# Patient Record
Sex: MALE | Race: WHITE | NOT HISPANIC OR LATINO | Employment: OTHER | URBAN - METROPOLITAN AREA
[De-identification: names, ages, dates, MRNs, and addresses within clinical notes are randomized per-mention and may not be internally consistent; named-entity substitution may affect disease eponyms.]

---

## 2017-09-07 ENCOUNTER — HOSPITAL ENCOUNTER (OUTPATIENT)
Dept: RADIOLOGY | Facility: MEDICAL CENTER | Age: 82
End: 2017-09-07

## 2017-09-07 ENCOUNTER — HOSPITAL ENCOUNTER (INPATIENT)
Facility: MEDICAL CENTER | Age: 82
LOS: 7 days | DRG: 853 | End: 2017-09-14
Attending: EMERGENCY MEDICINE | Admitting: HOSPITALIST
Payer: MEDICARE

## 2017-09-07 ENCOUNTER — RESOLUTE PROFESSIONAL BILLING HOSPITAL PROF FEE (OUTPATIENT)
Dept: HOSPITALIST | Facility: MEDICAL CENTER | Age: 82
End: 2017-09-07
Payer: MEDICARE

## 2017-09-07 DIAGNOSIS — Z79.01 CHRONIC ANTICOAGULATION: ICD-10-CM

## 2017-09-07 DIAGNOSIS — A41.9 SEPSIS, DUE TO UNSPECIFIED ORGANISM: ICD-10-CM

## 2017-09-07 DIAGNOSIS — K83.1 BILIARY OBSTRUCTION: ICD-10-CM

## 2017-09-07 DIAGNOSIS — R55 SYNCOPE, UNSPECIFIED SYNCOPE TYPE: ICD-10-CM

## 2017-09-07 PROBLEM — K80.50 CHOLEDOCHOLITHIASIS: Status: ACTIVE | Noted: 2017-09-07

## 2017-09-07 PROBLEM — K27.9 PUD (PEPTIC ULCER DISEASE): Status: ACTIVE | Noted: 2017-09-07

## 2017-09-07 PROBLEM — E87.20 METABOLIC ACIDEMIA: Status: ACTIVE | Noted: 2017-09-07

## 2017-09-07 PROBLEM — D68.32 HEMORRHAGIC DISORDER DUE TO EXTRINSIC CIRCULATING ANTICOAGULANTS (HCC): Status: ACTIVE | Noted: 2017-09-07

## 2017-09-07 PROBLEM — K83.09 CHOLANGITIS: Status: ACTIVE | Noted: 2017-09-07

## 2017-09-07 LAB
ALBUMIN SERPL BCP-MCNC: 3.8 G/DL (ref 3.2–4.9)
ALBUMIN/GLOB SERPL: 1.2 G/DL
ALP SERPL-CCNC: 199 U/L (ref 30–99)
ALT SERPL-CCNC: 497 U/L (ref 2–50)
ANION GAP SERPL CALC-SCNC: 11 MMOL/L (ref 0–11.9)
APPEARANCE UR: CLEAR
AST SERPL-CCNC: 946 U/L (ref 12–45)
BACTERIA #/AREA URNS HPF: NEGATIVE /HPF
BASOPHILS # BLD AUTO: 0.3 % (ref 0–1.8)
BASOPHILS # BLD: 0.06 K/UL (ref 0–0.12)
BILIRUB SERPL-MCNC: 3 MG/DL (ref 0.1–1.5)
BILIRUB UR QL STRIP.AUTO: NEGATIVE
BNP SERPL-MCNC: 203 PG/ML (ref 0–100)
BUN SERPL-MCNC: 31 MG/DL (ref 8–22)
CALCIUM SERPL-MCNC: 8.6 MG/DL (ref 8.5–10.5)
CFT BLD TEG: 3.4 MIN (ref 5–10)
CHLORIDE SERPL-SCNC: 107 MMOL/L (ref 96–112)
CLOT ANGLE BLD TEG: 64.4 DEGREES (ref 53–72)
CLOT LYSIS 30M P MA LENFR BLD TEG: 0.4 % (ref 0–8)
CO2 SERPL-SCNC: 18 MMOL/L (ref 20–33)
COLOR UR: YELLOW
CREAT SERPL-MCNC: 1.44 MG/DL (ref 0.5–1.4)
CT.EXTRINSIC BLD ROTEM: 0.9 MIN (ref 1–3)
CULTURE IF INDICATED INDCX: NO UA CULTURE
EOSINOPHIL # BLD AUTO: 0 K/UL (ref 0–0.51)
EOSINOPHIL NFR BLD: 0 % (ref 0–6.9)
EPI CELLS #/AREA URNS HPF: NEGATIVE /HPF
ERYTHROCYTE [DISTWIDTH] IN BLOOD BY AUTOMATED COUNT: 49 FL (ref 35.9–50)
GFR SERPL CREATININE-BSD FRML MDRD: 47 ML/MIN/1.73 M 2
GLOBULIN SER CALC-MCNC: 3.3 G/DL (ref 1.9–3.5)
GLUCOSE BLD-MCNC: 285 MG/DL (ref 65–99)
GLUCOSE SERPL-MCNC: 328 MG/DL (ref 65–99)
GLUCOSE UR STRIP.AUTO-MCNC: >=1000 MG/DL
HCT VFR BLD AUTO: 34 % (ref 42–52)
HGB BLD-MCNC: 11.1 G/DL (ref 14–18)
HYALINE CASTS #/AREA URNS LPF: ABNORMAL /LPF
IMM GRANULOCYTES # BLD AUTO: 0.07 K/UL (ref 0–0.11)
IMM GRANULOCYTES NFR BLD AUTO: 0.4 % (ref 0–0.9)
KETONES UR STRIP.AUTO-MCNC: 15 MG/DL
LACTATE BLD-SCNC: 1.9 MMOL/L (ref 0.5–2)
LACTATE BLD-SCNC: 2.9 MMOL/L (ref 0.5–2)
LEUKOCYTE ESTERASE UR QL STRIP.AUTO: NEGATIVE
LYMPHOCYTES # BLD AUTO: 0.53 K/UL (ref 1–4.8)
LYMPHOCYTES NFR BLD: 2.8 % (ref 22–41)
MCF BLD TEG: 70.4 MM (ref 50–70)
MCH RBC QN AUTO: 32 PG (ref 27–33)
MCHC RBC AUTO-ENTMCNC: 32.6 G/DL (ref 33.7–35.3)
MCV RBC AUTO: 98 FL (ref 81.4–97.8)
MICRO URNS: ABNORMAL
MONOCYTES # BLD AUTO: 1.2 K/UL (ref 0–0.85)
MONOCYTES NFR BLD AUTO: 6.3 % (ref 0–13.4)
NEUTROPHILS # BLD AUTO: 17.24 K/UL (ref 1.82–7.42)
NEUTROPHILS NFR BLD: 90.2 % (ref 44–72)
NITRITE UR QL STRIP.AUTO: NEGATIVE
NRBC # BLD AUTO: 0 K/UL
NRBC BLD AUTO-RTO: 0 /100 WBC
PA AA BLD-ACNC: 13.3 %
PA ADP BLD-ACNC: 8.1 %
PH UR STRIP.AUTO: 5.5 [PH]
PLATELET # BLD AUTO: 155 K/UL (ref 164–446)
PMV BLD AUTO: 13.3 FL (ref 9–12.9)
POTASSIUM SERPL-SCNC: 4.2 MMOL/L (ref 3.6–5.5)
PROT SERPL-MCNC: 7.1 G/DL (ref 6–8.2)
PROT UR QL STRIP: 30 MG/DL
RBC # BLD AUTO: 3.47 M/UL (ref 4.7–6.1)
RBC # URNS HPF: ABNORMAL /HPF
RBC UR QL AUTO: NEGATIVE
SODIUM SERPL-SCNC: 136 MMOL/L (ref 135–145)
SP GR UR STRIP.AUTO: 1.02
TEG ALGORITHM TGALG: ABNORMAL
TROPONIN I SERPL-MCNC: <0.01 NG/ML (ref 0–0.04)
UROBILINOGEN UR STRIP.AUTO-MCNC: 1 MG/DL
WBC # BLD AUTO: 19.1 K/UL (ref 4.8–10.8)
WBC #/AREA URNS HPF: ABNORMAL /HPF

## 2017-09-07 PROCEDURE — 94760 N-INVAS EAR/PLS OXIMETRY 1: CPT

## 2017-09-07 PROCEDURE — 85384 FIBRINOGEN ACTIVITY: CPT

## 2017-09-07 PROCEDURE — 93005 ELECTROCARDIOGRAM TRACING: CPT | Performed by: EMERGENCY MEDICINE

## 2017-09-07 PROCEDURE — 99221 1ST HOSP IP/OBS SF/LOW 40: CPT | Mod: AI | Performed by: HOSPITALIST

## 2017-09-07 PROCEDURE — 85025 COMPLETE CBC W/AUTO DIFF WBC: CPT | Mod: 91

## 2017-09-07 PROCEDURE — 96361 HYDRATE IV INFUSION ADD-ON: CPT

## 2017-09-07 PROCEDURE — 36415 COLL VENOUS BLD VENIPUNCTURE: CPT

## 2017-09-07 PROCEDURE — 85347 COAGULATION TIME ACTIVATED: CPT

## 2017-09-07 PROCEDURE — 96365 THER/PROPH/DIAG IV INF INIT: CPT

## 2017-09-07 PROCEDURE — 700105 HCHG RX REV CODE 258: Performed by: EMERGENCY MEDICINE

## 2017-09-07 PROCEDURE — 700111 HCHG RX REV CODE 636 W/ 250 OVERRIDE (IP): Performed by: EMERGENCY MEDICINE

## 2017-09-07 PROCEDURE — 770022 HCHG ROOM/CARE - ICU (200)

## 2017-09-07 PROCEDURE — 700105 HCHG RX REV CODE 258: Performed by: HOSPITALIST

## 2017-09-07 PROCEDURE — 82962 GLUCOSE BLOOD TEST: CPT

## 2017-09-07 PROCEDURE — 83605 ASSAY OF LACTIC ACID: CPT | Mod: 91

## 2017-09-07 PROCEDURE — 700102 HCHG RX REV CODE 250 W/ 637 OVERRIDE(OP): Performed by: HOSPITALIST

## 2017-09-07 PROCEDURE — 83880 ASSAY OF NATRIURETIC PEPTIDE: CPT

## 2017-09-07 PROCEDURE — 81001 URINALYSIS AUTO W/SCOPE: CPT

## 2017-09-07 PROCEDURE — 96372 THER/PROPH/DIAG INJ SC/IM: CPT

## 2017-09-07 PROCEDURE — 99291 CRITICAL CARE FIRST HOUR: CPT

## 2017-09-07 PROCEDURE — 80053 COMPREHEN METABOLIC PANEL: CPT | Mod: 91

## 2017-09-07 PROCEDURE — 84484 ASSAY OF TROPONIN QUANT: CPT | Mod: 91

## 2017-09-07 PROCEDURE — 85576 BLOOD PLATELET AGGREGATION: CPT | Mod: 91

## 2017-09-07 RX ORDER — SODIUM CHLORIDE 9 MG/ML
INJECTION, SOLUTION INTRAVENOUS CONTINUOUS
Status: DISCONTINUED | OUTPATIENT
Start: 2017-09-07 | End: 2017-09-14 | Stop reason: HOSPADM

## 2017-09-07 RX ORDER — BISACODYL 10 MG
10 SUPPOSITORY, RECTAL RECTAL
Status: DISCONTINUED | OUTPATIENT
Start: 2017-09-07 | End: 2017-09-14 | Stop reason: HOSPADM

## 2017-09-07 RX ORDER — SODIUM CHLORIDE 9 MG/ML
1000 INJECTION, SOLUTION INTRAVENOUS ONCE
Status: COMPLETED | OUTPATIENT
Start: 2017-09-07 | End: 2017-09-07

## 2017-09-07 RX ORDER — AMOXICILLIN 250 MG
2 CAPSULE ORAL 2 TIMES DAILY
Status: DISCONTINUED | OUTPATIENT
Start: 2017-09-07 | End: 2017-09-14 | Stop reason: HOSPADM

## 2017-09-07 RX ORDER — LEVOTHYROXINE SODIUM 0.12 MG/1
125 TABLET ORAL
Status: DISCONTINUED | OUTPATIENT
Start: 2017-09-08 | End: 2017-09-14 | Stop reason: HOSPADM

## 2017-09-07 RX ORDER — POLYETHYLENE GLYCOL 3350 17 G/17G
1 POWDER, FOR SOLUTION ORAL
Status: DISCONTINUED | OUTPATIENT
Start: 2017-09-07 | End: 2017-09-14 | Stop reason: HOSPADM

## 2017-09-07 RX ORDER — SODIUM CHLORIDE 9 MG/ML
500 INJECTION, SOLUTION INTRAVENOUS
Status: COMPLETED | OUTPATIENT
Start: 2017-09-07 | End: 2017-09-08

## 2017-09-07 RX ORDER — ACETAMINOPHEN 325 MG/1
650 TABLET ORAL EVERY 6 HOURS PRN
Status: DISCONTINUED | OUTPATIENT
Start: 2017-09-07 | End: 2017-09-13

## 2017-09-07 RX ORDER — DEXTROSE MONOHYDRATE 25 G/50ML
25 INJECTION, SOLUTION INTRAVENOUS
Status: DISCONTINUED | OUTPATIENT
Start: 2017-09-07 | End: 2017-09-14 | Stop reason: HOSPADM

## 2017-09-07 RX ORDER — ONDANSETRON 4 MG/1
4 TABLET, ORALLY DISINTEGRATING ORAL EVERY 4 HOURS PRN
Status: DISCONTINUED | OUTPATIENT
Start: 2017-09-07 | End: 2017-09-14 | Stop reason: HOSPADM

## 2017-09-07 RX ORDER — ONDANSETRON 2 MG/ML
4 INJECTION INTRAMUSCULAR; INTRAVENOUS EVERY 4 HOURS PRN
Status: DISCONTINUED | OUTPATIENT
Start: 2017-09-07 | End: 2017-09-14 | Stop reason: HOSPADM

## 2017-09-07 RX ADMIN — SODIUM CHLORIDE: 9 INJECTION, SOLUTION INTRAVENOUS at 20:16

## 2017-09-07 RX ADMIN — INSULIN LISPRO 7 UNITS: 100 INJECTION, SOLUTION INTRAVENOUS; SUBCUTANEOUS at 20:20

## 2017-09-07 RX ADMIN — SODIUM CHLORIDE: 9 INJECTION, SOLUTION INTRAVENOUS at 22:13

## 2017-09-07 RX ADMIN — PIPERACILLIN SODIUM AND TAZOBACTAM SODIUM 4.5 G: 4; .5 INJECTION, POWDER, FOR SOLUTION INTRAVENOUS at 17:35

## 2017-09-07 RX ADMIN — SODIUM CHLORIDE 1000 ML: 9 INJECTION, SOLUTION INTRAVENOUS at 17:14

## 2017-09-07 ASSESSMENT — ENCOUNTER SYMPTOMS
FALLS: 1
TINGLING: 0
DIZZINESS: 0
LOSS OF CONSCIOUSNESS: 1
BLOOD IN STOOL: 0
INSOMNIA: 0
ABDOMINAL PAIN: 0
PALPITATIONS: 0
DEPRESSION: 0
HEADACHES: 0
BACK PAIN: 0
MEMORY LOSS: 1
NAUSEA: 0
FEVER: 0
DIARRHEA: 0
EYE PAIN: 1
EYE PAIN: 0
BLURRED VISION: 0
ABDOMINAL PAIN: 1
SORE THROAT: 0
CHILLS: 0
SHORTNESS OF BREATH: 0
COUGH: 0
NECK PAIN: 0
VOMITING: 0
WEAKNESS: 0

## 2017-09-07 ASSESSMENT — LIFESTYLE VARIABLES: EVER_SMOKED: NEVER

## 2017-09-07 ASSESSMENT — COPD QUESTIONNAIRES
COPD SCREENING SCORE: 2
DO YOU EVER COUGH UP ANY MUCUS OR PHLEGM?: NO/ONLY WITH OCCASIONAL COLDS OR INFECTIONS
DURING THE PAST 4 WEEKS HOW MUCH DID YOU FEEL SHORT OF BREATH: NONE/LITTLE OF THE TIME
HAVE YOU SMOKED AT LEAST 100 CIGARETTES IN YOUR ENTIRE LIFE: NO/DON'T KNOW

## 2017-09-07 ASSESSMENT — PAIN SCALES - GENERAL: PAINLEVEL_OUTOF10: 0

## 2017-09-07 NOTE — ED NOTES
Pt this AM woke up with some nausea and LLQ pain. Got up to use the bathroom and had a unwitnessed syncopal event. Slight pain to head and left knee. AOx4. GCS 15. GRADY. Sensation intact. Pt had scans done pta. 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 Transferred for further care.   /60   Pulse 72   Temp 37 °C (98.6 °F)   Resp 16   Ht 1.829 m (6')   Wt 70.8 kg (156 lb)   SpO2 91%   BMI 21.16 kg/m²   In gown, on monitor, EKG completed. Chart up for ERP.

## 2017-09-07 NOTE — ED PROVIDER NOTES
ED Provider Note    Scribed for Ruiz Salazar M.D. by Robinson Nichols. 9/7/2017, 4:35 PM.    Primary care provider: Myron Haines M.D.  Means of arrival: [facility]  History obtained from: Patient  History limited by: None    CHIEF COMPLAINT  Chief Complaint   Patient presents with   • Syncope     In bathroom this AM had syncopal event unwitnessed. Slid down wall.    • LLQ Pain     started this AM. Got up to go to the bathroom prior to sycopal event     HPI  Naveed Tamayo is a 83 y.o. Male with a history of cancer, diabetes, and peptic ulcer disease who presents to the Emergency Department complaining of acute onset syncope, onset this morning while at home. This was an unwitnessed event and the patient does not recall the event. He remembers walking into the bathroom and waking up in the bathroom back against the wall. The patient notes that he is on blood thinners at this time for DVT 2 months ago. Patient denies associated head trauma, neck pain, chest pain, back pain, nausea, vomiting, fever, chills, weakness, decrease in PO intake, hematochezia, or melena. He endorses a slight amount of left lower quadrant abdominal pain, right knee pain, and right arm pain at this time.     REVIEW OF SYSTEMS  Review of Systems   Constitutional: Negative for chills, fever and malaise/fatigue.   Eyes: Positive for pain.   Cardiovascular: Negative for chest pain.   Gastrointestinal: Positive for abdominal pain. Negative for blood in stool, diarrhea, melena, nausea and vomiting.        No decrease in PO intake.   Musculoskeletal: Positive for falls. Negative for back pain and neck pain.        Positive leg pain and arm pain   Neurological: Positive for loss of consciousness. Negative for weakness and headaches.        Positive syncope   Psychiatric/Behavioral: Positive for memory loss.   All other systems reviewed and are negative.  C.    PAST MEDICAL HISTORY   has a past medical history of Cancer (CMS-HCC); Diabetes;  Heart murmur; and History of peptic ulcer (1960).    SURGICAL HISTORY   has a past surgical history that includes thyroidectomy; prostatectomy, radical retro; abdominal exploration; and cholecystectomy.    SOCIAL HISTORY  Social History   Substance Use Topics   • Smoking status: Never Smoker   • Smokeless tobacco: Never Used   • Alcohol use No      History   Drug Use No     FAMILY HISTORY  No pertinent family history    CURRENT MEDICATIONS  No current facility-administered medications on file prior to encounter.      Current Outpatient Prescriptions on File Prior to Encounter   Medication Sig Dispense Refill   • Rivaroxaban (XARELTO PO) Take  by mouth.     • cyclobenzaprine (FLEXERIL) 10 MG Tab Take 1 Tab by mouth 3 times a day as needed for Muscle Spasms. 45 Tab 0   • Cholecalciferol (VITAMIN D PO) Take  by mouth every day.     • insulin detemir (LEVEMIR FLEXPEN) 100 UNIT/ML SOPN injection Inject  as instructed every evening.     • levothyroxine (SYNTHROID) 125 MCG TABS Take 1 Tab by mouth Every morning on an empty stomach. 30 Tab 0     ALLERGIES  Allergies   Allergen Reactions   • Talwin      PHYSICAL EXAM  VITAL SIGNS: /60   Pulse 72   Temp 37 °C (98.6 °F)   Resp 16   Ht 1.829 m (6')   Wt 70.8 kg (156 lb)   SpO2 91%   BMI 21.16 kg/m²   Vitals reviewed.  Constitutional:Awake, alert, chronically ill-appearing, no acute distress.   HENT: Normocephalic, Atraumatic, Bilateral external ears normal, dry mucous membranes, No oral exudates, Nose normal.   Eyes: PERRL, EOMI, Conjunctiva normal, No discharge.   Neck: Normal range of motion, No tenderness, Supple, No stridor.   Cardiovascular: Normal heart rate, Normal rhythm, No murmurs, No rubs, No gallops.   Thorax & Lungs: Normal breath sounds, No respiratory distress, No wheezing, No chest tenderness.   Abdomen: Bowel sounds normal, Soft, No tenderness  Skin: Warm, Dry, No erythema, No rash.   Back: No tenderness, No CVA tenderness.   Musculoskeletal: Good  range of motion in all major joints. No edema. No tenderness to palpation or major deformities noted.  Slight tenderness of left knee no gross instability.  Neurologic: Alert, Normal motor function, Normal sensory function, No focal deficits noted.   Psychiatric: Affect normal    LABS  Results for orders placed or performed during the hospital encounter of 17   EKG (ER)   Result Value Ref Range    Report       [facility] Emergency Dept.    Test Date:  2017  Pt Name:    ANGELA HARVEY             Department: ER  MRN:        [id number]                      Room:        11  Gender:     M                            Technician: [id number]  :        [id number]                   Requested By:LANRE EDDY  Order #:    [id number]                    Reading MD:    Measurements  Intervals                                Axis  Rate:       74                           P:          37  IN:         180                          QRS:        -8  QRSD:       78                           T:          34  QT:         416  QTc:        462    Interpretive Statements  SINUS RHYTHM  EARLY PRECORDIAL R/S TRANSITION  No previous ECG available for comparison       All labs reviewed by me.    EKG Interpretation  Interpreted by me    Rhythm:  Normal sinus rhythm   Rate: 74  Axis: normal  Ectopy: none  Conduction: normal  ST Segments: no acute change  T Waves: no acute change  Q Waves: none  Clinical Impression: Normal EKG without acute changes      COURSE & MEDICAL DECISION MAKING  Pertinent Labs & Imaging studies reviewed. (See chart for details)    4:35 PM Patient seen and examined at bedside. The patient presents with Transferred with a syncopal episode, elevated LFTs and concern for sepsis and biliary obstruction.  Also, concern for portal vein thrombosis., and the differential diagnosis includes but is not limited to *cardiac syncope, bradycardia, cholangitis, biliary obstruction, arrhythmia to name a few  Ordered for CBC, CMP, lactic acid, urinalysis, BNP, Troponin, and EKG to evaluate. Patient will be treated with IV fluids for evidence of dehydration.     Vision and extensive workup at [facility].  3.  Head CT, an abdominal CT of back, CT, which were all essentially negative except for some dilated biliary ducts.  MRCP showed a biliary stone.  MRI of the back was negative.  Labs were obtained.  Results reviewed.  The patient is chronically on anticoagulation.  Because of his underlying age and comorbidities, I repeated these labs.  His white count is trending up.  His LFTs are worsening.  I think this is likely biliary obstruction.  Nose did not reveal that he has received antibiotics.  He started on IV Zosyn.  He is given fluids prior to arrival.  She was in additional fluids here.    He'll be admitted to the hospitalist.  I spoke with Dr. Cohn.  He will speak with GI.     4:48 PMI discussed the case with Dr. Cohn Hospitalist. He is aware of the patient and agrees to admit the patient into his care.       DISPOSITION:  Patient will be admitted to Dr. Cohn Hospitalist in guarded  condition.    FINAL IMPRESSION  1. Syncope, unspecified syncope type    2. Sepsis, due to unspecified organism (CMS-HCC)    3. Biliary obstruction    4. Chronic anticoagulation          Robinson MOON (Scribe), am scribing for, and in the presence of, Ruiz Salazar M.D..    Electronically signed by: Robinson Nichols (Milinde), 9/7/2017    Ruiz MOON M.D. personally performed the services described in this documentation, as scribed by Robinson Nichols in my presence, and it is both accurate and complete.    The note accurately reflects work and decisions made by me.  Ruiz Salazar  9/7/2017  6:50 PM

## 2017-09-08 ENCOUNTER — APPOINTMENT (OUTPATIENT)
Dept: RADIOLOGY | Facility: MEDICAL CENTER | Age: 82
DRG: 853 | End: 2017-09-08
Attending: INTERNAL MEDICINE
Payer: MEDICARE

## 2017-09-08 ENCOUNTER — APPOINTMENT (OUTPATIENT)
Dept: RADIOLOGY | Facility: MEDICAL CENTER | Age: 82
DRG: 853 | End: 2017-09-08
Attending: HOSPITALIST
Payer: MEDICARE

## 2017-09-08 PROBLEM — J96.00 ACUTE RESPIRATORY FAILURE (HCC): Status: ACTIVE | Noted: 2017-09-08

## 2017-09-08 PROBLEM — R41.82 ALTERED MENTAL STATUS: Status: ACTIVE | Noted: 2017-09-08

## 2017-09-08 LAB
ALBUMIN SERPL BCP-MCNC: 3.1 G/DL (ref 3.2–4.9)
ALBUMIN/GLOB SERPL: 1 G/DL
ALP SERPL-CCNC: 164 U/L (ref 30–99)
ALT SERPL-CCNC: 372 U/L (ref 2–50)
ANION GAP SERPL CALC-SCNC: 10 MMOL/L (ref 0–11.9)
ANISOCYTOSIS BLD QL SMEAR: ABNORMAL
AST SERPL-CCNC: 489 U/L (ref 12–45)
BASOPHILS # BLD AUTO: 0 % (ref 0–1.8)
BASOPHILS # BLD AUTO: 0.4 % (ref 0–1.8)
BASOPHILS # BLD: 0 K/UL (ref 0–0.12)
BASOPHILS # BLD: 0.06 K/UL (ref 0–0.12)
BILIRUB SERPL-MCNC: 3.5 MG/DL (ref 0.1–1.5)
BUN SERPL-MCNC: 28 MG/DL (ref 8–22)
BURR CELLS BLD QL SMEAR: NORMAL
CALCIUM SERPL-MCNC: 7.6 MG/DL (ref 8.5–10.5)
CHLORIDE SERPL-SCNC: 107 MMOL/L (ref 96–112)
CO2 SERPL-SCNC: 17 MMOL/L (ref 20–33)
CREAT SERPL-MCNC: 1.4 MG/DL (ref 0.5–1.4)
EOSINOPHIL # BLD AUTO: 0 K/UL (ref 0–0.51)
EOSINOPHIL # BLD AUTO: 0.01 K/UL (ref 0–0.51)
EOSINOPHIL NFR BLD: 0 % (ref 0–6.9)
EOSINOPHIL NFR BLD: 0.1 % (ref 0–6.9)
ERYTHROCYTE [DISTWIDTH] IN BLOOD BY AUTOMATED COUNT: 49.8 FL (ref 35.9–50)
ERYTHROCYTE [DISTWIDTH] IN BLOOD BY AUTOMATED COUNT: 52.4 FL (ref 35.9–50)
GFR SERPL CREATININE-BSD FRML MDRD: 48 ML/MIN/1.73 M 2
GLOBULIN SER CALC-MCNC: 3 G/DL (ref 1.9–3.5)
GLUCOSE BLD-MCNC: 145 MG/DL (ref 65–99)
GLUCOSE BLD-MCNC: 220 MG/DL (ref 65–99)
GLUCOSE BLD-MCNC: 225 MG/DL (ref 65–99)
GLUCOSE BLD-MCNC: 225 MG/DL (ref 65–99)
GLUCOSE SERPL-MCNC: 190 MG/DL (ref 65–99)
HCT VFR BLD AUTO: 31.4 % (ref 42–52)
HCT VFR BLD AUTO: 33.3 % (ref 42–52)
HGB BLD-MCNC: 10.2 G/DL (ref 14–18)
HGB BLD-MCNC: 10.8 G/DL (ref 14–18)
IMM GRANULOCYTES # BLD AUTO: 0.03 K/UL (ref 0–0.11)
IMM GRANULOCYTES NFR BLD AUTO: 0.2 % (ref 0–0.9)
LACTATE BLD-SCNC: 2.3 MMOL/L (ref 0.5–2)
LACTATE BLD-SCNC: 3.1 MMOL/L (ref 0.5–2)
LACTATE BLD-SCNC: 3.4 MMOL/L (ref 0.5–2)
LYMPHOCYTES # BLD AUTO: 0.2 K/UL (ref 1–4.8)
LYMPHOCYTES # BLD AUTO: 1.05 K/UL (ref 1–4.8)
LYMPHOCYTES NFR BLD: 0.9 % (ref 22–41)
LYMPHOCYTES NFR BLD: 6.5 % (ref 22–41)
MACROCYTES BLD QL SMEAR: ABNORMAL
MANUAL DIFF BLD: NORMAL
MCH RBC QN AUTO: 31.8 PG (ref 27–33)
MCH RBC QN AUTO: 31.9 PG (ref 27–33)
MCHC RBC AUTO-ENTMCNC: 32.4 G/DL (ref 33.7–35.3)
MCHC RBC AUTO-ENTMCNC: 32.5 G/DL (ref 33.7–35.3)
MCV RBC AUTO: 97.8 FL (ref 81.4–97.8)
MCV RBC AUTO: 98.2 FL (ref 81.4–97.8)
MONOCYTES # BLD AUTO: 1.08 K/UL (ref 0–0.85)
MONOCYTES # BLD AUTO: 1.14 K/UL (ref 0–0.85)
MONOCYTES NFR BLD AUTO: 5.2 % (ref 0–13.4)
MONOCYTES NFR BLD AUTO: 6.7 % (ref 0–13.4)
MORPHOLOGY BLD-IMP: NORMAL
NEUTROPHILS # BLD AUTO: 13.91 K/UL (ref 1.82–7.42)
NEUTROPHILS # BLD AUTO: 20.66 K/UL (ref 1.82–7.42)
NEUTROPHILS NFR BLD: 86.1 % (ref 44–72)
NEUTROPHILS NFR BLD: 89.5 % (ref 44–72)
NEUTS BAND NFR BLD MANUAL: 4.4 % (ref 0–10)
NRBC # BLD AUTO: 0 K/UL
NRBC # BLD AUTO: 0 K/UL
NRBC BLD AUTO-RTO: 0 /100 WBC
NRBC BLD AUTO-RTO: 0 /100 WBC
PLATELET # BLD AUTO: 104 K/UL (ref 164–446)
PLATELET # BLD AUTO: 126 K/UL (ref 164–446)
PLATELET BLD QL SMEAR: NORMAL
PMV BLD AUTO: 13.4 FL (ref 9–12.9)
PMV BLD AUTO: 13.8 FL (ref 9–12.9)
POIKILOCYTOSIS BLD QL SMEAR: NORMAL
POTASSIUM SERPL-SCNC: 4.9 MMOL/L (ref 3.6–5.5)
PROT SERPL-MCNC: 6.1 G/DL (ref 6–8.2)
RBC # BLD AUTO: 3.21 M/UL (ref 4.7–6.1)
RBC # BLD AUTO: 3.39 M/UL (ref 4.7–6.1)
RBC BLD AUTO: PRESENT
SCHISTOCYTES BLD QL SMEAR: NORMAL
SODIUM SERPL-SCNC: 134 MMOL/L (ref 135–145)
WBC # BLD AUTO: 16.1 K/UL (ref 4.8–10.8)
WBC # BLD AUTO: 22 K/UL (ref 4.8–10.8)

## 2017-09-08 PROCEDURE — 770022 HCHG ROOM/CARE - ICU (200)

## 2017-09-08 PROCEDURE — 700102 HCHG RX REV CODE 250 W/ 637 OVERRIDE(OP): Performed by: INTERNAL MEDICINE

## 2017-09-08 PROCEDURE — 85025 COMPLETE CBC W/AUTO DIFF WBC: CPT

## 2017-09-08 PROCEDURE — 71010 DX-CHEST-PORTABLE (1 VIEW): CPT

## 2017-09-08 PROCEDURE — 85007 BL SMEAR W/DIFF WBC COUNT: CPT

## 2017-09-08 PROCEDURE — 83605 ASSAY OF LACTIC ACID: CPT

## 2017-09-08 PROCEDURE — 99233 SBSQ HOSP IP/OBS HIGH 50: CPT | Performed by: INTERNAL MEDICINE

## 2017-09-08 PROCEDURE — 0F793DZ DILATION OF COMMON BILE DUCT WITH INTRALUMINAL DEVICE, PERCUTANEOUS APPROACH: ICD-10-PCS | Performed by: RADIOLOGY

## 2017-09-08 PROCEDURE — 51798 US URINE CAPACITY MEASURE: CPT

## 2017-09-08 PROCEDURE — 700117 HCHG RX CONTRAST REV CODE 255: Performed by: RADIOLOGY

## 2017-09-08 PROCEDURE — 80053 COMPREHEN METABOLIC PANEL: CPT

## 2017-09-08 PROCEDURE — 700102 HCHG RX REV CODE 250 W/ 637 OVERRIDE(OP): Performed by: HOSPITALIST

## 2017-09-08 PROCEDURE — 700111 HCHG RX REV CODE 636 W/ 250 OVERRIDE (IP)

## 2017-09-08 PROCEDURE — 700111 HCHG RX REV CODE 636 W/ 250 OVERRIDE (IP): Performed by: HOSPITALIST

## 2017-09-08 PROCEDURE — 0F9930Z DRAINAGE OF COMMON BILE DUCT WITH DRAINAGE DEVICE, PERCUTANEOUS APPROACH: ICD-10-PCS | Performed by: RADIOLOGY

## 2017-09-08 PROCEDURE — 85027 COMPLETE CBC AUTOMATED: CPT

## 2017-09-08 PROCEDURE — 700105 HCHG RX REV CODE 258: Performed by: HOSPITALIST

## 2017-09-08 PROCEDURE — 99152 MOD SED SAME PHYS/QHP 5/>YRS: CPT

## 2017-09-08 PROCEDURE — A9270 NON-COVERED ITEM OR SERVICE: HCPCS | Performed by: INTERNAL MEDICINE

## 2017-09-08 PROCEDURE — 94640 AIRWAY INHALATION TREATMENT: CPT

## 2017-09-08 PROCEDURE — BF101ZZ FLUOROSCOPY OF BILE DUCTS USING LOW OSMOLAR CONTRAST: ICD-10-PCS | Performed by: RADIOLOGY

## 2017-09-08 PROCEDURE — A9270 NON-COVERED ITEM OR SERVICE: HCPCS | Performed by: HOSPITALIST

## 2017-09-08 PROCEDURE — 82962 GLUCOSE BLOOD TEST: CPT

## 2017-09-08 RX ORDER — ONDANSETRON 2 MG/ML
4 INJECTION INTRAMUSCULAR; INTRAVENOUS PRN
Status: ACTIVE | OUTPATIENT
Start: 2017-09-08 | End: 2017-09-08

## 2017-09-08 RX ORDER — SODIUM CHLORIDE 9 MG/ML
500 INJECTION, SOLUTION INTRAVENOUS
Status: ACTIVE | OUTPATIENT
Start: 2017-09-08 | End: 2017-09-08

## 2017-09-08 RX ORDER — CEFAZOLIN SODIUM 1 G/3ML
INJECTION, POWDER, FOR SOLUTION INTRAMUSCULAR; INTRAVENOUS
Status: COMPLETED
Start: 2017-09-08 | End: 2017-09-08

## 2017-09-08 RX ORDER — NALOXONE HYDROCHLORIDE 0.4 MG/ML
INJECTION, SOLUTION INTRAMUSCULAR; INTRAVENOUS; SUBCUTANEOUS
Status: COMPLETED
Start: 2017-09-08 | End: 2017-09-08

## 2017-09-08 RX ORDER — MIDAZOLAM HYDROCHLORIDE 1 MG/ML
.5-2 INJECTION INTRAMUSCULAR; INTRAVENOUS PRN
Status: ACTIVE | OUTPATIENT
Start: 2017-09-08 | End: 2017-09-08

## 2017-09-08 RX ORDER — MIDAZOLAM HYDROCHLORIDE 1 MG/ML
INJECTION INTRAMUSCULAR; INTRAVENOUS
Status: COMPLETED
Start: 2017-09-08 | End: 2017-09-08

## 2017-09-08 RX ORDER — HYDROCODONE BITARTRATE AND ACETAMINOPHEN 5; 325 MG/1; MG/1
1 TABLET ORAL ONCE
Status: COMPLETED | OUTPATIENT
Start: 2017-09-08 | End: 2017-09-08

## 2017-09-08 RX ORDER — NALOXONE HYDROCHLORIDE 0.4 MG/ML
0.4 INJECTION, SOLUTION INTRAMUSCULAR; INTRAVENOUS; SUBCUTANEOUS ONCE
Status: COMPLETED | OUTPATIENT
Start: 2017-09-08 | End: 2017-09-08

## 2017-09-08 RX ADMIN — PIPERACILLIN SODIUM AND TAZOBACTAM SODIUM 4.5 G: 4; .5 INJECTION, POWDER, FOR SOLUTION INTRAVENOUS at 17:01

## 2017-09-08 RX ADMIN — SODIUM CHLORIDE: 9 INJECTION, SOLUTION INTRAVENOUS at 02:44

## 2017-09-08 RX ADMIN — SODIUM CHLORIDE: 9 INJECTION, SOLUTION INTRAVENOUS at 20:42

## 2017-09-08 RX ADMIN — LEVOTHYROXINE SODIUM 125 MCG: 125 TABLET ORAL at 05:54

## 2017-09-08 RX ADMIN — INSULIN LISPRO 4 UNITS: 100 INJECTION, SOLUTION INTRAVENOUS; SUBCUTANEOUS at 06:00

## 2017-09-08 RX ADMIN — SODIUM CHLORIDE: 9 INJECTION, SOLUTION INTRAVENOUS at 11:53

## 2017-09-08 RX ADMIN — NALOXONE HYDROCHLORIDE 0.4 MG: 0.4 INJECTION, SOLUTION INTRAMUSCULAR; INTRAVENOUS; SUBCUTANEOUS at 09:50

## 2017-09-08 RX ADMIN — ONDANSETRON 4 MG: 2 INJECTION INTRAMUSCULAR; INTRAVENOUS at 02:44

## 2017-09-08 RX ADMIN — ONDANSETRON 4 MG: 2 INJECTION INTRAMUSCULAR; INTRAVENOUS at 07:24

## 2017-09-08 RX ADMIN — CEFAZOLIN 1000 MG: 1 INJECTION, POWDER, FOR SOLUTION INTRAVENOUS at 08:53

## 2017-09-08 RX ADMIN — PIPERACILLIN SODIUM AND TAZOBACTAM SODIUM 4.5 G: 4; .5 INJECTION, POWDER, FOR SOLUTION INTRAVENOUS at 00:09

## 2017-09-08 RX ADMIN — HYDROCODONE BITARTRATE AND ACETAMINOPHEN 1 TABLET: 5; 325 TABLET ORAL at 05:54

## 2017-09-08 RX ADMIN — INSULIN LISPRO 4 UNITS: 100 INJECTION, SOLUTION INTRAVENOUS; SUBCUTANEOUS at 18:37

## 2017-09-08 RX ADMIN — MIDAZOLAM HYDROCHLORIDE 1 MG: 1 INJECTION INTRAMUSCULAR; INTRAVENOUS at 08:50

## 2017-09-08 RX ADMIN — IOHEXOL 20 ML: 300 INJECTION, SOLUTION INTRAVENOUS at 09:00

## 2017-09-08 RX ADMIN — INSULIN LISPRO 4 UNITS: 100 INJECTION, SOLUTION INTRAVENOUS; SUBCUTANEOUS at 00:25

## 2017-09-08 RX ADMIN — PIPERACILLIN SODIUM AND TAZOBACTAM SODIUM 4.5 G: 4; .5 INJECTION, POWDER, FOR SOLUTION INTRAVENOUS at 11:53

## 2017-09-08 RX ADMIN — PIPERACILLIN SODIUM AND TAZOBACTAM SODIUM 4.5 G: 4; .5 INJECTION, POWDER, FOR SOLUTION INTRAVENOUS at 05:54

## 2017-09-08 RX ADMIN — MIDAZOLAM 1 MG: 1 INJECTION INTRAMUSCULAR; INTRAVENOUS at 08:50

## 2017-09-08 RX ADMIN — FENTANYL CITRATE 25 MCG: 50 INJECTION, SOLUTION INTRAMUSCULAR; INTRAVENOUS at 08:50

## 2017-09-08 RX ADMIN — SODIUM CHLORIDE 500 ML: 9 INJECTION, SOLUTION INTRAVENOUS at 05:41

## 2017-09-08 ASSESSMENT — PAIN SCALES - GENERAL
PAINLEVEL_OUTOF10: 4
PAINLEVEL_OUTOF10: 0
PAINLEVEL_OUTOF10: 4
PAINLEVEL_OUTOF10: 2
PAINLEVEL_OUTOF10: 5
PAINLEVEL_OUTOF10: 0
PAINLEVEL_OUTOF10: 2
PAINLEVEL_OUTOF10: 0
PAINLEVEL_OUTOF10: 4
PAINLEVEL_OUTOF10: 0
PAINLEVEL_OUTOF10: 0
PAINLEVEL_OUTOF10: 2
PAINLEVEL_OUTOF10: 2

## 2017-09-08 ASSESSMENT — ENCOUNTER SYMPTOMS
VOMITING: 0
DIARRHEA: 0
FEVER: 0
NAUSEA: 0
CHILLS: 0
SHORTNESS OF BREATH: 1
ABDOMINAL PAIN: 1
CONSTIPATION: 0

## 2017-09-08 NOTE — RESPIRATORY CARE
Called to IR 1 for patient having increased 02 demand. SPO2 at 87% upon arrival. Patient transported to unit and placed on HHFNC. MD notified.

## 2017-09-08 NOTE — OR SURGEON
Immediate Post- Operative Note        PostOp Diagnosis: BILIARY DUCTAL OBSTRUCTION      Procedure(s): PTC AND BILIARY DRAIN    Estimated Blood Loss: Less than 5 ml        Complications: None            9/8/2017     8:46 AM     Jesse Huerta

## 2017-09-08 NOTE — H&P
Hospital Medicine History and Physical    Date of Service  9/7/2017    Chief Complaint  Chief Complaint   Patient presents with   • Syncope     In bathroom this AM had syncopal event unwitnessed. Slid down wall.    • LLQ Pain     started this AM. Got up to go to the bathroom prior to sycopal event       History of Presenting Illness  83 y.o. male who presented 9/7/2017 with A syncopal episode this morning when he was at home. He was seen at an outside hospital and subsequent workup showed evidence for liver failure. Further workup including MRCP revealed evidence for common bile duct stone and the patient was transferred here for a higher level of care. He is to be admitted with evidence of sepsis, cholangitis, and a common bile duct stone.       Primary Care Physician  Myron Haines M.D.    Consultants  Gastroenterology Dr. Sterling  Interventional radiology    Code Status  Do not resuscitate  17 minutes was spent discussing this with the patient tonight                                                                                                     Review of Systems  Review of Systems   Constitutional: Negative for chills and fever.   HENT: Negative for sore throat.    Eyes: Negative for blurred vision and pain.   Respiratory: Negative for cough and shortness of breath.    Cardiovascular: Negative for chest pain and palpitations.   Gastrointestinal: Negative for abdominal pain, nausea and vomiting.   Genitourinary: Negative for dysuria and urgency.   Musculoskeletal: Negative for back pain and neck pain.   Skin: Negative for itching and rash.   Neurological: Negative for dizziness, tingling and headaches.   Psychiatric/Behavioral: Negative for depression. The patient does not have insomnia.    All other systems reviewed and are negative.       Past Medical History  Past Medical History:   Diagnosis Date   • History of peptic ulcer 1960   • Cancer (CMS-HCC)    • Diabetes    • Heart murmur        Surgical  History  Past Surgical History:   Procedure Laterality Date   • ABDOMINAL EXPLORATION     • CHOLECYSTECTOMY     • GASTRECTOMY     • PROSTATECTOMY, RADICAL RETRO     • THYROIDECTOMY         Medications  No current facility-administered medications on file prior to encounter.      Current Outpatient Prescriptions on File Prior to Encounter   Medication Sig Dispense Refill   • Cholecalciferol (VITAMIN D PO) Take  by mouth every day.     • insulin detemir (LEVEMIR FLEXPEN) 100 UNIT/ML SOPN injection Inject 8 Units as instructed every morning.     • levothyroxine (SYNTHROID) 125 MCG TABS Take 1 Tab by mouth Every morning on an empty stomach. 30 Tab 0       Family History  No family history on file.    Social History  Social History   Substance Use Topics   • Smoking status: Never Smoker   • Smokeless tobacco: Never Used   • Alcohol use No       Allergies  Allergies   Allergen Reactions   • Talwin         Physical Exam  Laboratory   Hemodynamics  Temp (24hrs), Av °C (98.6 °F), Min:37 °C (98.6 °F), Max:37 °C (98.6 °F)   Temperature: 37 °C (98.6 °F)  Pulse  Av.8  Min: 66  Max: 76 Heart Rate (Monitored): 70  Blood Pressure : 153/60, NIBP: 123/50      Respiratory      Respiration: 14, Pulse Oximetry: 91 %, O2 Daily Delivery Respiratory : Nasal Cannula        RUL Breath Sounds: Clear, RML Breath Sounds: Clear, RLL Breath Sounds: Diminished, VENKAT Breath Sounds: Clear, LLL Breath Sounds: Diminished    Physical Exam   Constitutional: He is oriented to person, place, and time. He appears well-developed and well-nourished. No distress.   HENT:   Right Ear: External ear normal.   Left Ear: External ear normal.   Nose: Nose normal.   Eyes: Right eye exhibits no discharge. Left eye exhibits no discharge. No scleral icterus.   Neck: No JVD present. No tracheal deviation present.   Cardiovascular: Normal rate, normal heart sounds and intact distal pulses.    No murmur heard.  Pulmonary/Chest: Effort normal and breath sounds  normal. No respiratory distress. He has no wheezes. He has no rales.   Abdominal: Soft. Bowel sounds are normal. He exhibits no distension. There is no tenderness. There is no guarding.   Multiple healed abdominal scars   Musculoskeletal: He exhibits no edema or tenderness.   Neurological: He is alert and oriented to person, place, and time.   Skin: Skin is warm and dry. He is not diaphoretic. No erythema.   Psychiatric: He has a normal mood and affect. His behavior is normal.   Nursing note and vitals reviewed.      Recent Labs      09/07/17   0815  09/07/17   1104  09/07/17   1710   WBC  15.3*  12.7*  19.1*   RBC  3.50*  3.69*  3.47*   HEMOGLOBIN  11.2*  11.8*  11.1*   HEMATOCRIT  35.0*  36.5*  34.0*   MCV  100.0*  98.9*  98.0*   MCH  32.0*  32.0*  32.0   MCHC  32.0*  32.3*  32.6*   RDW  13.4  13.3  49.0   PLATELETCT  126*  108*  155*   MPV  13.1*  12.5*  13.3*     Recent Labs      09/07/17   0815  09/07/17   1710   SODIUM  139  136   POTASSIUM  4.7  4.2   CHLORIDE  107  107   CO2  21  18*   GLUCOSE  285*  328*   BUN  33*  31*   CREATININE  1.6*  1.44*   CALCIUM  8.9  8.6     Recent Labs      09/07/17   0815  09/07/17   1710   ALTSGPT  165*  497*   ASTSGOT  395*  946*   ALKPHOSPHAT  161*  199*   TBILIRUBIN  1.3*  3.0*   LIPASE  62*   --    GLUCOSE  285*  328*     Recent Labs      09/07/17   0815   APTT  17.9*   INR  0.95     Recent Labs      09/07/17   1710   BNPBTYPENAT  203*         Lab Results   Component Value Date    TROPONINI <0.01 09/07/2017     Urinalysis:    Lab Results  Component Value Date/Time   SPECGRAVITY 1.017 09/07/2017 1711   GLUCOSEUR >=1000 (A) 09/07/2017 1711   KETONES 15 (A) 09/07/2017 1711   NITRITE Negative 09/07/2017 1711   WBCURINE 0-2 (A) 09/07/2017 1711   RBCURINE 2-5 (A) 09/07/2017 1711   BACTERIA Negative 09/07/2017 1711   EPITHELCELL Negative 09/07/2017 1711        Imaging  MRCP:   1.  There is moderate intrahepatic and extrahepatic biliary dilatation present. This appears to be due  to a distal common bile duct stone measuring approximately 10 x 5 mm in size and best seen on coronal image 28 from MRCP.  2.  Unusual serpiginous relatively high signal structure at the level of the hepatic hilum. This appears contiguous with intrahepatic vasculature and is probably most suggestive of cavernous transformation from portal venous thrombosis however it is very   difficult to assess the portal vein on this study. Further evaluation with gadolinium-enhanced MRI or contrast-enhanced CT is recommended.   Assessment/Plan     I anticipate this patient will require at least two midnights for appropriate medical management, necessitating inpatient admission.  Critical care time with this patient is 37 minutes. He has a very high risk for worsening sepsis and organ failure related to cholangitis and will be monitored closely in the intensive care unit  * Cholangitis   Assessment & Plan    IV antibiotics, interventional radiology consultation, gastroenterology consultation, and treat sepsis as above        Obstructive jaundice   Assessment & Plan    Stone extraction as per sepsis        Metabolic acidemia   Assessment & Plan    Continue with IV fluids and treat sepsis        Sepsis (CMS-[facility])   Assessment & Plan    This is severe sepsis with the following associated acute organ dysfunction(s): hepatic failure. Sepsis protocol was initiated and the patient is being placed onto IV antibiotics for cholangitis secondary to common bile duct stone. He has a history of a cholecystectomy in the past. Gastroenterology was consultative but unfortunately the patient has a history of a gastrojejunostomy which will prevent ERCP from being done. Interventional radiology is been consultative for a transhepatic cholangioscopy and stone extraction./ Continue IV fluids, IV antibiotics and correct electrolytes and acidosis. Trend lactic acid.        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 Continue PPI therapy        Choledocholithiasis   Assessment & Plan    Stone extraction as above per sepsis            VTE prophylaxis:Holding chemical prophylaxis on admission given impending procedure and coagulopathy xarelto.

## 2017-09-08 NOTE — FLOWSHEET NOTE
This note also relates to the following rows which could not be included:  Respiration - Cannot attach notes to unvalidated device data  Pulse - Cannot attach notes to unvalidated device data  Heart Rate (Monitored) - Cannot attach notes to unvalidated device data  Pulse Oximetry - Cannot attach notes to unvalidated device data       09/08/17 1023   Events/Summary/Plan   Events/Summary/Plan placed on HHFNC   Interdisciplinary Plan of Care-Goals (Indications)   Obstructive Ventilatory Defect or Pulmonary Disease without Obvious Obstruction Physical Exam / Hyperinflation / Wheezing (bronchospasm)   Interdisciplinary Plan of Care-Outcomes    Bronchodilator Outcome Patient at Stable Baseline   Education   Education Yes - Pt. / Family has been Instructed in use of Respiratory Medications and Adverse Reactions   Heated Hi Flow Nasal Cannula   Heated Hi Flow Nasal Cannula (HHFNC) Yes   FiO2 (HHFNC) 100   Flowrate (HHFNC) 60   Humidifier Temperature (HHFNC) 32   Date of Last Circuit Change 09/08/17   Circuit Change Next Due Date (Q 7 Days) 09/15/17   Respiratory WDL   Respiratory (WDL) X   Chest Exam   Work Of Breathing / Effort Moderate;Increased Work of Breathing   Breath Sounds   RUL Breath Sounds Clear   RML Breath Sounds Clear   RLL Breath Sounds Diminished   VENKAT Breath Sounds Clear   LLL Breath Sounds Diminished   Secretions   Sputum Amount Unable to Evaluate   Oximetry   Continuous Oximetry Yes   Oxygen   O2 (LPM) 60   O2 (FiO2) 100   O2 Daily Delivery Respiratory  Highflow Nasal Cannula

## 2017-09-08 NOTE — ED NOTES
Received report from Avelina FELICIANO.  Iv started,blood and urine sample sent to lab  Updated pt poc

## 2017-09-08 NOTE — ASSESSMENT & PLAN NOTE
- continue zosyn  - d/t choledocholithiasis  - s/p Angioplasty of of choledocojejunostomy anastomoses and push CBD stone into jejunum

## 2017-09-08 NOTE — ED NOTES
Assumed care of pt, report received from Ines FELICIANO. Pt quietly resting on gurney. NAD noted. Pt provided box lunch, NPO at midnight. Aware of POC.

## 2017-09-08 NOTE — PROGRESS NOTES
[facility] Hospitalist Progress Note    Date of Service: 2017    Chief Complaint  83 y.o. male admitted 2017 with syncope and RUQ pain.    Interval Problem Update  Pt went for biliary drain placement, noted significant AMS and respiratory distress post.  Pt noted to have choledocholithiasis and cholangitis causing sepsis.  Has hx of gastrojejunostomy which prevents ERCP from being done.  Discussed with Dr Quan.  Discussed with Dr Thomas.  Pt seen and examined in ICU, ICU care given.  Discussed patient condition and plan with RN, RT and charge nurse / hot rounds.    Consultants/Specialty  Critical care - Dr Quan  GI - Dr Thomas    Disposition  Pt requires additional treatment in the ICU        Review of Systems   Unable to perform ROS: Acuity of condition      Physical Exam  Laboratory/Imaging   Hemodynamics  Temp (24hrs), Av.6 °C (97.9 °F), Min:36.2 °C (97.2 °F), Max:37 °C (98.6 °F)   Temperature: 36.6 °C (97.9 °F)  Pulse  Av.9  Min: 54  Max: 76 Heart Rate (Monitored): (!) 57  Blood Pressure : 153/60, NIBP: 124/60      Respiratory      Respiration: 18, Pulse Oximetry: 93 %, O2 Daily Delivery Respiratory : Nasal Cannula        RUL Breath Sounds: Clear, RML Breath Sounds: Clear, RLL Breath Sounds: Diminished, VENKAT Breath Sounds: Clear, LLL Breath Sounds: Diminished    Fluids    Intake/Output Summary (Last 24 hours) at 17 0954  Last data filed at 17 0800   Gross per 24 hour   Intake             2435 ml   Output              600 ml   Net             1835 ml       Nutrition  Orders Placed This Encounter   Procedures   • DIET NPO     Standing Status:   Standing     Number of Occurrences:   8     Order Specific Question:   Restrict to:     Answer:   Sips with Medications [3]     Physical Exam   Constitutional: He appears well-developed.  Non-toxic appearance. No distress.   HENT:   Head: Normocephalic and atraumatic.   Mouth/Throat: Oropharynx is clear and moist. No oropharyngeal  exudate.   Eyes: Right eye exhibits no discharge. Left eye exhibits no discharge.   Neck: Neck supple. No tracheal deviation, no edema and no erythema present.   Cardiovascular: Normal rate and regular rhythm.  Exam reveals no gallop and no friction rub.    No murmur heard.  Pulmonary/Chest: Accessory muscle usage present. No stridor. He is in respiratory distress. He has no wheezes. He has rales (scattered ).   Abdominal: Soft. Bowel sounds are normal. He exhibits no distension.   Musculoskeletal: He exhibits no edema.   Lymphadenopathy:     He has no cervical adenopathy.   Neurological:   Somnolent and confused    Skin: Skin is warm and dry. No rash noted. He is not diaphoretic. No erythema.   Nursing note and vitals reviewed.      Recent Labs      09/07/17   1104  09/07/17   1710  09/08/17   0540   WBC  12.7*  19.1*  16.1*   RBC  3.69*  3.47*  3.21*   HEMOGLOBIN  11.8*  11.1*  10.2*   HEMATOCRIT  36.5*  34.0*  31.4*   MCV  98.9*  98.0*  97.8   MCH  32.0*  32.0  31.8   MCHC  32.3*  32.6*  32.5*   RDW  13.3  49.0  49.8   PLATELETCT  108*  155*  126*   MPV  12.5*  13.3*  13.4*     Recent Labs      09/07/17   0815  09/07/17   1710  09/08/17   0540   SODIUM  139  136  134*   POTASSIUM  4.7  4.2  4.9   CHLORIDE  107  107  107   CO2  21  18*  17*   GLUCOSE  285*  328*  190*   BUN  33*  31*  28*   CREATININE  1.6*  1.44*  1.40   CALCIUM  8.9  8.6  7.6*     Recent Labs      09/07/17   0815   APTT  17.9*   INR  0.95     Recent Labs      09/07/17   1710   BNPBTYPENAT  203*              Assessment/Plan     * Cholangitis   Assessment & Plan    - continue zosyn  - d/t choledocholithiasis  - now s/p drain placement  - discussed with Dr Thomas, prior surgery makes regular ERCP unavailable  - may be able to use smaller scope or lithotripsy per GI        Metabolic acidemia   Assessment & Plan    - improved, d/t sepsis        Sepsis (CMS-[facility])   Assessment & Plan    This is severe sepsis with the following associated acute organ  dysfunction(s): hepatic failure.   - d/t cholangitis from choledocholithiasis  - continue zosyn  - await culture results  - stone extraction if able  - recommendations per GI         DVT, recurrent, lower extremity, chronic (CMS-HCC)   Assessment & Plan    - xarelto when cleared by GI         CKD (chronic kidney disease)- (present on admission)   Assessment & Plan    - improved from baseline  - repeat bmp in am        Hypothyroidism- (present on admission)   Assessment & Plan    - continue synthroid         Diabetes mellitus (CMS-HCC)- (present on admission)   Assessment & Plan    - continue ISS, adjust as needed        Altered mental status   Assessment & Plan    - likely d/t meds during drain placement however they didn't give large doses  - I did check on him again and he was improving         Acute respiratory failure (CMS-HCC)   Assessment & Plan    - post drain placement  - initially felt it was d/t meds but cxr obtained which looks wet  - now improved on high flow NC        Hemorrhagic disorder due to extrinsic circulating anticoagulants (CMS-HCC)   Assessment & Plan    - holding xarelto         PUD (peptic ulcer disease)   Assessment & Plan    - continue PPI        Choledocholithiasis   Assessment & Plan    - ERCP not available  - now s/p drain        Anemia- (present on admission)   Assessment & Plan    - no sign of gross bleeding  - repeat cbc in am            Reviewed items::  Labs reviewed, Medications reviewed and Radiology images reviewed  DVT prophylaxis - mechanical:  SCDs  Antibiotics:  Treating active infection/contamination beyond 24 hours perioperative coverage

## 2017-09-08 NOTE — PROGRESS NOTES
Received a call from Eder from [facility] about a positive blood smear with Gram Negative Robs and positive blood cultures from [place].    Dr. Cohn was called and updated on these results, and the patient's current antibiotics.      No new orders received at this time but will pass the information to the AM physician for further planning

## 2017-09-08 NOTE — CARE PLAN
Problem: Respiratory:  Goal: Respiratory status will improve    Intervention: Administer and titrate oxygen therapy  Patient provided with O2 during sleep with the goal of keeping his SpO2 greater than 89%      Problem: Peripheral Vascular Perfusion:  Goal: Adequacy of tissue perfusion will improve  Patient assessed for blood pressure continuously there was no need for starting of vasopressive therapy and IV fluid bolus given per protocol for elevated lactic acid.   Intervention: Assess cardiovascular status  Patient BP and HR assessed continuously per protocol in the ICU      Problem: Pain Management  Goal: Pain level will decrease to patient's comfort goal  Patient given medications per MD order to help with abdominal pain

## 2017-09-08 NOTE — PROGRESS NOTES
Gastroenterology Progress Note     Author: Mohsen Thomas   Date & Time Created: 9/8/2017 12:33 PM    CC:  Cholangitis    Interval History:  Underwent percutaneous drain placement of bile ducts by IR but no attempt to remove bile duct stone.  Feels better following drain placement but requiring more oxygen.    Review of Systems:  Review of Systems   Constitutional: Negative for chills and fever.   Respiratory: Positive for shortness of breath.    Cardiovascular: Negative for chest pain.   Gastrointestinal: Positive for abdominal pain. Negative for constipation, diarrhea, nausea and vomiting.       Physical Exam:  Physical Exam   Constitutional: He is oriented to person, place, and time. He appears well-developed and well-nourished.   Cardiovascular: Normal rate and regular rhythm.    Pulmonary/Chest: Effort normal and breath sounds normal.   Abdominal: Soft. Bowel sounds are normal. He exhibits no distension. There is tenderness in the right upper quadrant.   Drain in RUQ   Musculoskeletal: He exhibits no edema.   Neurological: He is alert and oriented to person, place, and time.   Nursing note and vitals reviewed.      Labs:        Invalid input(s): [id number]  Recent Labs      09/07/17 0815 09/07/17 1710   TROPONINI  <0.02  <0.01   BNPBTYPENAT   --   203*     Recent Labs      09/07/17   0815  09/07/17   1710  09/08/17   0540   SODIUM  139  136  134*   POTASSIUM  4.7  4.2  4.9   CHLORIDE  107  107  107   CO2  21  18*  17*   BUN  33*  31*  28*   CREATININE  1.6*  1.44*  1.40   MAGNESIUM  1.8   --    --    CALCIUM  8.9  8.6  7.6*     Recent Labs      09/07/17   0815  09/07/17 1710 09/08/17   0540   ALTSGPT  165*  497*  372*   ASTSGOT  395*  946*  489*   ALKPHOSPHAT  161*  199*  164*   TBILIRUBIN  1.3*  3.0*  3.5*   LIPASE  62*   --    --    GLUCOSE  285*  328*  190*     Recent Labs      09/07/17   0815  09/07/17   1104  09/07/17   1710  09/08/17   0540   RBC  3.50*  3.69*  3.47*  3.21*   HEMOGLOBIN   11.2*  11.8*  11.1*  10.2*   HEMATOCRIT  35.0*  36.5*  34.0*  31.4*   PLATELETCT  126*  108*  155*  126*   PROTHROMBTM  10.4   --    --    --    APTT  17.9*   --    --    --    INR  0.95   --    --    --      Recent Labs      17   0815  17   1104  17   1710  17   0540   WBC  15.3*  12.7*  19.1*  16.1*   NEUTSPOLYS   --    --   90.20*  86.10*   LYMPHOCYTES   --    --   2.80*  6.50*   MONOCYTES   --    --   6.30  6.70   EOSINOPHILS   --    --   0.00  0.10   BASOPHILS   --    --   0.30  0.40   ASTSGOT  395*   --   946*  489*   ALTSGPT  165*   --   497*  372*   ALKPHOSPHAT  161*   --   199*  164*   TBILIRUBIN  1.3*   --   3.0*  3.5*     Hemodynamics:  Temp (24hrs), Av.6 °C (97.9 °F), Min:36.2 °C (97.2 °F), Max:37 °C (98.6 °F)  Temperature: 36.6 °C (97.9 °F)  Pulse  Av.7  Min: 54  Max: 88Heart Rate (Monitored): 69  Blood Pressure : 153/60, NIBP: 103/57     Respiratory:    Respiration: (!) 21, Pulse Oximetry: 97 %, O2 Daily Delivery Respiratory : Highflow Nasal Cannula     Work Of Breathing / Effort: Moderate;Increased Work of Breathing  RUL Breath Sounds: Clear, RML Breath Sounds: Clear, RLL Breath Sounds: Diminished, VENKAT Breath Sounds: Clear, LLL Breath Sounds: Diminished  Fluids:    Intake/Output Summary (Last 24 hours) at 17 1233  Last data filed at 17 1200   Gross per 24 hour   Intake             3035 ml   Output              600 ml   Net             2435 ml     Weight: 70.8 kg (156 lb)  GI/Nutrition:  Orders Placed This Encounter   Procedures   • DIET ORDER     Standing Status:   Standing     Number of Occurrences:   1     Order Specific Question:   Diet:     Answer:   Regular [1]     Medical Decision Making, by Problem:  Active Hospital Problems    Diagnosis   • *Cholangitis [K83.0]   • Sepsis (CMS-HCC) [A41.9]   • Metabolic acidemia [E87.2]   • Obstructive jaundice [K83.8]   • DVT, recurrent, lower extremity, chronic (CMS-HCC) [I82.509]   • Hypothyroidism [E03.9]   •  Diabetes mellitus (CMS-HCC) [E11.9]   • CKD (chronic kidney disease) [N18.9]   • Choledocholithiasis [K80.50]   • PUD (peptic ulcer disease) [K27.9]   • 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  Choledocholithiasis with obstruction and acute cholangitis.  2.  Xarelto anticoagulation therapy for history of deep vein thrombosis.  3.  Abnormal biliary duct MRCP imaging.  4.  History of prior Billroth II procedure for prior peptic ulcer disease  5.  Epigastric abdominal pain radiating to the left upper quadrant.  6.  History of peptic ulcer disease.  7.  Multiple comorbidities as per above.    Plan:  1. Continue antibiotics  2. Monitor liver tests  3. May need to consider attempt at ERCP with pediatric colonoscope once clinical status improves versus external lithotripsy to fragment CBD stone      Reviewed items::  Radiology images reviewed, Labs reviewed and Medications reviewed

## 2017-09-08 NOTE — CONSULTS
[facility]    DATE OF SERVICE:  09/07/2017    TIME:  7:16 p.m.    GASTROENTEROLOGIST:  Hair Mcguire MD    PRIMARY CARE PROVIDER:  Myron Haines MD    REFERRING PHYSICIAN:  Darrel Cohn MD    REASON FOR CONSULTATION:  Abnormal biliary duct imaging.    HISTORY OF PRESENT ILLNESS:  This is an 83-year-old  male with   history of cholecystectomy, presents with epigastric abdominal pain, fevers,   leukocytosis and MRCP imaging showing choledocholithiasis.  In 2010, patient   had an MRCP that revealed intra and extrahepatic biliary ductal dilatation   with fusiform tapering in the head of the pancreas associated with pancreatic   ductal dilatation extending the length of the pancreas.  ERCP by Dr. Mcguire   showed a normal ampulla of Vater.  The distal common biliary duct was   reportedly normal and the proximal common biliary duct was dilated to 9.6 mm   and the common hepatic duct was dilated to 18 mm.  There was no evidence of a   stricture or filling defect.  The intrahepatic ducts were moderately dilated   and the mass like confluence close to the gallbladder fossa seen on MRCP was   felt to be an imaging artifact.  Patient has had recurrent E. coli bacteremia   for ascending cholangitis and this has required treatment with antibiotics.    In his list of surgical history, he has supposedly had a gastric bypass;   however, ERCP from September 2010 by Dr. Mcguire did not reveal this.  Patient   is unsure about his past surgical history.  EGD from 04/21/2011 revealed prior   gastric surgery, and there is no comment of what type, but it states that   there was a normal jejunum thus suggestive of an actual gastric bypass or   gastrojejunostomy.    Patient has noticed acute onset of epigastric abdominal pain radiating to his   left upper quadrant, sharp and dull in quality, colicky, associated with   fevers and rigors or chills.  He denied any jaundice.  He denied further   modifying factors, associated  symptoms or timing issues with his complaints.    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 Mcguire in 2010,   chronic pancreatitis, history of prostate cancer, diabetes type 2, recurrent   biliary obstructions, ascending cholangitis, hypothyroidism, history of peptic   ulcer disease, heart murmur.    PAST SURGICAL HISTORY:  Thyroidectomy, prostatectomy, abdominal exploration,   cholecystectomy, splenectomy, thyroidectomy, umbilical hernia repair with   mesh, gastric bypass, corneal lens replacement.    ALLERGIES:  No known drug allergies.    OUTPATIENT MEDICATIONS:  Synthroid, Xarelto, vitamin D, insulin.    SOCIAL HISTORY:  Retired tool and [employer] , .  Drinks alcohol   rarely in the past.  Denied tobacco, illicit drug use, admits to tattoos.    FAMILY HISTORY:  Denied family history of colorectal cancer, gastric cancer,   or pancreatic cancer.    REVIEW OF SYSTEMS:  As per HPI, past medical history, past surgical history   sections, otherwise greater than 10 systems negative including constitutional,   head, ears, eyes, nose, throat, pulmonary, cardiovascular, GI, genital,   rheumatological, psychiatric, neurological, dermatological, hematological,   oncological, musculoskeletal.    PHYSICAL EXAMINATION:  VITAL SIGNS:  Temperature 98.6, respirations 18, pulse 69, blood pressure   140/56, weight 70.76 kilograms.  GENERAL:  A well-developed white male in no apparent distress, alert and   oriented.  HEENT:  Atraumatic, normocephalic.  Eyes, extraocular movements intact.    Icteric sclerae.  Nose, no erythema or discharge.  Mouth, no erythema or   discharge.  No thrush noted.  NECK:  Supple.  No lymphadenopathy or JVD.  PULMONARY:  Clear to auscultation bilaterally.  CARDIOVASCULAR:  Regular rate and rhythm with systolic murmur.  No rub or   gallop.  ABDOMEN:  Nondistended, tender at the epigastrium, but no  rebound.  No   appreciable hepatosplenomegaly, ascites, or ecchymosis.  Midline surgical   scars noted with mesh and hernias.  DERMATOLOGIC:  No spider angiomas or palmar erythema.  NEUROLOGICAL:  No focal deficits appreciated.  No asterixis.  MUSCULOSKELETAL:  Moving all extremities.  Strength bilaterally equal   throughout.  PSYCHIATRIC:  Appropriate mood, affect, interaction, and insight.    LABORATORY DATA:  WBC 19.1, hemoglobin 11.1, hematocrit 34, MCV 98, platelet   count 155.  Sodium 136, potassium 4.6, chloride 107, bicarbonate 18, BUN 31,   creatinine 1.44, glucose 228, calcium 8.6, AST 90, , , alkaline   phosphatase 199, total bilirubin 3, albumin 3.8, magnesium 1.8, lipase 63,   lactic acid 2.9.  Troponin less than 0.01.  .  INR 0.95.  Blood type AB   positive.    IMAGING:  MRCP on 09/07/2017, moderate intrahepatic and extrahepatic biliary   ductal dilatation.  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  EKG on 09/07/2017, sinus rhythm, early precordial RS   transition.    IMPRESSION:  An 83-year-old  male with a history suggestive of prior   gastrojejunostomy, gastric bypass surgery likely from bleeding peptic ulcer   disease, presents with choledocholithiasis with obstruction and acute   cholangitis, unfortunately, on Xarelto anticoagulation, long-term   anticoagulation therapy, found to have abnormal biliary duct imaging on MRCP,   also with epigastric and left upper quadrant abdominal pain.    Comorbidities include diabetes, hypothyroidism, history of prostate cancer,   history of peptic ulcer disease, cholangitis, choledocholithiasis,   anticoagulation therapy and as per above.    PROBLEMS:  1.  Choledocholithiasis with obstruction and acute cholangitis.  2.  Xarelto anticoagulation therapy for  history of deep vein thrombosis.  3.  Abnormal biliary duct MRCP imaging.  4.  History of gastric bypass.  5.  Epigastric abdominal pain radiating to the left upper quadrant.  6.  History of peptic ulcer disease.  7.  Multiple comorbidities as per above.    RECOMMENDATIONS:  1.  Zosyn antibiotic coverage.  2.  Agree with blood cultures.  3.  Unfortunately, patient is not a candidate for an ERCP due to his history   of gastric bypass, gastrojejunostomy as ampulla of Vater would not be   endoscopically accessible.  Thus, patient will need percutaneous transhepatic   cholangiography with percutaneous biliary drain and eventual lithotripsy via   this method.  4.  If the patient needs a subsequent outpatient GI followup, he prefers to   follow up at our [place] location and has seen both Dr. Hair Mcguire there   as well as Bryan Frey PA-C.  5. Please note that Dr. Mohsen Thomas will be overtaking patient's care   starting tomorrow on 09/08/2017.    Dear Dr. Chuck Cohn,    Thank you for asking me to evaluate the patient in consultation.  Please refer   to my consult note as per above for details of recommendations.  Please feel   free to call us with any questions.       ____________________________________     MD BARON MCLEOD / DOUG    DD:  09/07/2017 19:31:44  DT:  09/07/2017 20:52:02    D#:  [id number]  Job#:  [id number]    cc: CHUCK COHN MD, BRANNON ROMERO MD, MOHSEN THOMAS MD

## 2017-09-08 NOTE — PROGRESS NOTES
Spoke with Davian Funk about the patient's pain received an order for a 1 time dose of Norco 5/325

## 2017-09-08 NOTE — PROGRESS NOTES
Pt underwent a percutaneous transhepatic cholangiogram with drain placement performed by Dr Huerta. Pt came down on 2 L nasal canula sating at 90%. Pt placed on 10 L oxymask during procedure. Pt remained hemodynamically stable throughout the procedure.  No adverse reaction noted. Drain to right side held in place with gauze and medipore; cdi.  Following procedure, pt was maintaing O2 sats in the low 90s on 10 L O2. ICU RN, Everette, came down to transport pt back to ICU and while giving report, pt fell to 88% on 10 L. O2 increased to 15% and non-rebreather placed on pt. Pt remained below 90%. ICU RN called MD to report change in O2 sats. Respiratory therapy was also called. This RN updated radiologist, Dr Huerta, on pt condition and narcan order obtained per ICU RN request. ICU RN administered narcan per MD order.  Respiratory therapist and second ICU RN came down and pt was transported back to ICU.     Drain - [employer] Flexima RO Regular, 8F x 35 cm, lot # [id number], ref # [id number]

## 2017-09-08 NOTE — PROGRESS NOTES
RN arrived at IR to transport pt back to RICU-110. Pt's oxygenation level between 88-90 on 10L Oxymask. Pt arousable, HR 90s, BP 140s, c/o pain in stomach as earlier in the shift before procedure. Nonrebreather placed on pt, no improvement in saturation level. Dr. Gallegos called, informed of vitals and patient level of consciousness. Dr. Huerta called by IR personnel, orders given for Narcan X 1. Narcan Given by Primary RN. Primary RN called Rapid Response RN and RT for assistance. RR RN, Primary RN and RRT transported pt to the unit on nonrebreather, tele monitor, pulse ox, bp cuff, ambu-bag. Pt stable during transfer, arrived to RICU-10. Placed on high-flow nasal cannula per MD order. STAT CXR ordered by Dr. Quan after MD assessed pt at bedside.

## 2017-09-08 NOTE — ASSESSMENT & PLAN NOTE
This WAS  severe sepsis with the following associated acute organ dysfunction(s): hepatic failure.   - d/t cholangitis from choledocholithiasis. Strep Viridans and E. Coli growing from cultures.     Sepsis is RESOLVED    - continue iv rocephin

## 2017-09-09 ENCOUNTER — APPOINTMENT (OUTPATIENT)
Dept: RADIOLOGY | Facility: MEDICAL CENTER | Age: 82
DRG: 853 | End: 2017-09-09
Attending: INTERNAL MEDICINE
Payer: MEDICARE

## 2017-09-09 LAB
ALBUMIN SERPL BCP-MCNC: 2.5 G/DL (ref 3.2–4.9)
ALBUMIN/GLOB SERPL: 0.9 G/DL
ALP SERPL-CCNC: 91 U/L (ref 30–99)
ALT SERPL-CCNC: 176 U/L (ref 2–50)
ANION GAP SERPL CALC-SCNC: 9 MMOL/L (ref 0–11.9)
ANISOCYTOSIS BLD QL SMEAR: ABNORMAL
APTT PPP: 32.7 SEC (ref 24.7–36)
AST SERPL-CCNC: 142 U/L (ref 12–45)
BASOPHILS # BLD AUTO: 0 % (ref 0–1.8)
BASOPHILS # BLD: 0 K/UL (ref 0–0.12)
BILIRUB SERPL-MCNC: 2.1 MG/DL (ref 0.1–1.5)
BUN SERPL-MCNC: 34 MG/DL (ref 8–22)
BURR CELLS BLD QL SMEAR: NORMAL
CALCIUM SERPL-MCNC: 6.4 MG/DL (ref 8.5–10.5)
CHLORIDE SERPL-SCNC: 113 MMOL/L (ref 96–112)
CO2 SERPL-SCNC: 15 MMOL/L (ref 20–33)
CREAT SERPL-MCNC: 1.74 MG/DL (ref 0.5–1.4)
EOSINOPHIL # BLD AUTO: 0 K/UL (ref 0–0.51)
EOSINOPHIL NFR BLD: 0 % (ref 0–6.9)
ERYTHROCYTE [DISTWIDTH] IN BLOOD BY AUTOMATED COUNT: 55.1 FL (ref 35.9–50)
GFR SERPL CREATININE-BSD FRML MDRD: 38 ML/MIN/1.73 M 2
GLOBULIN SER CALC-MCNC: 2.8 G/DL (ref 1.9–3.5)
GLUCOSE BLD-MCNC: 130 MG/DL (ref 65–99)
GLUCOSE BLD-MCNC: 174 MG/DL (ref 65–99)
GLUCOSE BLD-MCNC: 188 MG/DL (ref 65–99)
GLUCOSE BLD-MCNC: 237 MG/DL (ref 65–99)
GLUCOSE SERPL-MCNC: 135 MG/DL (ref 65–99)
GRAM STN SPEC: NORMAL
HCT VFR BLD AUTO: 31.7 % (ref 42–52)
HGB BLD-MCNC: 10 G/DL (ref 14–18)
LYMPHOCYTES # BLD AUTO: 1.01 K/UL (ref 1–4.8)
LYMPHOCYTES NFR BLD: 5.2 % (ref 22–41)
MAGNESIUM SERPL-MCNC: 1.5 MG/DL (ref 1.5–2.5)
MANUAL DIFF BLD: ABNORMAL
MCH RBC QN AUTO: 31.5 PG (ref 27–33)
MCHC RBC AUTO-ENTMCNC: 31.5 G/DL (ref 33.7–35.3)
MCV RBC AUTO: 100 FL (ref 81.4–97.8)
METAMYELOCYTES NFR BLD MANUAL: 0.9 %
MONOCYTES # BLD AUTO: 0 K/UL (ref 0–0.85)
MONOCYTES NFR BLD AUTO: 0 % (ref 0–13.4)
MORPHOLOGY BLD-IMP: NORMAL
NEUTROPHILS # BLD AUTO: 18.22 K/UL (ref 1.82–7.42)
NEUTROPHILS NFR BLD: 77.4 % (ref 44–72)
NEUTS BAND NFR BLD MANUAL: 16.5 % (ref 0–10)
NRBC # BLD AUTO: 0 K/UL
NRBC BLD AUTO-RTO: 0 /100 WBC
PHOSPHATE SERPL-MCNC: 3.3 MG/DL (ref 2.5–4.5)
PLATELET # BLD AUTO: 98 K/UL (ref 164–446)
PLATELET BLD QL SMEAR: NORMAL
PMV BLD AUTO: 14.1 FL (ref 9–12.9)
POIKILOCYTOSIS BLD QL SMEAR: NORMAL
POTASSIUM SERPL-SCNC: 4.1 MMOL/L (ref 3.6–5.5)
PROT SERPL-MCNC: 5.3 G/DL (ref 6–8.2)
RBC # BLD AUTO: 3.17 M/UL (ref 4.7–6.1)
RBC BLD AUTO: PRESENT
SIGNIFICANT IND 70042: NORMAL
SITE SITE: NORMAL
SODIUM SERPL-SCNC: 137 MMOL/L (ref 135–145)
SOURCE SOURCE: NORMAL
WBC # BLD AUTO: 19.4 K/UL (ref 4.8–10.8)

## 2017-09-09 PROCEDURE — 700102 HCHG RX REV CODE 250 W/ 637 OVERRIDE(OP): Performed by: HOSPITALIST

## 2017-09-09 PROCEDURE — 700102 HCHG RX REV CODE 250 W/ 637 OVERRIDE(OP): Performed by: INTERNAL MEDICINE

## 2017-09-09 PROCEDURE — 71010 DX-CHEST-PORTABLE (1 VIEW): CPT

## 2017-09-09 PROCEDURE — 83735 ASSAY OF MAGNESIUM: CPT

## 2017-09-09 PROCEDURE — A9270 NON-COVERED ITEM OR SERVICE: HCPCS | Performed by: INTERNAL MEDICINE

## 2017-09-09 PROCEDURE — 85730 THROMBOPLASTIN TIME PARTIAL: CPT

## 2017-09-09 PROCEDURE — 87205 SMEAR GRAM STAIN: CPT

## 2017-09-09 PROCEDURE — 80053 COMPREHEN METABOLIC PANEL: CPT

## 2017-09-09 PROCEDURE — 99233 SBSQ HOSP IP/OBS HIGH 50: CPT | Performed by: INTERNAL MEDICINE

## 2017-09-09 PROCEDURE — 700105 HCHG RX REV CODE 258: Performed by: INTERNAL MEDICINE

## 2017-09-09 PROCEDURE — 770022 HCHG ROOM/CARE - ICU (200)

## 2017-09-09 PROCEDURE — 85027 COMPLETE CBC AUTOMATED: CPT

## 2017-09-09 PROCEDURE — 85007 BL SMEAR W/DIFF WBC COUNT: CPT

## 2017-09-09 PROCEDURE — 700111 HCHG RX REV CODE 636 W/ 250 OVERRIDE (IP): Performed by: INTERNAL MEDICINE

## 2017-09-09 PROCEDURE — 87186 SC STD MICRODIL/AGAR DIL: CPT | Mod: 91

## 2017-09-09 PROCEDURE — 87077 CULTURE AEROBIC IDENTIFY: CPT

## 2017-09-09 PROCEDURE — 84100 ASSAY OF PHOSPHORUS: CPT

## 2017-09-09 PROCEDURE — 87070 CULTURE OTHR SPECIMN AEROBIC: CPT

## 2017-09-09 PROCEDURE — 82962 GLUCOSE BLOOD TEST: CPT | Mod: 91

## 2017-09-09 PROCEDURE — 700105 HCHG RX REV CODE 258: Performed by: HOSPITALIST

## 2017-09-09 PROCEDURE — 700111 HCHG RX REV CODE 636 W/ 250 OVERRIDE (IP): Performed by: HOSPITALIST

## 2017-09-09 PROCEDURE — 94640 AIRWAY INHALATION TREATMENT: CPT

## 2017-09-09 PROCEDURE — A9270 NON-COVERED ITEM OR SERVICE: HCPCS | Performed by: HOSPITALIST

## 2017-09-09 RX ORDER — OXYCODONE HYDROCHLORIDE 5 MG/1
5 TABLET ORAL EVERY 4 HOURS PRN
Status: DISCONTINUED | OUTPATIENT
Start: 2017-09-09 | End: 2017-09-14 | Stop reason: HOSPADM

## 2017-09-09 RX ORDER — MAGNESIUM SULFATE HEPTAHYDRATE 40 MG/ML
2 INJECTION, SOLUTION INTRAVENOUS ONCE
Status: COMPLETED | OUTPATIENT
Start: 2017-09-09 | End: 2017-09-09

## 2017-09-09 RX ADMIN — PIPERACILLIN SODIUM AND TAZOBACTAM SODIUM 4.5 G: 4; .5 INJECTION, POWDER, FOR SOLUTION INTRAVENOUS at 05:18

## 2017-09-09 RX ADMIN — INSULIN LISPRO 4 UNITS: 100 INJECTION, SOLUTION INTRAVENOUS; SUBCUTANEOUS at 14:34

## 2017-09-09 RX ADMIN — MAGNESIUM SULFATE HEPTAHYDRATE 2 G: 40 INJECTION, SOLUTION INTRAVENOUS at 09:30

## 2017-09-09 RX ADMIN — INSULIN LISPRO 3 UNITS: 100 INJECTION, SOLUTION INTRAVENOUS; SUBCUTANEOUS at 18:52

## 2017-09-09 RX ADMIN — PIPERACILLIN SODIUM AND TAZOBACTAM SODIUM 4.5 G: 4; .5 INJECTION, POWDER, FOR SOLUTION INTRAVENOUS at 00:01

## 2017-09-09 RX ADMIN — OXYCODONE HYDROCHLORIDE 5 MG: 5 TABLET ORAL at 08:48

## 2017-09-09 RX ADMIN — CALCIUM GLUCONATE 1 G: 94 INJECTION, SOLUTION INTRAVENOUS at 11:11

## 2017-09-09 RX ADMIN — LEVOTHYROXINE SODIUM 125 MCG: 125 TABLET ORAL at 06:12

## 2017-09-09 RX ADMIN — PIPERACILLIN SODIUM AND TAZOBACTAM SODIUM 4.5 G: 4; .5 INJECTION, POWDER, FOR SOLUTION INTRAVENOUS at 11:27

## 2017-09-09 RX ADMIN — INSULIN LISPRO 3 UNITS: 100 INJECTION, SOLUTION INTRAVENOUS; SUBCUTANEOUS at 00:07

## 2017-09-09 RX ADMIN — PIPERACILLIN AND TAZOBACTAM 2.25 G: 2; .25 INJECTION, POWDER, LYOPHILIZED, FOR SOLUTION INTRAVENOUS; PARENTERAL at 18:51

## 2017-09-09 RX ADMIN — OXYCODONE HYDROCHLORIDE 5 MG: 5 TABLET ORAL at 03:07

## 2017-09-09 ASSESSMENT — PAIN SCALES - GENERAL
PAINLEVEL_OUTOF10: 0
PAINLEVEL_OUTOF10: 1
PAINLEVEL_OUTOF10: 4
PAINLEVEL_OUTOF10: 1
PAINLEVEL_OUTOF10: 1
PAINLEVEL_OUTOF10: 6
PAINLEVEL_OUTOF10: 1
PAINLEVEL_OUTOF10: 3
PAINLEVEL_OUTOF10: 6
PAINLEVEL_OUTOF10: 1
PAINLEVEL_OUTOF10: 0
PAINLEVEL_OUTOF10: 0

## 2017-09-09 ASSESSMENT — ENCOUNTER SYMPTOMS
CONSTIPATION: 0
SHORTNESS OF BREATH: 1
COUGH: 0
SHORTNESS OF BREATH: 0
VOMITING: 0
DIARRHEA: 0
DEPRESSION: 0
STRIDOR: 0
ABDOMINAL PAIN: 1
HEADACHES: 0
NAUSEA: 0
SPUTUM PRODUCTION: 0
LOSS OF CONSCIOUSNESS: 0
TINGLING: 0
ABDOMINAL PAIN: 0
PALPITATIONS: 0
FALLS: 0
DIZZINESS: 0
MYALGIAS: 0
CHILLS: 0
FEVER: 0
WEAKNESS: 1

## 2017-09-09 ASSESSMENT — PATIENT HEALTH QUESTIONNAIRE - PHQ9
SUM OF ALL RESPONSES TO PHQ QUESTIONS 1-9: 0
SUM OF ALL RESPONSES TO PHQ9 QUESTIONS 1 AND 2: 0
1. LITTLE INTEREST OR PLEASURE IN DOING THINGS: NOT AT ALL
2. FEELING DOWN, DEPRESSED, IRRITABLE, OR HOPELESS: NOT AT ALL

## 2017-09-09 ASSESSMENT — LIFESTYLE VARIABLES
ALCOHOL_USE: NO
EVER_SMOKED: NEVER

## 2017-09-09 NOTE — PROGRESS NOTES
Pulmonary Critical Care Progress Note        DOS:  9/9/2017    Chief Complaint: SOB    History of Present Illness: 83 y.o. male admitted for sepsis, CBD stone identified, cholangits diagnoses.  Transhepatic drain place in IR 9/8 and post procedure O2 requirements went from 2 lpm to a NRBM -> HF NC O2.  CXR revealed atelectasis and pulmonary edema.      ROS:    Respiratory: negative cough, negative hemoptysis, negative pleuritic chest pain, positive shortness of breath, negative sputum production and negative wheezing,   Cardiac: negative chest pain, negative palpitations, positive orthopnea, negative leg swelling and positive PND,   GI: positive nausea, negative vomiting, positive abdominal pain and negative diarrhea.     Interval Events:  24 hour interval history reviewed     Alert and following well  HF NC 35-40 lpm/40%  Hemodynamics ok - no low Bp  E coli in blood x 2 - sens pending  Transhepatic drain - 210cc -> no culures sent per RN  Afebrile - WBC better  CXR better edema/ATX  Creat trending up, LFTs trending down    PFSH:  No change.    Respiratory:     Pulse Oximetry: 93 %  HF NC O2  WOB elevated - better vs yesterday          Exam: labored breathing, rales bibasilar and diminished breath sounds mild  ImagingAvailable data reviewed         Invalid input(s): [id number]    HemoDynamics:  Pulse: 63, Heart Rate (Monitored): (!) 167  NIBP: 115/66       Exam: regular rhythm (Sinus), no ectopy, edema, ok pulses and cap refill  Imaging: Available data reviewed  Recent Labs      09/07/17   0815  09/07/17   1710   TROPONINI  <0.02  <0.01   BNPBTYPENAT   --   203*       Neuro:  GCS Total Sharon Coma Score: 15       Exam: no focal deficits noted mental status intact, slow  Imaging: Available data reviewed    Fluids:  Intake/Output       09/07/17 0700 - 09/08/17 0659 09/08/17 0700 - 09/09/17 0659 09/09/17 0700 - 09/10/17 0659      [id number] 3494-5294 Total 9084-0183 3300-6245 Total 6266-6591 2216-7799 Total        Intake    P.O.  --  300 300  --  360 360  --  -- --    P.O. -- 300 300 -- 360 360 -- -- --    I.V.  --  1875 1875  1610  975 2585  --  -- --    IV Piggyback Volume (IV Piggyback) -- 200 200 100 100 200 -- -- --    IV Volume (NS) -- 1625 1625 7121 902 1777 -- -- --    IV Volume (TKO) -- 50 50 10 -- 10 -- -- --    Enteral  --  -- --  20  -- 20  --  -- --    Free Water / Tube Flush -- -- -- 20 -- 20 -- -- --    Total Intake -- 2175 2175 1630 1335 2965 -- -- --       Output    Urine  --  600 600  200  325 525  --  -- --    Number of Times Voided -- -- -- 1 x 1 x 2 x -- -- --    Void (ml) -- 600 600 200 325 525 -- -- --    Drains  --  -- --  110  210 320  --  -- --    Other 1 -- -- -- 110 210 320 -- -- --    Stool  --  -- --  --  -- --  --  -- --    Number of Times Stooled -- 1 x 1 x -- 0 x 0 x -- -- --    Total Output -- 600 600 310 535 845 -- -- --       Net I/O     -- 1575 1575 3021 560 7910 -- -- --           Recent Labs      09/07/17 0815 09/07/17 1710 09/08/17   0540   SODIUM  139  136  134*   POTASSIUM  4.7  4.2  4.9   CHLORIDE  107  107  107   CO2  21  18*  17*   BUN  33*  31*  28*   CREATININE  1.6*  1.44*  1.40   MAGNESIUM  1.8   --    --    CALCIUM  8.9  8.6  7.6*       GI/Nutrition:  Exam:  R lateral transhepatic biliary drain noted, BS positive, mild RUQ tenderness, ND, no CVAT  Imaging: Available data reviewed  taking PO  Liver Function  Recent Labs      09/07/17 0815 09/07/17 1710 09/08/17   0540   ALTSGPT  165*  497*  372*   ASTSGOT  395*  946*  489*   ALKPHOSPHAT  161*  199*  164*   TBILIRUBIN  1.3*  3.0*  3.5*   LIPASE  62*   --    --    GLUCOSE  285*  328*  190*       Heme:  Recent Labs      09/07/17   0815   09/07/17   1710  09/08/17   0540  09/08/17   1738   RBC  3.50*   < >  3.47*  3.21*  3.39*   HEMOGLOBIN  11.2*   < >  11.1*  10.2*  10.8*   HEMATOCRIT  35.0*   < >  34.0*  31.4*  33.3*   PLATELETCT  126*   < >  155*  126*  104*   PROTHROMBTM  10.4   --    --    --    --    APTT  17.9*    --    --    --    --    INR  0.95   --    --    --    --     < > = values in this interval not displayed.       Infectious Disease:  Temp  Av.8 °C (98.2 °F)  Min: 36.2 °C (97.2 °F)  Max: 37.4 °C (99.3 °F)  Micro: reviewed      -  BC from [facility] positive for Escherichia coli  Biliary fluid cultures pending from     Recent Labs      17   0815   17   1710  17   0540  17   1738   WBC  15.3*   < >  19.1*  16.1*  22.0*   NEUTSPOLYS   --    --   90.20*  86.10*  89.50*   LYMPHOCYTES   --    --   2.80*  6.50*  0.90*   MONOCYTES   --    --   6.30  6.70  5.20   EOSINOPHILS   --    --   0.00  0.10  0.00   BASOPHILS   --    --   0.30  0.40  0.00   ASTSGOT  395*   --   946*  489*   --    ALTSGPT  165*   --   497*  372*   --    ALKPHOSPHAT  161*   --   199*  164*   --    TBILIRUBIN  1.3*   --   3.0*  3.5*   --     < > = values in this interval not displayed.     Current Facility-Administered Medications   Medication Dose Frequency Provider Last Rate Last Dose   • oxycodone immediate-release (ROXICODONE) tablet 5 mg  5 mg Q4HRS PRN ANDREW MeyersONayeli   5 mg at 17 0307   • levothyroxine (SYNTHROID) tablet 125 mcg  125 mcg AM ES Darrel Cohn M.D.   125 mcg at 17 0554   • senna-docusate (PERICOLACE or SENOKOT S) 8.6-50 MG per tablet 2 Tab  2 Tab BID Darrel Cohn M.D.   Stopped at 17 2100    And   • polyethylene glycol/lytes (MIRALAX) PACKET 1 Packet  1 Packet QDAY PRN Darrel Cohn M.D.        And   • magnesium hydroxide (MILK OF MAGNESIA) suspension 30 mL  30 mL QDAY PRN Darrel Cohn M.D.        And   • bisacodyl (DULCOLAX) suppository 10 mg  10 mg QDAY PRN Darrel Cohn M.D.       • Respiratory Care per Protocol   Continuous RT Darrel Cohn M.D.       • NS infusion   Continuous Darrel Cohn M.D. 125 mL/hr at 17     • acetaminophen (TYLENOL) tablet 650 mg  650 mg Q6HRS PRN Darrel Cohn M.D.       •  piperacillin-tazobactam (ZOSYN) 4.5 g in  mL IVPB  4.5 g Q6HRS Darrel Cohn M.D. 100 mL/hr at 09/09/17 0518 4.5 g at 09/09/17 0518   • insulin lispro (HUMALOG) injection 3-14 Units  3-14 Units Q6HRS Darrel Cohn M.D.   3 Units at 09/09/17 0007   • ondansetron (ZOFRAN) syringe/vial injection 4 mg  4 mg Q4HRS PRN Darrel Cohn M.D.   4 mg at 09/08/17 0724   • ondansetron (ZOFRAN ODT) dispertab 4 mg  4 mg Q4HRS PRN Darrel Cohn M.D.       • glucose 4 g chewable tablet 16 g  16 g Q15 MIN PRN Darrel Cohn M.D.        And   • dextrose 50% (D50W) injection 25 mL  25 mL Q15 MIN PRN Darrel Cohn M.D.         Last reviewed on 9/7/2017  5:03 PM by Jl Hays PhT    Quality  Measures:  Core Measures & Quality Metrics    Problems/plan:  Sepsis syndrome secondary to cholangitis and obstructing stone 5x10 mm - CBD.   Sepsis protocols   IV ABX   Perc biliary drain   Remove stone when improved by IR - early next week?   Fluids/pressors PRN   GI evaluating  Bacteremia with E. coli presumably biliary source.   Repeat BC   Send fluid fromn biliary tree for culture - missed in IR per RN!  Status post syncopal spell presumably secondary to above.   further eval if recurrent  Acute hypoxemic respiratory failure, placement of hepatobiliary drain and decompression of obstruction, presumably secondary to atelectasis and pulmonary edema.   RT protocols   Improved with time and pulmonary toilet   Consider forced diuresis   Wean HF NC O2   Serial CXR   IS/PEP - IPV PRN  Lifelong nonsmoker.  History of multiple abdominal surgeries including gastrojejunostomy, cholecystectomy complex anatomy, not a candidate for ERCP.   GI evaluating  History of deep venous thrombosis and prior Xarelto use, stopped yesterday.   Sequentials   Lovenox   Resume novel agent when/if clinically appropriate  Anemia and thrombocytopenia, chronic, mild.   Transfusion protocols  Hyponatremia secondary to above.  History of diabetes  with uncontrolled blood sugars - SSI/longacting as needed  Elevated liver function studies and alkaline phosphatase secondary to cholangitis.   Trend CMP etc  Hypothyroidism, on replacement, history of thyroidectomy, replete  History of radical prostatectomy for cancer  History of peptic ulcer disease.  Therapies  Enteral nutrition if ok with GI  Prophylaxis  DNR    Discussed patient condition and risk of morbidity and/or mortality with Family, RN, RT, Pharmacy, Charge nurse / hot rounds, GI and hospitalist and IR MD yesterday.    The patient remains critically ill.  Critical care time = 32 minutes in directly providing and coordinating critical care and extensive data review.  No time overlap and excludes procedures.

## 2017-09-09 NOTE — CARE PLAN
Problem: Safety  Goal: Will remain free from falls    Intervention: Implement fall precautions  Call light within reach, bed exit alarm on, discussed no self-harm and use of call light. Patient understands to call for assistance before getting out of bed.       Problem: Venous Thromboembolism (VTW)/Deep Vein Thrombosis (DVT) Prevention:  Goal: Patient will participate in Venous Thrombosis (VTE)/Deep Vein Thrombosis (DVT)Prevention Measures    Intervention: Ensure patient wears graduated elastic stockings (CLAUDIO hose) and/or SCDs, if ordered, when in bed or chair (Remove at least once per shift for skin check)  SCD's correctly in place.       Problem: Mobility  Goal: Risk for activity intolerance will decrease    Intervention: Encourage patient to increase activity level in collaboration with Interdisciplinary Team  Assisted ambulation. Patient stood up to use urinal with standby assist. Steady gait, tolerated well.

## 2017-09-09 NOTE — PROGRESS NOTES
Gastroenterology Progress Note     Author: Mohsen Thomas   Date & Time Created: 9/9/2017 1:39 PM    CC:  Cholangitis    Interval History:  Denies abdominal pain.  Liver tests improving.  Still requiring high oxygen.    Review of Systems:  Review of Systems   Constitutional: Negative for chills and fever.   Respiratory: Positive for shortness of breath.    Cardiovascular: Negative for chest pain.   Gastrointestinal: Negative for abdominal pain, constipation, diarrhea, nausea and vomiting.       Physical Exam:  Physical Exam   Constitutional: He is oriented to person, place, and time. He appears well-developed and well-nourished.   Cardiovascular: Normal rate and regular rhythm.    Pulmonary/Chest: Effort normal and breath sounds normal.   Abdominal: Soft. Bowel sounds are normal. He exhibits no distension. There is tenderness in the right upper quadrant.   Drain in RUQ   Musculoskeletal: He exhibits no edema.   Neurological: He is alert and oriented to person, place, and time.   Nursing note and vitals reviewed.      Labs:        Invalid input(s): [id number]  Recent Labs      09/07/17 0815 09/07/17 1710   TROPONINI  <0.02  <0.01   BNPBTYPENAT   --   203*     Recent Labs      09/07/17   0815  09/07/17   1710 09/08/17   0540  09/09/17   0730   SODIUM  139  136  134*  137   POTASSIUM  4.7  4.2  4.9  4.1   CHLORIDE  107  107  107  113*   CO2  21  18*  17*  15*   BUN  33*  31*  28*  34*   CREATININE  1.6*  1.44*  1.40  1.74*   MAGNESIUM  1.8   --    --   1.5   PHOSPHORUS   --    --    --   3.3   CALCIUM  8.9  8.6  7.6*  6.4*     Recent Labs      09/07/17   0815  09/07/17   1710 09/08/17   0540  09/09/17   0730   ALTSGPT  165*  497*  372*  176*   ASTSGOT  395*  946*  489*  142*   ALKPHOSPHAT  161*  199*  164*  91   TBILIRUBIN  1.3*  3.0*  3.5*  2.1*   LIPASE  62*   --    --    --    GLUCOSE  285*  328*  190*  135*     Recent Labs      09/07/17   0815   09/08/17   0540  09/08/17   1738  09/09/17   0451    RBC  3.50*   < >  3.21*  3.39*  3.17*   HEMOGLOBIN  11.2*   < >  10.2*  10.8*  10.0*   HEMATOCRIT  35.0*   < >  31.4*  33.3*  31.7*   PLATELETCT  126*   < >  126*  104*  98*   PROTHROMBTM  10.4   --    --    --    --    APTT  17.9*   --    --    --   32.7   INR  0.95   --    --    --    --     < > = values in this interval not displayed.     Recent Labs      17   1710  17   0540  17   1738  17   0451  17   0730   WBC  19.1*  16.1*  22.0*  19.4*   --    NEUTSPOLYS  90.20*  86.10*  89.50*  77.40*   --    LYMPHOCYTES  2.80*  6.50*  0.90*  5.20*   --    MONOCYTES  6.30  6.70  5.20  0.00   --    EOSINOPHILS  0.00  0.10  0.00  0.00   --    BASOPHILS  0.30  0.40  0.00  0.00   --    ASTSGOT  946*  489*   --    --   142*   ALTSGPT  497*  372*   --    --   176*   ALKPHOSPHAT  199*  164*   --    --   91   TBILIRUBIN  3.0*  3.5*   --    --   2.1*     Hemodynamics:  Temp (24hrs), Av.6 °C (97.9 °F), Min:36.1 °C (97 °F), Max:37.4 °C (99.3 °F)  Temperature: 36.1 °C (97 °F)  Pulse  Av.5  Min: 54  Max: 88Heart Rate (Monitored): (!) 59  NIBP: 129/59     Respiratory:    Respiration: 13, Pulse Oximetry: 96 %, O2 Daily Delivery Respiratory : Silicone Nasal Cannula     Work Of Breathing / Effort: Mild  RUL Breath Sounds: Clear, RML Breath Sounds: Clear, RLL Breath Sounds: Diminished, VENKAT Breath Sounds: Clear, LLL Breath Sounds: Diminished  Fluids:    Intake/Output Summary (Last 24 hours) at 17 1233  Last data filed at 17 1200   Gross per 24 hour   Intake             3035 ml   Output              600 ml   Net             2435 ml        GI/Nutrition:  Orders Placed This Encounter   Procedures   • DIET ORDER     Standing Status:   Standing     Number of Occurrences:   1     Order Specific Question:   Diet:     Answer:   Regular [1]     Medical Decision Making, by Problem:  Active Hospital Problems    Diagnosis   • *Cholangitis [K83.0]   • Sepsis (CMS-HCC) [A41.9]   • Metabolic acidemia  [E87.2]   • Obstructive jaundice [K83.8]   • DVT, recurrent, lower extremity, chronic (CMS-HCC) [I82.509]   • Hypothyroidism [E03.9]   • Diabetes mellitus (CMS-HCC) [E11.9]   • CKD (chronic kidney disease) [N18.9]   • Choledocholithiasis [K80.50]   • PUD (peptic ulcer disease) [K27.9]   • 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  Choledocholithiasis with obstruction and acute cholangitis.  2.  Xarelto anticoagulation therapy for history of deep vein thrombosis.  3.  Abnormal biliary duct MRCP imaging.  4.  History of prior Billroth II procedure for prior peptic ulcer disease  5.  Epigastric abdominal pain radiating to the left upper quadrant.  6.  History of peptic ulcer disease.  7.  Multiple comorbidities as per above.    Plan:  1. Continue antibiotics  2. Monitor liver tests  3. May need to consider attempt at ERCP with pediatric colonoscope (but this is likely challenging and may have lower likelihood of success due to inability to get needed instruments to desired area) versus IR attempts at removal via existing drain once clinical status improves      Reviewed items::  Radiology images reviewed, Labs reviewed and Medications reviewed

## 2017-09-09 NOTE — PROGRESS NOTES
[facility] Hospitalist Progress Note    Date of Service: 2017    Chief Complaint  83 y.o. male admitted 2017 with syncope and RUQ pain.    Interval Problem Update  Pt went for biliary drain placement, noted significant AMS and respiratory distress post.  Pt noted to have choledocholithiasis and cholangitis causing sepsis.  Has hx of gastrojejunostomy which prevents ERCP from being done.  Discussed with Dr Quan.  Discussed with Dr Thomas.  Pt seen and examined in ICU, ICU care given.  Discussed patient condition and plan with RN, RT and charge nurse / hot rounds.    Consultants/Specialty  Critical care - Dr Quan  GI - Dr Thomas    Disposition  Pt requires additional treatment in the hospital        Review of Systems   Constitutional: Positive for malaise/fatigue. Negative for chills and fever.   HENT: Negative for congestion.    Respiratory: Negative for cough, sputum production, shortness of breath and stridor.    Cardiovascular: Negative for chest pain, palpitations and leg swelling.   Gastrointestinal: Positive for abdominal pain. Negative for constipation, diarrhea, nausea and vomiting.   Genitourinary: Negative for dysuria and urgency.   Musculoskeletal: Negative for falls and myalgias.   Neurological: Positive for weakness. Negative for dizziness, tingling, loss of consciousness and headaches.   Psychiatric/Behavioral: Negative for depression and suicidal ideas.      Physical Exam  Laboratory/Imaging   Hemodynamics  Temp (24hrs), Av.7 °C (98.1 °F), Min:36.2 °C (97.2 °F), Max:37.4 °C (99.3 °F)   Temperature: 36.5 °C (97.7 °F)  Pulse  Av.7  Min: 54  Max: 88 Heart Rate (Monitored): 62  NIBP: 135/61      Respiratory      Respiration: 17, Pulse Oximetry: 98 %, O2 Daily Delivery Respiratory : Highflow Nasal Cannula     Work Of Breathing / Effort: Mild  RUL Breath Sounds: Clear, RML Breath Sounds: Clear, RLL Breath Sounds: Diminished, VENKAT Breath Sounds: Clear, LLL Breath Sounds:  Diminished    Fluids    Intake/Output Summary (Last 24 hours) at 09/09/17 1003  Last data filed at 09/09/17 1000   Gross per 24 hour   Intake             3540 ml   Output             1260 ml   Net             2280 ml       Nutrition  Orders Placed This Encounter   Procedures   • DIET ORDER     Standing Status:   Standing     Number of Occurrences:   1     Order Specific Question:   Diet:     Answer:   Regular [1]     Physical Exam   Constitutional:  Non-toxic appearance. No distress.   HENT:   Head: Normocephalic and atraumatic.   Mouth/Throat: No oropharyngeal exudate.   Eyes: Right eye exhibits no discharge. Left eye exhibits no discharge. No scleral icterus.   Neck: Neck supple. No edema and no erythema present.   Cardiovascular: Normal rate and regular rhythm.    No murmur heard.  Pulmonary/Chest: Accessory muscle usage present. No stridor. He is in respiratory distress. He has rales (scattered ).   Abdominal: Soft. Bowel sounds are normal.   Musculoskeletal: He exhibits no edema.   Lymphadenopathy:     He has no cervical adenopathy.   Neurological:   Somnolent and confused    Skin: Skin is warm and dry. He is not diaphoretic. No erythema.   Psychiatric: He has a normal mood and affect. His behavior is normal.   Nursing note and vitals reviewed.      Recent Labs      09/08/17   0540  09/08/17   1738  09/09/17   0451   WBC  16.1*  22.0*  19.4*   RBC  3.21*  3.39*  3.17*   HEMOGLOBIN  10.2*  10.8*  10.0*   HEMATOCRIT  31.4*  33.3*  31.7*   MCV  97.8  98.2*  100.0*   MCH  31.8  31.9  31.5   MCHC  32.5*  32.4*  31.5*   RDW  49.8  52.4*  55.1*   PLATELETCT  126*  104*  98*   MPV  13.4*  13.8*  14.1*     Recent Labs      09/07/17   1710  09/08/17   0540  09/09/17   0730   SODIUM  136  134*  137   POTASSIUM  4.2  4.9  4.1   CHLORIDE  107  107  113*   CO2  18*  17*  15*   GLUCOSE  328*  190*  135*   BUN  31*  28*  34*   CREATININE  1.44*  1.40  1.74*   CALCIUM  8.6  7.6*  6.4*     Recent Labs      09/07/17   0815   09/09/17   0451   APTT  17.9*  32.7   INR  0.95   --      Recent Labs      09/07/17   1710   BNPBTYPENAT  203*              Assessment/Plan     * Cholangitis   Assessment & Plan    - continue zosyn  - d/t choledocholithiasis  - now s/p drain placement  - discussed with Dr Thomas, prior surgery makes regular ERCP unavailable  - may be able to use smaller scope or lithotripsy per GI        Metabolic acidemia   Assessment & Plan    - improved, d/t sepsis        Sepsis (CMS-HCC)- (present on admission)   Assessment & Plan    This is severe sepsis with the following associated acute organ dysfunction(s): hepatic failure.   - d/t cholangitis from choledocholithiasis  - continue zosyn  - await culture results  - stone extraction if able  - recommendations per GI         DVT, recurrent, lower extremity, chronic (CMS-HCC)   Assessment & Plan    - xarelto when cleared by GI         CKD (chronic kidney disease)- (present on admission)   Assessment & Plan    - improved from baseline  - repeat bmp in am        Hypothyroidism- (present on admission)   Assessment & Plan    - continue synthroid         Diabetes mellitus (CMS-HCC)- (present on admission)   Assessment & Plan    - continue ISS, adjust as needed        Altered mental status   Assessment & Plan    -Resolved        Acute respiratory failure (CMS-HCC)   Assessment & Plan    - post drain placement  - initially felt it was d/t meds but cxr obtained which looks wet  - now improved but still requiring significant oxygen        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

## 2017-09-09 NOTE — PROGRESS NOTES
Lab called with critical result of 6.4 at 0830. Critical lab result read back to lab.   Dr. Quan notified of critical lab result at 0845.  Critical lab result read back by Dr. Spence.

## 2017-09-09 NOTE — PROGRESS NOTES
Dr. Quan notified of lactic acid, cbc results as well as urine output and bladder scan results this shift. MD aware of lab results, no actions need to be taken for labs. Bladder scan patient this evening, may straight cath if volume is greater than 500 ml.

## 2017-09-09 NOTE — CONSULTS
DATE OF SERVICE:  09/08/2017    REQUESTING PHYSICIAN:  Harvinder Gallegos DO.    REASON FOR CONSULTATION:  Hypoxemia, respiratory failure.    HISTORY OF PRESENT ILLNESS:  The patient is an elderly male, 83 years old, who   presented yesterday after a witnessed syncopal spell.  He complained of   abdominal pain.  He had a workup at an outside hospital before being   transferred here to suggest abnormal liver function studies and he was sent   here for MRCP and GI evaluation.  Common bile duct stone was identified and   cholangitis was suspected.  He was started on antibiotics for findings of   sepsis.  Seen in consultation by GI and because of prior gastrojejunostomy,   the patient could not have an ERCP.  He was taken to the IR suite today by   radiology, sedated with 1 mg of Versed and 25 mcg of fentanyl.  He was on 2   liter nasal cannula going down, but then developed hypoxemia after an   uncomplicated 15 minute procedure where they successfully placed a drain into   dilated biliary tree and send fluid for culture.  He became hypoxemic and   nursing staff from the ICU contacted the ICU team and respiratory therapy   evaluated him and turned his O2 up to 100% nonrebreather in the IR suite and   brought him back to the ICU.  Chest x-ray was immediately performed and it   revealed significantly worsening bibasilar atelectasis and pulmonary edema   pattern that was a bit worse on the right than the left, cardiomegaly and no   pneumothorax.  Evidence of multiple prior abdominal surgeries, multiple clips   and there was presence of the biliary drain in the lower portion of this chest   radiograph.  The patient was subsequently converted to high flow cannula 70   liters at 100% to get sats over 90%.  He was breathing in the 30s and mildly   labored.  Code status was previously addressed and he was a DNR and that was   reconfirmed with him and his wife.  He was dyspneic, but able to communicate   when he arrived to the ICU.   He denied chest pain, nausea, vomiting.    PAST MEDICAL HISTORY:  Significant for diabetes, peptic ulcer disease, prior   cancer, which I do not have details of, history of DVT on Xarelto, chronic   kidney disease, hypothyroidism, history of choledocholithiasis.    ALLERGIES:  TALWIN.    CURRENT MEDICATIONS:  Include sliding scale insulin, Synthroid, piperacillin   tazobactam, bowel care protocols, receiving saline infusion, has a variety of   p.r.n. medications.  Med rec form was reviewed and included rivaroxaban,   cholecalciferol, Levemir insulin and levothyroxine.    PAST SURGICAL HISTORY:  Included abdominal exploration, prior cholecystectomy,   prior partial gastrectomy, history of radical prostatectomy and history of   thyroidectomy.    FAMILY HISTORY:  Noncontributory at this point.  Wife stated no major   illnesses.    SOCIAL HISTORY:  Patient never smoker, no history of significant alcohol use.    No history of any substance abuse.  Retired, lives here in the [facility] with his wife.    REVIEW OF SYSTEMS:  Difficult to obtain, the patient is dyspneic.  We will   review when possible.  Case discussed at length with Dr. Gallegos, respiratory   therapy supervisor who saw the patient in IR, bedside nurse, ICU team,   patient's wife and the patient.  EHR was reviewed at length as well.    PHYSICAL EXAMINATION:  VITAL SIGNS:  The patient has been afebrile since admit.  T-max 99.2, heart   rate was around 100, respiratory rate in the 30s almost 40, sats 92% on 100%   high flow cannula at 70 liters, blood pressure running around 110/55 mildly   sleepy when he first got back from sedation, but he is able to communicate.    He is mildly labored with his breathing, sitting up at about 70-80 degree   angle.  He appears his stated age.  He appears frail and elderly.  HEENT:  Cranial nerves are intact.  Oropharynx clear.  Mucous membranes moist.  NECK:  Supple, no JVD while sitting up tall.  Trach is  midline.  CHEST:  Reveals bibasilar diminished breath sounds and bibasilar coarse rales,   anteriorly is clear.  No wheezes.  There is no crepitus in his neck or chest.    Ultrasound chest at that time revealed no lung sliding.  Chest x-ray   confirmed no pneumothorax after that.  Right lateral midaxillary line   hepatobiliary drain noted.  Site looks good.  No subcutaneous air there.  No   leakage or drainage, grossly nontender.  CARDIAC:  Exam revealed tachycardic rate, regular rhythm.  Distant heart   tones.  No gallops.  ABDOMEN:  Slightly protuberant, but soft.  No guarding, no rebound or   rigidity.  Bowel sounds are a bit diminished.  BACK:  Nontender spine, no CVAT.  EXTREMITIES:  No cyanosis, clubbing or edema.  Good capillary refill.  Strong   pulses.  NEUROLOGIC:  He moves all 4 extremities to command.  Cranial nerves are   intact.  He was a bit slow, but appropriate in all responses.    Chest x-ray as outlined above.  No pneumothorax, probable pulmonary edema,   definite atelectasis, mild cardiomegaly.    LABORATORY DATA::  White count 16.1, hematocrit 31.4, platelet count 126 those   are all this a.m. platelet count was low in the past in 124 about 9 months   ago, 108 yesterday as the low differential noted.  Chemistries earlier at the   same time this a.m., sodium 134, potassium 4.9, chloride 109, bicarbonate 17,   anion gap 10, glucose 190, BUN 28, creatinine 1.4, calcium 7.6, , ALT   372, alkaline phosphatase 164, bilirubin 3.5.  They are all improved except   for the bilirubin has gone from 3, lactic acid was 2.4 yesterday went up to   2.9 last night, 2.3 this morning and then when he got back for his procedure   it is back up again at 3.4, glucose trending between 98 and 285 most recent   145.  TEG noted grossly normal including platelet mapping.  Blood culture x2   yesterday positive for gram-negative rods, presumably E. coli, this is from   [facility].  Sensitivities,  specificities and final speciation   pending.    I reviewed the case with interventional radiologist, Dr. Huerta.  Case was   short uncomplicated and limited, today he developed hypoxemia post-procedure,   did not appear to have any respiratory issues throughout the case.  He was   laid flat on his back for 15 minutes only.      MRI Abdomen reviewed revealing moderate intrahepatic and extrahepatic biliary   dilation, distal common bile duct stone 10x5 mm, portal venous thrombosis   suspected as well.  CT head yesterday outside facility normal.  CT abdomen   yesterday revealed a prostatectomy, splenectomy, DJD, nonobstructing renal   calculi along with hepatic findings and prior cholecystectomy and gastric   surgery.    CT L-spine revealed degenerative changes referred to the body of the report.    ASSESSMENT:  1.  Sepsis syndrome secondary to cholangitis and obstructing stone.  2.  Status post syncopal spell presumably secondary to above.  3.  Acute hypoxemic respiratory failure, placement of hepatobiliary drain and   decompression of obstruction, presumably secondary to atelectasis and   pulmonary edema.  4.  Lifelong nonsmoker.  5.  History of multiple abdominal surgeries including gastrojejunostomy,   cholecystectomy complex anatomy, not a candidate for ERCP.  6.  History of deep venous thrombosis and prior Xarelto use, stopped   yesterday.  7.  Anemia and thrombocytopenia, chronic, mild.  8.  Hyponatremia secondary to above.  9.  History of diabetes with uncontrolled blood sugars until today.  10.  Elevated liver function studies and alkaline phosphatase secondary to   cholangitis.  11.  Bacteremia with E. coli presumably biliary source.  12.  Hypothyroidism, on replacement, history of thyroidectomy, history of   radical prostatectomy for cancer, I believe.  13.  History of peptic ulcer disease.    RECOMMENDATIONS AND CLINICAL DECISION MAKING:  Patient is critically ill.  His   prognosis is guarded.  I do notice  code status.  He is willing to do BiPAP.    Fortunately, he has improved with positioning, aggressive pulmonary toilet by   respiratory therapist and high flow nasal cannula O2.  Given his borderline   blood pressures, I will not diurese him at this time.  We will monitor for   need.  Get an echocardiogram out of completeness.  IV antibiotics are ongoing   and maybe I will deescalate the dose tomorrow when sensitivities come back.    He clinically seems to be responding to the Zosyn so presumably his E. Coli is   not an ESBL and will not require carbapenem.  We decompressed his biliary   tree now.  When he is clinically stable, IR go back and try to remove or   advance his common bile duct stone.  Reviewed that with Dr. Huerta.  Insulin   sliding scale ongoing.  Monitor for need for an insulin drip I150 protocol.    Low threshold for that.  Patient has central access monitor CVP as well as   ultrasound IVC and manage fluids objectively as possible.  IPV or PEP will be   employed by respiratory therapy as needed if he does not do well with coughing   and IS.  The pain seems to be controlled at this time, we will be cautious   with anxiolysis and narcotic use.  Serial labs will be obtained and correct   metabolic abnormalities and transfuse per protocol.  Patient is critically   ill.  Prognosis is very much guarded.  I spent in excess of 55 minutes caring   for this patient.  Case discussed with the patient and his wife, primary   physician Dr. Gallegos, IR physician Dr. Huerta, nursing staff, respiratory   therapy staff, and charge nurse in the ICU and pharmacist.    Thank you for asking me to see him.  I will follow along closely in the ICU.       ____________________________________     MD YENI LUCIA / DOUG    DD:  09/08/2017 17:35:39  DT:  09/08/2017 19:32:16    D#:  [id number]  Job#:  [id number]

## 2017-09-10 ENCOUNTER — APPOINTMENT (OUTPATIENT)
Dept: RADIOLOGY | Facility: MEDICAL CENTER | Age: 82
DRG: 853 | End: 2017-09-10
Attending: INTERNAL MEDICINE
Payer: MEDICARE

## 2017-09-10 LAB
ANION GAP SERPL CALC-SCNC: 9 MMOL/L (ref 0–11.9)
BUN SERPL-MCNC: 27 MG/DL (ref 8–22)
CALCIUM SERPL-MCNC: 6.6 MG/DL (ref 8.5–10.5)
CHLORIDE SERPL-SCNC: 112 MMOL/L (ref 96–112)
CO2 SERPL-SCNC: 16 MMOL/L (ref 20–33)
CREAT SERPL-MCNC: 1.4 MG/DL (ref 0.5–1.4)
ERYTHROCYTE [DISTWIDTH] IN BLOOD BY AUTOMATED COUNT: 52.2 FL (ref 35.9–50)
GFR SERPL CREATININE-BSD FRML MDRD: 48 ML/MIN/1.73 M 2
GLUCOSE BLD-MCNC: 132 MG/DL (ref 65–99)
GLUCOSE BLD-MCNC: 133 MG/DL (ref 65–99)
GLUCOSE BLD-MCNC: 168 MG/DL (ref 65–99)
GLUCOSE BLD-MCNC: 177 MG/DL (ref 65–99)
GLUCOSE BLD-MCNC: 231 MG/DL (ref 65–99)
GLUCOSE SERPL-MCNC: 147 MG/DL (ref 65–99)
HCT VFR BLD AUTO: 29.5 % (ref 42–52)
HGB BLD-MCNC: 9.6 G/DL (ref 14–18)
MCH RBC QN AUTO: 32 PG (ref 27–33)
MCHC RBC AUTO-ENTMCNC: 32.5 G/DL (ref 33.7–35.3)
MCV RBC AUTO: 98.3 FL (ref 81.4–97.8)
PLATELET # BLD AUTO: 94 K/UL (ref 164–446)
PMV BLD AUTO: 13.9 FL (ref 9–12.9)
POTASSIUM SERPL-SCNC: 3.8 MMOL/L (ref 3.6–5.5)
RBC # BLD AUTO: 3 M/UL (ref 4.7–6.1)
SODIUM SERPL-SCNC: 137 MMOL/L (ref 135–145)
WBC # BLD AUTO: 15.6 K/UL (ref 4.8–10.8)

## 2017-09-10 PROCEDURE — 700102 HCHG RX REV CODE 250 W/ 637 OVERRIDE(OP): Performed by: INTERNAL MEDICINE

## 2017-09-10 PROCEDURE — 700102 HCHG RX REV CODE 250 W/ 637 OVERRIDE(OP): Performed by: HOSPITALIST

## 2017-09-10 PROCEDURE — 700111 HCHG RX REV CODE 636 W/ 250 OVERRIDE (IP): Performed by: HOSPITALIST

## 2017-09-10 PROCEDURE — 82962 GLUCOSE BLOOD TEST: CPT

## 2017-09-10 PROCEDURE — A9270 NON-COVERED ITEM OR SERVICE: HCPCS | Performed by: INTERNAL MEDICINE

## 2017-09-10 PROCEDURE — 302043 TAPE, HYPAFIX: Performed by: RADIOLOGY

## 2017-09-10 PROCEDURE — 700105 HCHG RX REV CODE 258: Performed by: INTERNAL MEDICINE

## 2017-09-10 PROCEDURE — 700111 HCHG RX REV CODE 636 W/ 250 OVERRIDE (IP): Performed by: INTERNAL MEDICINE

## 2017-09-10 PROCEDURE — A9270 NON-COVERED ITEM OR SERVICE: HCPCS | Performed by: HOSPITALIST

## 2017-09-10 PROCEDURE — 71010 DX-CHEST-PORTABLE (1 VIEW): CPT

## 2017-09-10 PROCEDURE — 770001 HCHG ROOM/CARE - MED/SURG/GYN PRIV*

## 2017-09-10 PROCEDURE — 80048 BASIC METABOLIC PNL TOTAL CA: CPT

## 2017-09-10 PROCEDURE — 99233 SBSQ HOSP IP/OBS HIGH 50: CPT | Performed by: INTERNAL MEDICINE

## 2017-09-10 PROCEDURE — 85027 COMPLETE CBC AUTOMATED: CPT

## 2017-09-10 RX ORDER — FUROSEMIDE 10 MG/ML
20 INJECTION INTRAMUSCULAR; INTRAVENOUS ONCE
Status: COMPLETED | OUTPATIENT
Start: 2017-09-10 | End: 2017-09-10

## 2017-09-10 RX ORDER — POTASSIUM CHLORIDE 20 MEQ/1
20 TABLET, EXTENDED RELEASE ORAL DAILY
Status: COMPLETED | OUTPATIENT
Start: 2017-09-10 | End: 2017-09-11

## 2017-09-10 RX ADMIN — PIPERACILLIN AND TAZOBACTAM 2.25 G: 2; .25 INJECTION, POWDER, LYOPHILIZED, FOR SOLUTION INTRAVENOUS; PARENTERAL at 00:11

## 2017-09-10 RX ADMIN — POTASSIUM CHLORIDE 20 MEQ: 1500 TABLET, EXTENDED RELEASE ORAL at 10:28

## 2017-09-10 RX ADMIN — ONDANSETRON 4 MG: 2 INJECTION INTRAMUSCULAR; INTRAVENOUS at 12:22

## 2017-09-10 RX ADMIN — CALCIUM GLUCONATE 1 G: 94 INJECTION, SOLUTION INTRAVENOUS at 15:25

## 2017-09-10 RX ADMIN — OXYCODONE HYDROCHLORIDE 5 MG: 5 TABLET ORAL at 08:17

## 2017-09-10 RX ADMIN — OXYCODONE HYDROCHLORIDE 5 MG: 5 TABLET ORAL at 20:59

## 2017-09-10 RX ADMIN — INSULIN LISPRO 3 UNITS: 100 INJECTION, SOLUTION INTRAVENOUS; SUBCUTANEOUS at 20:20

## 2017-09-10 RX ADMIN — CEFTRIAXONE 2 G: 2 INJECTION, POWDER, FOR SOLUTION INTRAMUSCULAR; INTRAVENOUS at 10:50

## 2017-09-10 RX ADMIN — PIPERACILLIN AND TAZOBACTAM 2.25 G: 2; .25 INJECTION, POWDER, LYOPHILIZED, FOR SOLUTION INTRAVENOUS; PARENTERAL at 05:51

## 2017-09-10 RX ADMIN — INSULIN LISPRO 4 UNITS: 100 INJECTION, SOLUTION INTRAVENOUS; SUBCUTANEOUS at 13:40

## 2017-09-10 RX ADMIN — FUROSEMIDE 20 MG: 10 INJECTION, SOLUTION INTRAMUSCULAR; INTRAVENOUS at 10:28

## 2017-09-10 RX ADMIN — OXYCODONE HYDROCHLORIDE 5 MG: 5 TABLET ORAL at 02:04

## 2017-09-10 RX ADMIN — LEVOTHYROXINE SODIUM 125 MCG: 125 TABLET ORAL at 05:52

## 2017-09-10 RX ADMIN — INSULIN LISPRO 3 UNITS: 100 INJECTION, SOLUTION INTRAVENOUS; SUBCUTANEOUS at 18:46

## 2017-09-10 ASSESSMENT — ENCOUNTER SYMPTOMS
VOMITING: 0
FEVER: 0
SPUTUM PRODUCTION: 0
DEPRESSION: 0
STRIDOR: 0
CONSTIPATION: 0
FALLS: 0
LOSS OF CONSCIOUSNESS: 0
DIZZINESS: 0
NAUSEA: 1
ABDOMINAL PAIN: 1
PALPITATIONS: 0
DIARRHEA: 0
ABDOMINAL PAIN: 0
WEAKNESS: 1
SHORTNESS OF BREATH: 1
CHILLS: 0
NAUSEA: 0
SHORTNESS OF BREATH: 0
MYALGIAS: 0

## 2017-09-10 ASSESSMENT — PAIN SCALES - GENERAL
PAINLEVEL_OUTOF10: 1
PAINLEVEL_OUTOF10: 1
PAINLEVEL_OUTOF10: 0
PAINLEVEL_OUTOF10: 4
PAINLEVEL_OUTOF10: 5
PAINLEVEL_OUTOF10: 1
PAINLEVEL_OUTOF10: 1
PAINLEVEL_OUTOF10: 5
PAINLEVEL_OUTOF10: 1
PAINLEVEL_OUTOF10: 1
PAINLEVEL_OUTOF10: 2
PAINLEVEL_OUTOF10: 0

## 2017-09-10 NOTE — WOUND TEAM
Wound consult placed for evaluation of left first met head callous.  Note thick callous with blackened area extending proximally ~1cm x 0.2cm.  Area is dry and intact and choose no debridement at this time.  No sign of infection and patient reports this is chronic for him.  Instructed him to contact PCP after discharge and arrange referral to podiatrist for debridement and referral for orthotics and insoles which Medicare should cover due to DM status.  No wound care indicated.  Skin tears on right arm intact with appropriate dressing.  No involvement by wound team at this time.

## 2017-09-10 NOTE — PROGRESS NOTES
Pulmonary Critical Care Progress Note        DOS:  9/10/2017    Chief Complaint: SOB    History of Present Illness: 83 y.o. male admitted for sepsis, CBD stone identified, cholangits diagnoses.  Transhepatic drain place in IR 9/8 and post procedure O2 requirements went from 2 lpm to a NRBM -> HF NC O2.  CXR revealed atelectasis and pulmonary edema.      ROS:    Respiratory: negative cough, negative hemoptysis, negative pleuritic chest pain, positive shortness of breath, negative sputum production and negative wheezing,   Cardiac: negative chest pain, negative palpitations, positive orthopnea, negative leg swelling and positive PND,   GI: positive nausea, negative vomiting, positive abdominal pain and negative diarrhea.     Interval Events:  24 hour interval history reviewed         Feels better - A&O x 4  Mobilizing  O2 5 lpm  -1000  CXR worse edema/effusion/atx  Tm 98.2  Hemodynamics -SB/SR  No N/V/P - not eating well  Drain 350  Cultures pending still from [facility]?  Bile with neg grams stain                            Alert and following well  HF NC 35-40 lpm/40%  Hemodynamics ok - no low Bp  E coli in blood x 2 - sens pending  Transhepatic drain - 210cc -> no culures sent per RN  Afebrile - WBC better  CXR better edema/ATX  Creat trending up, LFTs trending down    PFSH:  No change.    Respiratory:     Pulse Oximetry: 94 %  HF NC O2  WOB elevated - better vs yesterday          Exam: labored breathing, rales bibasilar and diminished breath sounds mild  ImagingAvailable data reviewed         Invalid input(s): [id number]    HemoDynamics:  Pulse: 63, Heart Rate (Monitored): 63  NIBP: 138/73       Exam: regular rhythm (Sinus), no ectopy, edema, ok pulses and cap refill  Imaging: Available data reviewed  Recent Labs      09/07/17   0815  09/07/17   1710   TROPONINI  <0.02  <0.01   BNPBTYPENAT   --   203*       Neuro:  GCS Total Sharon Coma Score: 15       Exam: no focal deficits noted mental status intact,  slow  Imaging: Available data reviewed    Fluids:  Intake/Output       09/08/17 0700 - 09/09/17 0659 09/09/17 0700 - 09/10/17 0659 09/10/17 0700 - 09/11/17 0659      9026-9607 6111-9904 Total 4576-7535 8800-4957 Total 8833-5512 0847-4825 Total       Intake    P.O.  --  360 360  380  270 650  --  -- --    P.O. -- 360 360 380 270 650 -- -- --    I.V.  1610  1450 3060  1850  1350 3200  --  -- --    Magnesium Sulfate Volume -- -- -- 50 -- 50 -- -- --    IV Piggyback Volume (IV Piggyback) 100 200 300 300 100 400 -- -- --    IV Volume (NS) 1500 1250 2750 1500 1250 2750 -- -- --    IV Volume (TKO) 10 -- 10 -- -- -- -- -- --    Enteral  20  -- 20  --  -- --  --  -- --    Free Water / Tube Flush 20 -- 20 -- -- -- -- -- --    Total Intake 1630 1810 3440 2230 1620 3850 -- -- --       Output    Urine  200  440 640  700  310 1010  --  -- --    Number of Times Voided 1 x 2 x 3 x 2 x 1 x 3 x -- -- --    Void (ml) 200 440 640  -- -- --    Drains  110  210 320  260  -- 260  --  -- --    Other 1 110 210 320 260 -- 260 -- -- --    Stool  --  -- --  --  -- --  --  -- --    Number of Times Stooled -- 0 x 0 x 0 x 0 x 0 x -- -- --    Total Output 310 650 960  -- -- --       Net I/O     1320 1160 2480 1270 1310 2580 -- -- --           Recent Labs      09/07/17   0815   09/08/17   0540  09/09/17   0730  09/10/17   0415   SODIUM  139   < >  134*  137  137   POTASSIUM  4.7   < >  4.9  4.1  3.8   CHLORIDE  107   < >  107  113*  112   CO2  21   < >  17*  15*  16*   BUN  33*   < >  28*  34*  27*   CREATININE  1.6*   < >  1.40  1.74*  1.40   MAGNESIUM  1.8   --    --   1.5   --    PHOSPHORUS   --    --    --   3.3   --    CALCIUM  8.9   < >  7.6*  6.4*  6.6*    < > = values in this interval not displayed.       GI/Nutrition:  Exam:  R lateral transhepatic biliary drain noted, BS positive, mild RUQ tenderness, ND, no CVAT  Imaging: Available data reviewed  taking PO  Liver Function  Recent Labs      09/07/17   0815   09/07/17   1710  09/08/17   0540  09/09/17   0730  09/10/17   0415   ALTSGPT  165*  497*  372*  176*   --    ASTSGOT  395*  946*  489*  142*   --    ALKPHOSPHAT  161*  199*  164*  91   --    TBILIRUBIN  1.3*  3.0*  3.5*  2.1*   --    LIPASE  62*   --    --    --    --    GLUCOSE  285*  328*  190*  135*  147*       Heme:  Recent Labs      09/07/17   0815   09/08/17   1738  09/09/17   0451  09/10/17   0415   RBC  3.50*   < >  3.39*  3.17*  3.00*   HEMOGLOBIN  11.2*   < >  10.8*  10.0*  9.6*   HEMATOCRIT  35.0*   < >  33.3*  31.7*  29.5*   PLATELETCT  126*   < >  104*  98*  94*   PROTHROMBTM  10.4   --    --    --    --    APTT  17.9*   --    --   32.7   --    INR  0.95   --    --    --    --     < > = values in this interval not displayed.       Infectious Disease:  Temp  Av.4 °C (97.5 °F)  Min: 36.1 °C (97 °F)  Max: 36.6 °C (97.9 °F)  Micro: reviewed      -  BC from [facility] positive for Escherichia coli  Biliary fluid cultures pending from     Recent Labs      09/07/17   1710  09/08/17   0540  09/08/17   1738  09/09/17   0451  09/09/17   0730  09/10/17   0415   WBC  19.1*  16.1*  22.0*  19.4*   --   15.6*   NEUTSPOLYS  90.20*  86.10*  89.50*  77.40*   --    --    LYMPHOCYTES  2.80*  6.50*  0.90*  5.20*   --    --    MONOCYTES  6.30  6.70  5.20  0.00   --    --    EOSINOPHILS  0.00  0.10  0.00  0.00   --    --    BASOPHILS  0.30  0.40  0.00  0.00   --    --    ASTSGOT  946*  489*   --    --   142*   --    ALTSGPT  497*  372*   --    --   176*   --    ALKPHOSPHAT  199*  164*   --    --   91   --    TBILIRUBIN  3.0*  3.5*   --    --   2.1*   --      Current Facility-Administered Medications   Medication Dose Frequency Provider Last Rate Last Dose   • oxycodone immediate-release (ROXICODONE) tablet 5 mg  5 mg Q4HRS PRN ANDREW MeyersONayeli   5 mg at 09/10/17 0204   • piperacillin-tazobactam (ZOSYN) 2.25 g in  mL IVPB  2.25 g Q6HRS Harvinder Quan M.D.   Stopped at 09/10/17 0041    • levothyroxine (SYNTHROID) tablet 125 mcg  125 mcg AM ES Darrel Cohn M.D.   125 mcg at 09/09/17 0612   • senna-docusate (PERICOLACE or SENOKOT S) 8.6-50 MG per tablet 2 Tab  2 Tab BID Darrel Cohn M.D.   Stopped at 09/07/17 2100    And   • polyethylene glycol/lytes (MIRALAX) PACKET 1 Packet  1 Packet QDAY PRN Darrel Cohn M.D.        And   • magnesium hydroxide (MILK OF MAGNESIA) suspension 30 mL  30 mL QDAY PRN Darrel Cohn M.D.        And   • bisacodyl (DULCOLAX) suppository 10 mg  10 mg QDAY PRN Darrel Cohn M.D.       • Respiratory Care per Protocol   Continuous RT Darrel Cohn M.D.       • NS infusion   Continuous Darrel Cohn M.D. 125 mL/hr at 09/08/17 2042     • acetaminophen (TYLENOL) tablet 650 mg  650 mg Q6HRS PRN Darrel Cohn M.D.       • insulin lispro (HUMALOG) injection 3-14 Units  3-14 Units Q6HRS Darrel Cohn M.D.   Stopped at 09/10/17 0000   • ondansetron (ZOFRAN) syringe/vial injection 4 mg  4 mg Q4HRS PRN Darrel Cohn M.D.   4 mg at 09/08/17 0724   • ondansetron (ZOFRAN ODT) dispertab 4 mg  4 mg Q4HRS PRN Darrel Cohn M.D.       • glucose 4 g chewable tablet 16 g  16 g Q15 MIN PRN Darrel Cohn M.D.        And   • dextrose 50% (D50W) injection 25 mL  25 mL Q15 MIN PRN Darrel Cohn M.D.         Last reviewed on 9/7/2017  5:03 PM by Sandee Mosquera    Quality  Measures:  Core Measures & Quality Metrics    Problems/plan:  Sepsis syndrome secondary to cholangitis and obstructing stone 5x10 mm - CBD.   Sepsis protocols   IV ABX   Perc biliary drain   Remove stone when improved by IR - early next week?   Fluids/pressors PRN   GI evaluating  Bacteremia with E. coli presumably biliary source.   Repeat BC   Biliary fluid Gram stain negative, culture pending  Status post syncopal spell presumably secondary to above.   further eval if recurrent  Acute hypoxemic respiratory failure, placement of hepatobiliary drain and decompression of  obstruction, presumably secondary to atelectasis and pulmonary edema.  Improved   RT protocols   Improved with time and pulmonary toilet   Forced diuresis   Wean HF NC O2 to regular nasal cannula   Serial CXR   IS/PEP - IPV PRN  Lifelong nonsmoker.  History of multiple abdominal surgeries including gastrojejunostomy, cholecystectomy complex anatomy, not a candidate for ERCP.   GI evaluating  History of deep venous thrombosis and prior Xarelto use, stopped yesterday.   Sequentials   Lovenox   Resume novel agent when/if clinically appropriate  Anemia and thrombocytopenia, chronic, mild.   Transfusion protocols  Hyponatremia secondary to above.  History of diabetes with uncontrolled blood sugars - SSI/longacting as needed  Elevated liver function studies and alkaline phosphatase secondary to cholangitis.   Trend CMP etc  Hypothyroidism, on replacement, history of thyroidectomy, replete  History of radical prostatectomy for cancer  History of peptic ulcer disease.  Therapies  Enteral nutrition if ok with GI  Prophylaxis  DNR    Continue to hold Xarelto   Check NIVT lower extremities, rule out DVT, if negative consider stopping Xarelto  Calcium gluconate 1 g IV  Lasix x one dose IV  Heplock IVF  Change ceftriaxone 2 g every 24 hours  May be ready for transfer out of ICU later today    Discussed patient condition and risk of morbidity and/or mortality with Family, RN, RT, Pharmacy, Charge nurse / hot rounds and hospitalist.

## 2017-09-10 NOTE — PROGRESS NOTES
[facility] Hospitalist Progress Note    Date of Service: 9/10/2017    Chief Complaint  83 y.o. male admitted 2017 with syncope and RUQ pain.    Interval Problem Update  Pt went for biliary drain placement, noted significant AMS and respiratory distress post.  Pt noted to have choledocholithiasis and cholangitis causing sepsis.  Has hx of gastrojejunostomy which prevents ERCP from being done.  Pt seen and examined in ICU, ICU care given.  Discussed patient condition and plan with RN, RT and charge nurse / hot rounds.    Consultants/Specialty  Critical care - Dr Quan  GI - Dr Thomas    Disposition  Pt requires additional treatment in the hospital        Review of Systems   Constitutional: Positive for malaise/fatigue. Negative for chills.   HENT: Negative for congestion.    Respiratory: Negative for sputum production, shortness of breath and stridor.    Cardiovascular: Negative for chest pain and palpitations.   Gastrointestinal: Positive for abdominal pain. Negative for nausea and vomiting.   Genitourinary: Negative for dysuria, frequency and urgency.   Musculoskeletal: Negative for falls and myalgias.   Neurological: Positive for weakness. Negative for dizziness and loss of consciousness.   Psychiatric/Behavioral: Negative for depression and suicidal ideas.      Physical Exam  Laboratory/Imaging   Hemodynamics  Temp (24hrs), Av.4 °C (97.6 °F), Min:36.1 °C (97 °F), Max:36.8 °C (98.2 °F)   Temperature: 36.8 °C (98.2 °F)  Pulse  Av.1  Min: 54  Max: 88 Heart Rate (Monitored): 63  NIBP: 145/70      Respiratory      Respiration: 12, Pulse Oximetry: 96 %, O2 Daily Delivery Respiratory : Silicone Nasal Cannula        RUL Breath Sounds: Clear, RML Breath Sounds: Clear, RLL Breath Sounds: Diminished, VENKAT Breath Sounds: Clear, LLL Breath Sounds: Diminished    Fluids    Intake/Output Summary (Last 24 hours) at 09/10/17 1009  Last data filed at 09/10/17 0800   Gross per 24 hour   Intake             3660 ml    Output             1460 ml   Net             2200 ml       Nutrition  Orders Placed This Encounter   Procedures   • DIET ORDER     Standing Status:   Standing     Number of Occurrences:   1     Order Specific Question:   Diet:     Answer:   Regular [1]     Physical Exam   Constitutional: He is oriented to person, place, and time. He appears well-developed.  Non-toxic appearance. No distress.   HENT:   Head: Normocephalic and atraumatic.   Eyes: Right eye exhibits no discharge. Left eye exhibits no discharge.   Neck: Neck supple. No edema and no erythema present.   Cardiovascular: Normal rate and regular rhythm.  Exam reveals no friction rub.    No murmur heard.  Pulmonary/Chest: Accessory muscle usage present. No stridor. He is in respiratory distress. He has no wheezes. He has rales (scattered ).   Abdominal: Soft. Bowel sounds are normal. He exhibits no distension.   Musculoskeletal: He exhibits no edema or tenderness.   Neurological: He is alert and oriented to person, place, and time.   Skin: Skin is warm and dry. He is not diaphoretic.   Psychiatric: He has a normal mood and affect. His behavior is normal. Judgment and thought content normal.   Nursing note and vitals reviewed.      Recent Labs      09/08/17   1738  09/09/17   0451  09/10/17   0415   WBC  22.0*  19.4*  15.6*   RBC  3.39*  3.17*  3.00*   HEMOGLOBIN  10.8*  10.0*  9.6*   HEMATOCRIT  33.3*  31.7*  29.5*   MCV  98.2*  100.0*  98.3*   MCH  31.9  31.5  32.0   MCHC  32.4*  31.5*  32.5*   RDW  52.4*  55.1*  52.2*   PLATELETCT  104*  98*  94*   MPV  13.8*  14.1*  13.9*     Recent Labs      09/08/17   0540  09/09/17   0730  09/10/17   0415   SODIUM  134*  137  137   POTASSIUM  4.9  4.1  3.8   CHLORIDE  107  113*  112   CO2  17*  15*  16*   GLUCOSE  190*  135*  147*   BUN  28*  34*  27*   CREATININE  1.40  1.74*  1.40   CALCIUM  7.6*  6.4*  6.6*     Recent Labs      09/09/17   0451   APTT  32.7     Recent Labs      09/07/17   1710   BNPBTYPENAT  203*               Assessment/Plan     * Cholangitis   Assessment & Plan    - continue zosyn  - d/t choledocholithiasis  - now s/p drain placement  - discussed with Dr Thomas, prior surgery makes regular ERCP unavailable  - may be able to use smaller scope or lithotripsy per GI or IR procedure         Metabolic acidemia   Assessment & Plan    - improved, d/t sepsis        Sepsis (CMS-HCC)- (present on admission)   Assessment & Plan    This is severe sepsis with the following associated acute organ dysfunction(s): hepatic failure.   - d/t cholangitis from choledocholithiasis  - continue zosyn  - await culture results  - stone extraction if able  - recommendations per GI         DVT, recurrent, lower extremity, chronic (CMS-HCC)   Assessment & Plan    - xarelto when cleared by GI         CKD (chronic kidney disease)- (present on admission)   Assessment & Plan    - improved from baseline  - repeat bmp in am        Hypothyroidism- (present on admission)   Assessment & Plan    - continue synthroid         Diabetes mellitus (CMS-HCC)- (present on admission)   Assessment & Plan    - continue ISS, adjust as needed        Altered mental status   Assessment & Plan    -Resolved        Acute respiratory failure (CMS-HCC)   Assessment & Plan    - post drain placement  - initially felt it was d/t meds but cxr obtained which looks wet  - now improved, weaning O2  - one time lasix today        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

## 2017-09-10 NOTE — PROGRESS NOTES
Gastroenterology Progress Note     Author: Mohsen Thomas   Date & Time Created: 9/10/2017 6:58 AM    CC:  Cholangitis    Interval History:  Denies abdominal pain.  Oxygen levels improving.  Some nausea and poor appetite.    Review of Systems:  Review of Systems   Constitutional: Negative for chills and fever.   Respiratory: Positive for shortness of breath.    Cardiovascular: Negative for chest pain.   Gastrointestinal: Positive for nausea. Negative for abdominal pain, constipation, diarrhea and vomiting.       Physical Exam:  Physical Exam   Constitutional: He is oriented to person, place, and time. He appears well-developed and well-nourished.   Cardiovascular: Normal rate and regular rhythm.    Pulmonary/Chest: Effort normal and breath sounds normal.   Abdominal: Soft. Bowel sounds are normal. He exhibits no distension. There is tenderness in the right upper quadrant.   Drain in RUQ   Musculoskeletal: He exhibits no edema.   Neurological: He is alert and oriented to person, place, and time.   Nursing note and vitals reviewed.      Labs:        Invalid input(s): [id number]  Recent Labs      09/07/17 0815 09/07/17 1710   TROPONINI  <0.02  <0.01   BNPBTYPENAT   --   203*     Recent Labs      09/07/17 0815 09/08/17 0540  09/09/17   0730  09/10/17   0415   SODIUM  139   < >  134*  137  137   POTASSIUM  4.7   < >  4.9  4.1  3.8   CHLORIDE  107   < >  107  113*  112   CO2  21   < >  17*  15*  16*   BUN  33*   < >  28*  34*  27*   CREATININE  1.6*   < >  1.40  1.74*  1.40   MAGNESIUM  1.8   --    --   1.5   --    PHOSPHORUS   --    --    --   3.3   --    CALCIUM  8.9   < >  7.6*  6.4*  6.6*    < > = values in this interval not displayed.     Recent Labs      09/07/17 0815 09/07/17   1710 09/08/17 0540  09/09/17 0730  09/10/17   0415   ALTSGPT  165*  497*  372*  176*   --    ASTSGOT  395*  946*  489*  142*   --    ALKPHOSPHAT  161*  199*  164*  91   --    TBILIRUBIN  1.3*  3.0*  3.5*  2.1*    --    LIPASE  62*   --    --    --    --    GLUCOSE  285*  328*  190*  135*  147*     Recent Labs      17   0815   09/08/17   1738  09/09/17   0451  09/10/17   0415   RBC  3.50*   < >  3.39*  3.17*  3.00*   HEMOGLOBIN  11.2*   < >  10.8*  10.0*  9.6*   HEMATOCRIT  35.0*   < >  33.3*  31.7*  29.5*   PLATELETCT  126*   < >  104*  98*  94*   PROTHROMBTM  10.4   --    --    --    --    APTT  17.9*   --    --   32.7   --    INR  0.95   --    --    --    --     < > = values in this interval not displayed.     Recent Labs      17   17117   0540  09/08/17   1738  09/09/17   0451  09/09/17   0730  09/10/17   0415   WBC  19.1*  16.1*  22.0*  19.4*   --   15.6*   NEUTSPOLYS  90.20*  86.10*  89.50*  77.40*   --    --    LYMPHOCYTES  2.80*  6.50*  0.90*  5.20*   --    --    MONOCYTES  6.30  6.70  5.20  0.00   --    --    EOSINOPHILS  0.00  0.10  0.00  0.00   --    --    BASOPHILS  0.30  0.40  0.00  0.00   --    --    ASTSGOT  946*  489*   --    --   142*   --    ALTSGPT  497*  372*   --    --   176*   --    ALKPHOSPHAT  199*  164*   --    --   91   --    TBILIRUBIN  3.0*  3.5*   --    --   2.1*   --      Hemodynamics:  Temp (24hrs), Av.4 °C (97.5 °F), Min:36.1 °C (97 °F), Max:36.6 °C (97.9 °F)  Temperature: 36.1 °C (97 °F)  Pulse  Av.1  Min: 54  Max: 88Heart Rate (Monitored): 71  NIBP: 153/70     Respiratory:    Respiration: (!) 28, Pulse Oximetry: 91 %, O2 Daily Delivery Respiratory : Silicone Nasal Cannula        RUL Breath Sounds: Clear, RML Breath Sounds: Clear, RLL Breath Sounds: Diminished, VENKAT Breath Sounds: Clear, LLL Breath Sounds: Diminished  Fluids:    Intake/Output Summary (Last 24 hours) at 17 1233  Last data filed at 17 1200   Gross per 24 hour   Intake             3035 ml   Output              600 ml   Net             2435 ml        GI/Nutrition:  Orders Placed This Encounter   Procedures   • DIET ORDER     Standing Status:   Standing     Number of Occurrences:   1      Order Specific Question:   Diet:     Answer:   Regular [1]     Medical Decision Making, by Problem:  Active Hospital Problems    Diagnosis   • *Cholangitis [K83.0]   • Sepsis (CMS-HCC) [A41.9]   • Metabolic acidemia [E87.2]   • Obstructive jaundice [K83.8]   • DVT, recurrent, lower extremity, chronic (CMS-HCC) [I82.509]   • Hypothyroidism [E03.9]   • Diabetes mellitus (CMS-HCC) [E11.9]   • CKD (chronic kidney disease) [N18.9]   • Choledocholithiasis [K80.50]   • PUD (peptic ulcer disease) [K27.9]   • Hemorrhagic disorder due to extrinsic circulating anticoagulants (CMS-[facility]) [D68.32]   • Anemia [D64.9]     PROBLEMS;  1.  Choledocholithiasis with obstruction and acute cholangitis.  2.  Xarelto anticoagulation therapy for history of deep vein thrombosis.  3.  Abnormal biliary duct MRCP imaging.  4.  History of prior Billroth II procedure for prior peptic ulcer disease  5.  Epigastric abdominal pain radiating to the left upper quadrant.  6.  History of peptic ulcer disease.  7. Hypoxemia  8.  Multiple comorbidities as per above.    Plan:  1. Continue antibiotics  2. Monitor liver tests  3. May need to consider attempt at ERCP with pediatric colonoscope (but this is likely challenging and may have lower likelihood of success due to inability to get needed instruments to desired area) versus IR attempts at removal via existing drain once clinical status improves.  Will discuss ERCP with Dr. Ludwig who will be taking over care tomorrow for GI team.      Reviewed items::  Radiology images reviewed, Labs reviewed and Medications reviewed

## 2017-09-11 ENCOUNTER — APPOINTMENT (OUTPATIENT)
Dept: RADIOLOGY | Facility: MEDICAL CENTER | Age: 82
DRG: 853 | End: 2017-09-11
Attending: INTERNAL MEDICINE
Payer: MEDICARE

## 2017-09-11 LAB
GLUCOSE BLD-MCNC: 109 MG/DL (ref 65–99)
GLUCOSE BLD-MCNC: 164 MG/DL (ref 65–99)
GLUCOSE BLD-MCNC: 226 MG/DL (ref 65–99)
GLUCOSE BLD-MCNC: 230 MG/DL (ref 65–99)

## 2017-09-11 PROCEDURE — 82962 GLUCOSE BLOOD TEST: CPT | Mod: 91

## 2017-09-11 PROCEDURE — 93970 EXTREMITY STUDY: CPT

## 2017-09-11 PROCEDURE — 700111 HCHG RX REV CODE 636 W/ 250 OVERRIDE (IP): Performed by: INTERNAL MEDICINE

## 2017-09-11 PROCEDURE — 770001 HCHG ROOM/CARE - MED/SURG/GYN PRIV*

## 2017-09-11 PROCEDURE — 99233 SBSQ HOSP IP/OBS HIGH 50: CPT | Performed by: INTERNAL MEDICINE

## 2017-09-11 PROCEDURE — A9270 NON-COVERED ITEM OR SERVICE: HCPCS | Performed by: HOSPITALIST

## 2017-09-11 PROCEDURE — 97166 OT EVAL MOD COMPLEX 45 MIN: CPT

## 2017-09-11 PROCEDURE — G8979 MOBILITY GOAL STATUS: HCPCS | Mod: CI

## 2017-09-11 PROCEDURE — 71010 DX-CHEST-PORTABLE (1 VIEW): CPT

## 2017-09-11 PROCEDURE — A9270 NON-COVERED ITEM OR SERVICE: HCPCS | Performed by: INTERNAL MEDICINE

## 2017-09-11 PROCEDURE — 97162 PT EVAL MOD COMPLEX 30 MIN: CPT

## 2017-09-11 PROCEDURE — 93970 EXTREMITY STUDY: CPT | Mod: 26 | Performed by: SURGERY

## 2017-09-11 PROCEDURE — 90662 IIV NO PRSV INCREASED AG IM: CPT | Performed by: INTERNAL MEDICINE

## 2017-09-11 PROCEDURE — 700105 HCHG RX REV CODE 258: Performed by: INTERNAL MEDICINE

## 2017-09-11 PROCEDURE — G8978 MOBILITY CURRENT STATUS: HCPCS | Mod: CJ

## 2017-09-11 PROCEDURE — 700102 HCHG RX REV CODE 250 W/ 637 OVERRIDE(OP): Performed by: HOSPITALIST

## 2017-09-11 PROCEDURE — 90471 IMMUNIZATION ADMIN: CPT

## 2017-09-11 PROCEDURE — G8987 SELF CARE CURRENT STATUS: HCPCS | Mod: CJ

## 2017-09-11 PROCEDURE — G8988 SELF CARE GOAL STATUS: HCPCS | Mod: CI

## 2017-09-11 PROCEDURE — 3E0234Z INTRODUCTION OF SERUM, TOXOID AND VACCINE INTO MUSCLE, PERCUTANEOUS APPROACH: ICD-10-PCS | Performed by: HOSPITALIST

## 2017-09-11 PROCEDURE — 700102 HCHG RX REV CODE 250 W/ 637 OVERRIDE(OP): Performed by: INTERNAL MEDICINE

## 2017-09-11 RX ORDER — FUROSEMIDE 10 MG/ML
20 INJECTION INTRAMUSCULAR; INTRAVENOUS ONCE
Status: COMPLETED | OUTPATIENT
Start: 2017-09-11 | End: 2017-09-11

## 2017-09-11 RX ADMIN — POTASSIUM BICARBONATE 25 MEQ: 25 TABLET, EFFERVESCENT ORAL at 21:07

## 2017-09-11 RX ADMIN — ACETAMINOPHEN 650 MG: 325 TABLET, FILM COATED ORAL at 21:03

## 2017-09-11 RX ADMIN — INSULIN LISPRO 4 UNITS: 100 INJECTION, SOLUTION INTRAVENOUS; SUBCUTANEOUS at 13:50

## 2017-09-11 RX ADMIN — POTASSIUM CHLORIDE 20 MEQ: 1500 TABLET, EXTENDED RELEASE ORAL at 08:46

## 2017-09-11 RX ADMIN — INFLUENZA A VIRUSA/MICHIGAN/45/2015 X-275 (H1N1) ANTIGEN (FORMALDEHYDE INACTIVATED), INFLUENZA A VIRUS A/HONG KONG/4801/2014 X-263B (H3N2) ANTIGEN (FORMALDEHYDE INACTIVATED), AND INFLUENZA B VIRUS B/BRISBANE/60/2008 ANTIGEN (FORMALDEHYDE INACTIVATED) 0.5 ML: 60; 60; 60 INJECTION, SUSPENSION INTRAMUSCULAR at 13:54

## 2017-09-11 RX ADMIN — INSULIN LISPRO 4 UNITS: 100 INJECTION, SOLUTION INTRAVENOUS; SUBCUTANEOUS at 21:07

## 2017-09-11 RX ADMIN — FUROSEMIDE 20 MG: 10 INJECTION, SOLUTION INTRAMUSCULAR; INTRAVENOUS at 18:05

## 2017-09-11 RX ADMIN — STANDARDIZED SENNA CONCENTRATE AND DOCUSATE SODIUM 2 TABLET: 8.6; 5 TABLET, FILM COATED ORAL at 21:03

## 2017-09-11 RX ADMIN — OXYCODONE HYDROCHLORIDE 5 MG: 5 TABLET ORAL at 00:59

## 2017-09-11 RX ADMIN — LEVOTHYROXINE SODIUM 125 MCG: 125 TABLET ORAL at 06:31

## 2017-09-11 RX ADMIN — OXYCODONE HYDROCHLORIDE 5 MG: 5 TABLET ORAL at 11:31

## 2017-09-11 RX ADMIN — OXYCODONE HYDROCHLORIDE 5 MG: 5 TABLET ORAL at 21:03

## 2017-09-11 RX ADMIN — CEFTRIAXONE 2 G: 2 INJECTION, POWDER, FOR SOLUTION INTRAMUSCULAR; INTRAVENOUS at 08:46

## 2017-09-11 RX ADMIN — INSULIN LISPRO 3 UNITS: 100 INJECTION, SOLUTION INTRAVENOUS; SUBCUTANEOUS at 18:08

## 2017-09-11 ASSESSMENT — PAIN SCALES - GENERAL
PAINLEVEL_OUTOF10: 0
PAINLEVEL_OUTOF10: 2
PAINLEVEL_OUTOF10: 4
PAINLEVEL_OUTOF10: 3
PAINLEVEL_OUTOF10: 0
PAINLEVEL_OUTOF10: 4
PAINLEVEL_OUTOF10: 0
PAINLEVEL_OUTOF10: 0
PAINLEVEL_OUTOF10: 5
PAINLEVEL_OUTOF10: 0
PAINLEVEL_OUTOF10: 0

## 2017-09-11 ASSESSMENT — ENCOUNTER SYMPTOMS
VOMITING: 0
ABDOMINAL PAIN: 0
CHILLS: 0
HEADACHES: 0
FALLS: 0
SHORTNESS OF BREATH: 1
SPUTUM PRODUCTION: 0
STRIDOR: 0
ABDOMINAL PAIN: 1
NAUSEA: 0
NAUSEA: 1
LOSS OF CONSCIOUSNESS: 0
COUGH: 0
DIZZINESS: 0
PALPITATIONS: 0
TINGLING: 0
DIARRHEA: 0
NECK PAIN: 0
CONSTIPATION: 0
FEVER: 0
MYALGIAS: 0
WEAKNESS: 1
DEPRESSION: 0
SHORTNESS OF BREATH: 0

## 2017-09-11 ASSESSMENT — COGNITIVE AND FUNCTIONAL STATUS - GENERAL
SUGGESTED CMS G CODE MODIFIER MOBILITY: CN
MOVING FROM LYING ON BACK TO SITTING ON SIDE OF FLAT BED: UNABLE
MOBILITY SCORE: 6
STANDING UP FROM CHAIR USING ARMS: TOTAL
SUGGESTED CMS G CODE MODIFIER DAILY ACTIVITY: CJ
HELP NEEDED FOR BATHING: A LITTLE
MOVING TO AND FROM BED TO CHAIR: UNABLE
TURNING FROM BACK TO SIDE WHILE IN FLAT BAD: UNABLE
PERSONAL GROOMING: A LITTLE
DAILY ACTIVITIY SCORE: 20
DRESSING REGULAR LOWER BODY CLOTHING: A LITTLE
CLIMB 3 TO 5 STEPS WITH RAILING: TOTAL
TOILETING: A LITTLE
WALKING IN HOSPITAL ROOM: TOTAL

## 2017-09-11 ASSESSMENT — GAIT ASSESSMENTS
GAIT LEVEL OF ASSIST: CONTACT GUARD ASSIST
DISTANCE (FEET): 150
ASSISTIVE DEVICE: FRONT WHEEL WALKER

## 2017-09-11 ASSESSMENT — ACTIVITIES OF DAILY LIVING (ADL): TOILETING: INDEPENDENT

## 2017-09-11 NOTE — THERAPY
"Physical Therapy Evaluation completed.   Bed Mobility:  Supine to Sit: Contact Guard Assist  Transfers: Sit to Stand: Contact Guard Assist  Gait: Level Of Assist: Contact Guard Assist with Front-Wheel Walker       Plan of Care: Will benefit from Physical Therapy 3 times per week  Discharge Recommendations: Equipment: Will Continue to Assess for Equipment Needs. Post-acute therapy Discharge to a transitional care facility for continued skilled therapy services.    See \"Rehab Therapy-Acute\" Patient Summary Report for complete documentation.     "

## 2017-09-11 NOTE — CARE PLAN
Problem: Safety  Goal: Will remain free from falls    Intervention: Implement fall precautions  Pt's bed in lowest, locked position; bed alarm in use and call light within reach, pt calls appropriately       Problem: Venous Thromboembolism (VTW)/Deep Vein Thrombosis (DVT) Prevention:  Goal: Patient will participate in Venous Thrombosis (VTE)/Deep Vein Thrombosis (DVT)Prevention Measures    Intervention: Ensure patient wears graduated elastic stockings (CLAUDIO hose) and/or SCDs, if ordered, when in bed or chair (Remove at least once per shift for skin check)  SCD's in place        Problem: Mobility  Goal: Risk for activity intolerance will decrease    Intervention: Encourage patient to increase activity level in collaboration with Interdisciplinary Team  Pt up to chair for all meals

## 2017-09-11 NOTE — THERAPY
"Occupational Therapy Evaluation completed.   Functional Status:  Pt seen today for OT evaluation. Pt is at CGA level for functional mobility with FWW. CGA grooming in stance at sink. Papito for LB dressing. Pt reports mild-moderate fatigue at end of session. Pt limited by weakness, fatigue, and slightly impaired balance which impacts independence in ADLs and functional mobility.  Plan of Care: Will benefit from Occupational Therapy 3 times per week  Discharge Recommendations:  Equipment: Will Continue to Assess for Equipment Needs.     See \"Rehab Therapy-Acute\" Patient Summary Report for complete documentation.    "

## 2017-09-11 NOTE — PROGRESS NOTES
Gastroenterology Progress Note     Author:  Travis Solis M.D.  Date & Time Created: 9/11/2017 10:42 AM    CC:  Cholangitis    Interval History:  83-year-old  male with history of cholecystectomy, presents with epigastric abdominal pain, fevers, leukocytosis and MRCP imaging showing choledocholithiasis.  In 2010, patient had an MRCP that revealed intra and extrahepatic biliary ductal dilatation with fusiform tapering in the head of the pancreas associated with pancreatic ductal dilatation extending the length of the pancreas.  ERCP by Dr. Mcguire showed a normal ampulla of Vater.  The distal common biliary duct was reportedly normal and the proximal common biliary duct was dilated to 9.6 mm and the common hepatic duct was dilated to 18 mm.  There was no evidence of a stricture or filling defect.  The intrahepatic ducts were moderately dilated and the mass like confluence close to the gallbladder fossa seen on MRCP was felt to be an imaging artifact.  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  ERCP from September 2010 by Dr. Mcguire showed Billroth 2 anatomy.  No sphincterotomy was done.  Patient is unsure about his past surgical history.  Patient has noticed acute onset of epigastric abdominal pain radiating to his left upper quadrant, sharp and dull in quality, colicky, associated with fevers and rigors or chills.  He denied any jaundice.    9/10/2017 - Denies abdominal pain.  Oxygen levels improving.  Some nausea and poor appetite.  9/11/2017 - No abdominal pain, nausea or vomiting.  Clinical condition much improved.  No nausea or vomiting.  Discussed with medical team re ERCP vs PTC.  Also d/w Dr. Yeager from IR.    Review of Systems:  Review of Systems   Constitutional: Negative for chills and fever.   Respiratory: Positive for shortness of breath.    Cardiovascular: Negative for chest pain.   Gastrointestinal: Positive  for nausea. Negative for abdominal pain, constipation, diarrhea and vomiting.       Physical Exam:  Physical Exam   Constitutional: He is oriented to person, place, and time. He appears well-developed and well-nourished.   Cardiovascular: Normal rate and regular rhythm.    Pulmonary/Chest: Effort normal and breath sounds normal.   Abdominal: Soft. Bowel sounds are normal. He exhibits no distension. There is tenderness in the right upper quadrant.   Drain in RUQ   Musculoskeletal: He exhibits no edema.   Neurological: He is alert and oriented to person, place, and time.   Nursing note and vitals reviewed.      Labs:        Invalid input(s): [id number]      Recent Labs      09/09/17   0730  09/10/17   0415   SODIUM  137  137   POTASSIUM  4.1  3.8   CHLORIDE  113*  112   CO2  15*  16*   BUN  34*  27*   CREATININE  1.74*  1.40   MAGNESIUM  1.5   --    PHOSPHORUS  3.3   --    CALCIUM  6.4*  6.6*     Recent Labs      09/09/17   0730  09/10/17   0415   ALTSGPT  176*   --    ASTSGOT  142*   --    ALKPHOSPHAT  91   --    TBILIRUBIN  2.1*   --    GLUCOSE  135*  147*     Recent Labs      09/08/17   1738  09/09/17   0451  09/10/17   0415   RBC  3.39*  3.17*  3.00*   HEMOGLOBIN  10.8*  10.0*  9.6*   HEMATOCRIT  33.3*  31.7*  29.5*   PLATELETCT  104*  98*  94*   APTT   --   32.7   --      Recent Labs      09/08/17   1738  09/09/17   0451  09/09/17   0730  09/10/17   0415   WBC  22.0*  19.4*   --   15.6*   NEUTSPOLYS  89.50*  77.40*   --    --    LYMPHOCYTES  0.90*  5.20*   --    --    MONOCYTES  5.20  0.00   --    --    EOSINOPHILS  0.00  0.00   --    --    BASOPHILS  0.00  0.00   --    --    ASTSGOT   --    --   142*   --    ALTSGPT   --    --   176*   --    ALKPHOSPHAT   --    --   91   --    TBILIRUBIN   --    --   2.1*   --      Hemodynamics:  Temp (24hrs), Av.4 °C (97.5 °F), Min:36.1 °C (97 °F), Max:36.6 °C (97.9 °F)  Temperature: 36.6 °C (97.9 °F)  Pulse  Av.6  Min: 54  Max: 88Heart Rate (Monitored): 62  NIBP:  (!) 162/70     Respiratory:    Respiration: 19, Pulse Oximetry: 93 %        RUL Breath Sounds: Clear, RML Breath Sounds: Expiratory Wheezes, RLL Breath Sounds: Expiratory Wheezes, VENKAT Breath Sounds: Clear, LLL Breath Sounds: Diminished  Fluids:    Intake/Output Summary (Last 24 hours) at 09/08/17 1233  Last data filed at 09/08/17 1200   Gross per 24 hour   Intake             3035 ml   Output              600 ml   Net             2435 ml        GI/Nutrition:  Orders Placed This Encounter   Procedures   • DIET ORDER     Standing Status:   Standing     Number of Occurrences:   1     Order Specific Question:   Diet:     Answer:   Regular [1]   • DIET NPO     Standing Status:   Standing     Number of Occurrences:   8     Order Specific Question:   Restrict to:     Answer:   Strict [1]     Medical Decision Making, by Problem:  Active Hospital Problems    Diagnosis   • *Cholangitis [K83.0]   • Sepsis (CMS-[facility]) [A41.9]   • Metabolic acidemia [E87.2]   • Obstructive jaundice [K83.8]   • DVT, recurrent, lower extremity, chronic (CMS-HCC) [I82.509]   • Hypothyroidism [E03.9]   • Diabetes mellitus (CMS-[facility]) [E11.9]   • CKD (chronic kidney disease) [N18.9]   • Choledocholithiasis [K80.50]   • PUD (peptic ulcer disease) [K27.9]   • 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  Choledocholithiasis with obstruction and acute cholangitis - decompressed and clinically improved  2.  Xarelto anticoagulation therapy for history of deep vein thrombosis  3.  Abnormal biliary duct MRCP imaging - 5mm x 10mm CBD stone  4.  History of prior Billroth II procedure for prior peptic ulcer disease  5.  Epigastric abdominal pain radiating to the left upper quadrant - improved  6.  History of peptic ulcer disease  7. Hypoxemia  8.  Multiple comorbidities as per above    Plan:  1. Continue antibiotics  2. Monitor liver tests  3. IR for PTC guided attempt at biliary ampulla  dilation and stone passage into duodenum.  If not successful I will plan ERCP with passage of PTC wire antegrade with ERC rendezvous      Reviewed items::  Radiology images reviewed, Labs reviewed and Medications reviewed

## 2017-09-11 NOTE — PROGRESS NOTES
[facility] Hospitalist Progress Note    Date of Service: 2017    Chief Complaint  83 y.o. male admitted 2017 with syncope and RUQ pain.    Interval Problem Update  Pt went for biliary drain placement, noted significant AMS and respiratory distress post.  Pt noted to have choledocholithiasis and cholangitis causing sepsis.  Has hx of gastrojejunostomy which prevents ERCP from being done.  Pt seen and examined in ICU, ICU care given.  Discussed patient condition and plan with RN, RT and charge nurse / hot rounds.    Consultants/Specialty  Critical care - Dr Quan  GI - Dr Thomas    Disposition  Pt requires additional treatment in the hospital        Review of Systems   Constitutional: Positive for malaise/fatigue.   HENT: Negative for congestion.    Respiratory: Negative for cough, sputum production, shortness of breath and stridor.    Cardiovascular: Negative for chest pain, palpitations and leg swelling.   Gastrointestinal: Positive for abdominal pain. Negative for constipation, diarrhea, nausea and vomiting.   Genitourinary: Negative for dysuria and urgency.   Musculoskeletal: Negative for falls, myalgias and neck pain.   Neurological: Positive for weakness. Negative for dizziness, tingling, loss of consciousness and headaches.   Psychiatric/Behavioral: Negative for depression and suicidal ideas.      Physical Exam  Laboratory/Imaging   Hemodynamics  Temp (24hrs), Av.4 °C (97.5 °F), Min:36.1 °C (97 °F), Max:36.6 °C (97.9 °F)   Temperature: 36.6 °C (97.9 °F)  Pulse  Av.6  Min: 54  Max: 88 Heart Rate (Monitored): 62  NIBP: (!) 162/70      Respiratory      Respiration: 19, Pulse Oximetry: 93 %        RUL Breath Sounds: Clear, RML Breath Sounds: Expiratory Wheezes, RLL Breath Sounds: Expiratory Wheezes, VENKAT Breath Sounds: Clear, LLL Breath Sounds: Diminished    Fluids    Intake/Output Summary (Last 24 hours) at 17 1006  Last data filed at 17 0800   Gross per 24 hour   Intake              1295 ml   Output             3975 ml   Net            -2680 ml       Nutrition  Orders Placed This Encounter   Procedures   • DIET ORDER     Standing Status:   Standing     Number of Occurrences:   1     Order Specific Question:   Diet:     Answer:   Regular [1]     Physical Exam   Constitutional: He is oriented to person, place, and time.  Non-toxic appearance. No distress.   HENT:   Head: Normocephalic and atraumatic.   Mouth/Throat: No oropharyngeal exudate.   Eyes: Right eye exhibits no discharge. Left eye exhibits no discharge. No scleral icterus.   Neck: Neck supple. No tracheal deviation, no edema and no erythema present.   Cardiovascular: Normal rate and regular rhythm.  Exam reveals no friction rub.    No murmur heard.  Pulmonary/Chest: Accessory muscle usage present. No stridor. He is in respiratory distress. He has rales (scattered ).   Abdominal: Soft. Bowel sounds are normal. He exhibits no distension. There is tenderness (mild).   Musculoskeletal: He exhibits no edema or tenderness.   Neurological: He is alert and oriented to person, place, and time.   Skin: Skin is warm and dry. No rash noted. He is not diaphoretic.   Psychiatric: He has a normal mood and affect. Judgment and thought content normal.   Nursing note and vitals reviewed.      Recent Labs      09/08/17   1738  09/09/17 0451  09/10/17   0415   WBC  22.0*  19.4*  15.6*   RBC  3.39*  3.17*  3.00*   HEMOGLOBIN  10.8*  10.0*  9.6*   HEMATOCRIT  33.3*  31.7*  29.5*   MCV  98.2*  100.0*  98.3*   MCH  31.9  31.5  32.0   MCHC  32.4*  31.5*  32.5*   RDW  52.4*  55.1*  52.2*   PLATELETCT  104*  98*  94*   MPV  13.8*  14.1*  13.9*     Recent Labs      09/09/17   0730  09/10/17   0415   SODIUM  137  137   POTASSIUM  4.1  3.8   CHLORIDE  113*  112   CO2  15*  16*   GLUCOSE  135*  147*   BUN  34*  27*   CREATININE  1.74*  1.40   CALCIUM  6.4*  6.6*     Recent Labs      09/09/17   0451   APTT  32.7                  Assessment/Plan     * Cholangitis    Assessment & Plan    - continue zosyn  - d/t choledocholithiasis  - now s/p drain placement  - discussed with Dr Thomas, prior surgery makes regular ERCP unavailable  - IR procedure tomorrow          Metabolic acidemia   Assessment & Plan    - improved, d/t sepsis        Sepsis (CMS-HCC)- (present on admission)   Assessment & Plan    This is severe sepsis with the following associated acute organ dysfunction(s): hepatic failure.   - d/t cholangitis from choledocholithiasis  - continue zosyn  - await culture results  - stone extraction if able  - recommendations per GI         DVT, recurrent, lower extremity, chronic (CMS-HCC)   Assessment & Plan    - xarelto when cleared by GI         CKD (chronic kidney disease)- (present on admission)   Assessment & Plan    - improved from baseline  - repeat bmp in am        Hypothyroidism- (present on admission)   Assessment & Plan    - continue synthroid         Diabetes mellitus (CMS-HCC)- (present on admission)   Assessment & Plan    - continue ISS, adjust as needed        Altered mental status   Assessment & Plan    -Resolved        Acute respiratory failure (CMS-HCC)   Assessment & Plan    - post drain placement  - initially felt it was d/t meds but cxr obtained which looks wet  - now improved, weaning O2        Hemorrhagic disorder due to extrinsic circulating anticoagulants (CMS-HCC)   Assessment & Plan    - holding xarelto         PUD (peptic ulcer disease)   Assessment & Plan    - continue PPI        Choledocholithiasis   Assessment & Plan    - ERCP not available  - now s/p drain        Anemia- (present on admission)   Assessment & Plan    - no sign of gross bleeding  - repeat cbc in am            Reviewed items::  Labs reviewed, Medications reviewed and Radiology images reviewed  DVT prophylaxis - mechanical:  SCDs  Antibiotics:  Treating active infection/contamination beyond 24 hours perioperative coverage

## 2017-09-11 NOTE — PROGRESS NOTES
Pulmonary Critical Care Progress Note        DOS:  9/11/2017    Chief Complaint: SOB    History of Present Illness: 83 y.o. male admitted for sepsis, CBD stone identified, cholangits diagnoses.  Transhepatic drain place in IR 9/8 and post procedure O2 requirements went from 2 lpm to a NRBM -> HF NC O2.  CXR revealed atelectasis and pulmonary edema.      ROS:    Respiratory: negative cough, negative hemoptysis, negative pleuritic chest pain, positive shortness of breath, negative sputum production and negative wheezing,   Cardiac: negative chest pain, negative palpitations, positive orthopnea, negative leg swelling and positive PND,   GI: positive nausea, negative vomiting, positive abdominal pain and negative diarrhea.     Interval Events:  24 hour interval history reviewed     Feels ok  Some nausea - no pain  Poor sleep   IS 1250  NC O2 4lpm  I/Os reviewed  CXR improved edema  Tm 97.9  Drain 75 cc/12 hrs  IR to pull stone    D/w GI at bedside - IR to remove stone tomorrow    PFSH:  No change.    Respiratory:     Pulse Oximetry: 93 %  NC O2  WOB excellent today          Exam: labored breathing, rales bibasilar and diminished breath sounds mild  ImagingAvailable data reviewed         Invalid input(s): [id number]    HemoDynamics:  Pulse: (!) 56, Heart Rate (Monitored): 62  NIBP: 155/74       Exam: regular rhythm (Sinus), no ectopy, edema, ok pulses and cap refill  Imaging: Available data reviewed        Neuro:  GCS Total Sharon Coma Score: 15       Exam: no focal deficits noted mental status intact, slow  Imaging: Available data reviewed    Fluids:  Intake/Output       09/09/17 0700 - 09/10/17 0659 09/10/17 0700 - 09/11/17 0659 09/11/17 0700 - 09/12/17 0659      9363-6003 6162-2362 Total 7184-4460 4277-4961 Total 8177-8013 9322-9815 Total       Intake    P.O.  380  270 650  280  420 700  --  -- --    P.O. 380 270 650 280 420 700 -- -- --    I.V.  1850  1700 3550  780  80 860  --  -- --    Magnesium Sulfate Volume  50 -- 50 -- -- -- -- -- --    IV Piggyback Volume (IV Piggyback) 300 200 500 200 -- 200 -- -- --    IV Volume (NS) 1500 1500 3000 580 80 660 -- -- --    Enteral  --  20 20  20  -- 20  --  -- --    Free Water / Tube Flush -- 20 20 20 -- 20 -- -- --    Total Intake 2230 4220 0589 678 8793 -- -- --       Output    Urine  700  460 1160  2450  1300 3750  --  -- --    Number of Times Voided 2 x 2 x 4 x 8 x 3 x 11 x -- -- --    Void (ml)  2450 1300 3750 -- -- --    Drains  260  90 350  350  -- 350  --  -- --    Other 1 260 90 350 350 -- 350 -- -- --    Stool  --  -- --  --  -- --  --  -- --    Number of Times Stooled 0 x 0 x 0 x 0 x -- 0 x -- -- --    Total Output  2800 1300 4100 -- -- --       Net I/O     1270 1440 2710 -1720 -800 -2520 -- -- --           Recent Labs      09/09/17   0730  09/10/17   0415   SODIUM  137  137   POTASSIUM  4.1  3.8   CHLORIDE  113*  112   CO2  15*  16*   BUN  34*  27*   CREATININE  1.74*  1.40   MAGNESIUM  1.5   --    PHOSPHORUS  3.3   --    CALCIUM  6.4*  6.6*       GI/Nutrition:  Exam:  R lateral transhepatic biliary drain noted, BS positive, mild RUQ tenderness, ND, no CVAT  Imaging: Available data reviewed  taking PO  Liver Function  Recent Labs      17   0730  09/10/17   0415   ALTSGPT  176*   --    ASTSGOT  142*   --    ALKPHOSPHAT  91   --    TBILIRUBIN  2.1*   --    GLUCOSE  135*  147*       Heme:  Recent Labs      17   1738  17   0451  09/10/17   0415   RBC  3.39*  3.17*  3.00*   HEMOGLOBIN  10.8*  10.0*  9.6*   HEMATOCRIT  33.3*  31.7*  29.5*   PLATELETCT  104*  98*  94*   APTT   --   32.7   --        Infectious Disease:  Temp  Av.4 °C (97.6 °F)  Min: 36.1 °C (97 °F)  Max: 36.8 °C (98.2 °F)  Micro: reviewed      -  BC from [facility] positive for Escherichia coli  Biliary fluid cultures pending from     Recent Labs      17   1738  17   0451  17   0730  09/10/17   0415   WBC  22.0*  19.4*    --   15.6*   NEUTSPOLYS  89.50*  77.40*   --    --    LYMPHOCYTES  0.90*  5.20*   --    --    MONOCYTES  5.20  0.00   --    --    EOSINOPHILS  0.00  0.00   --    --    BASOPHILS  0.00  0.00   --    --    ASTSGOT   --    --   142*   --    ALTSGPT   --    --   176*   --    ALKPHOSPHAT   --    --   91   --    TBILIRUBIN   --    --   2.1*   --      Current Facility-Administered Medications   Medication Dose Frequency Provider Last Rate Last Dose   • cefTRIAXone (ROCEPHIN) 2 g in  mL IVPB  2 g Q24HRS Harvinder Quan M.D.   Stopped at 09/10/17 1120   • potassium chloride SA (Kdur) tablet 20 mEq  20 mEq DAILY Harvinder Quan M.D.   20 mEq at 09/10/17 1028   • insulin lispro (HUMALOG) injection 3-14 Units  3-14 Units 4X/DAY [facility]   3 Units at 09/10/17 2020   • oxycodone immediate-release (ROXICODONE) tablet 5 mg  5 mg Q4HRS PRN ANDREW MeyersONayeli   5 mg at 09/11/17 0059   • levothyroxine (SYNTHROID) tablet 125 mcg  125 mcg AM ES Darrel Cohn M.D.   125 mcg at 09/10/17 0552   • senna-docusate (PERICOLACE or SENOKOT S) 8.6-50 MG per tablet 2 Tab  2 Tab BID Darrel Cohn M.D.   Stopped at 09/07/17 2100    And   • polyethylene glycol/lytes (MIRALAX) PACKET 1 Packet  1 Packet QDAY PRN Darrel Cohn M.D.        And   • magnesium hydroxide (MILK OF MAGNESIA) suspension 30 mL  30 mL QDAY PRN Darrel Cohn M.D.        And   • bisacodyl (DULCOLAX) suppository 10 mg  10 mg QDAY PRN Darrel Cohn M.D.       • Respiratory Care per Protocol   Continuous RT Darrel Cohn M.D.       • NS infusion   Continuous Harvinder Quan M.D. 10 mL/hr at 09/10/17 1030     • acetaminophen (TYLENOL) tablet 650 mg  650 mg Q6HRS PRN Darrel Cohn M.D.       • ondansetron (ZOFRAN) syringe/vial injection 4 mg  4 mg Q4HRS PRN Darrel Cohn M.D.   4 mg at 09/10/17 1222   • ondansetron (ZOFRAN ODT) dispertab 4 mg  4 mg Q4HRS PRN Darrel Cohn M.D.       • glucose 4 g chewable tablet 16 g  16 g  Q15 MIN PRN Darrel Cohn M.D.        And   • dextrose 50% (D50W) injection 25 mL  25 mL Q15 MIN PRN Darrel Cohn M.D.         Last reviewed on 9/7/2017  5:03 PM by Jl Hays T    Quality  Measures:  Core Measures & Quality Metrics    Problems/plan:  Sepsis syndrome secondary to cholangitis and obstructing stone 5x10 mm - CBD.   Sepsis protocols   IV ABX   Perc biliary drain   Remove stone when improved by IR - 9/12 planned   Fluids/pressors PRN   GI evaluating  Bacteremia with E. coli presumably biliary source.   Repeat BC neg   Biliary fluid Gram stain negative, culture pending  Status post syncopal spell presumably secondary to above.   further eval if recurrent  Acute hypoxemic respiratory failure, placement of hepatobiliary drain and decompression of obstruction, presumably secondary to atelectasis and pulmonary edema.  Improved   RT protocols   Improved with time and pulmonary toilet   Forced diuresis   Wean HF NC O2 to regular nasal cannula   Serial CXR   IS/PEP - IPV PRN  Lifelong nonsmoker.  History of multiple abdominal surgeries including gastrojejunostomy, cholecystectomy complex anatomy, not a candidate for ERCP.   GI evaluating  History of deep venous thrombosis and prior Xarelto use, stopped yesterday.   Sequentials   Lovenox   Resume novel agent when/if clinically appropriate  Anemia and thrombocytopenia, chronic, mild.   Transfusion protocols  Hyponatremia secondary to above.  History of diabetes with uncontrolled blood sugars - SSI/longacting as needed  Elevated liver function studies and alkaline phosphatase secondary to cholangitis.   Trend CMP etc  Hypothyroidism, on replacement, history of thyroidectomy, replete  History of radical prostatectomy for cancer  History of peptic ulcer disease.  Therapies  Enteral nutrition if ok with GI  Prophylaxis  DNR    Continue to hold Xarelto   Check NIVT lower extremities, rule out DVT, if negative consider stopping Xarelto   NIVT + -  prior Rx - too low of a dose of Xarelto 5 bid?   Resume Xarelto when ok with GI - 3 month course  Calcium gluconate 1 g IV  Lasix x one dose IV  Heplock IVF  Change ceftriaxone 2 g every 24 hours  Keep in ICU until post IR - last week post IR he had severe episode of hypoxemia right after procedure      Discussed patient condition and risk of morbidity and/or mortality with RN, RT, Pharmacy, , Charge nurse / hot rounds, Patient and GI and hospitalist.

## 2017-09-11 NOTE — PROGRESS NOTES
Dr. Gallegos notified of gram stain and culture results from biliary fluid sent on 9/9/17. Per MD, patient is on appropriate antibiotics, no further orders at this time.

## 2017-09-12 ENCOUNTER — APPOINTMENT (OUTPATIENT)
Dept: RADIOLOGY | Facility: MEDICAL CENTER | Age: 82
DRG: 853 | End: 2017-09-12
Attending: INTERNAL MEDICINE
Payer: MEDICARE

## 2017-09-12 PROBLEM — R41.82 ALTERED MENTAL STATUS: Status: RESOLVED | Noted: 2017-09-08 | Resolved: 2017-09-12

## 2017-09-12 PROBLEM — Z66 DNR (DO NOT RESUSCITATE): Status: ACTIVE | Noted: 2017-09-12

## 2017-09-12 LAB
ANION GAP SERPL CALC-SCNC: 10 MMOL/L (ref 0–11.9)
BACTERIA FLD AEROBE CULT: ABNORMAL
BUN SERPL-MCNC: 21 MG/DL (ref 8–22)
CALCIUM SERPL-MCNC: 7.6 MG/DL (ref 8.5–10.5)
CHLORIDE SERPL-SCNC: 108 MMOL/L (ref 96–112)
CO2 SERPL-SCNC: 19 MMOL/L (ref 20–33)
CREAT SERPL-MCNC: 1.29 MG/DL (ref 0.5–1.4)
GFR SERPL CREATININE-BSD FRML MDRD: 53 ML/MIN/1.73 M 2
GLUCOSE BLD-MCNC: 192 MG/DL (ref 65–99)
GLUCOSE BLD-MCNC: 194 MG/DL (ref 65–99)
GLUCOSE BLD-MCNC: 222 MG/DL (ref 65–99)
GLUCOSE SERPL-MCNC: 120 MG/DL (ref 65–99)
GRAM STN SPEC: ABNORMAL
MAGNESIUM SERPL-MCNC: 1.8 MG/DL (ref 1.5–2.5)
POTASSIUM SERPL-SCNC: 3.8 MMOL/L (ref 3.6–5.5)
SIGNIFICANT IND 70042: ABNORMAL
SITE SITE: ABNORMAL
SODIUM SERPL-SCNC: 137 MMOL/L (ref 135–145)
SOURCE SOURCE: ABNORMAL

## 2017-09-12 PROCEDURE — 700111 HCHG RX REV CODE 636 W/ 250 OVERRIDE (IP): Performed by: RADIOLOGY

## 2017-09-12 PROCEDURE — 71010 DX-CHEST-PORTABLE (1 VIEW): CPT

## 2017-09-12 PROCEDURE — 82962 GLUCOSE BLOOD TEST: CPT | Mod: 91

## 2017-09-12 PROCEDURE — A9270 NON-COVERED ITEM OR SERVICE: HCPCS | Performed by: HOSPITALIST

## 2017-09-12 PROCEDURE — BF101ZZ FLUOROSCOPY OF BILE DUCTS USING LOW OSMOLAR CONTRAST: ICD-10-PCS | Performed by: RADIOLOGY

## 2017-09-12 PROCEDURE — 700117 HCHG RX CONTRAST REV CODE 255: Performed by: RADIOLOGY

## 2017-09-12 PROCEDURE — 99153 MOD SED SAME PHYS/QHP EA: CPT

## 2017-09-12 PROCEDURE — 0F9930Z DRAINAGE OF COMMON BILE DUCT WITH DRAINAGE DEVICE, PERCUTANEOUS APPROACH: ICD-10-PCS | Performed by: RADIOLOGY

## 2017-09-12 PROCEDURE — 700111 HCHG RX REV CODE 636 W/ 250 OVERRIDE (IP): Performed by: INTERNAL MEDICINE

## 2017-09-12 PROCEDURE — 700111 HCHG RX REV CODE 636 W/ 250 OVERRIDE (IP)

## 2017-09-12 PROCEDURE — 700102 HCHG RX REV CODE 250 W/ 637 OVERRIDE(OP): Performed by: INTERNAL MEDICINE

## 2017-09-12 PROCEDURE — 770001 HCHG ROOM/CARE - MED/SURG/GYN PRIV*

## 2017-09-12 PROCEDURE — 80048 BASIC METABOLIC PNL TOTAL CA: CPT

## 2017-09-12 PROCEDURE — 700102 HCHG RX REV CODE 250 W/ 637 OVERRIDE(OP): Performed by: HOSPITALIST

## 2017-09-12 PROCEDURE — 99233 SBSQ HOSP IP/OBS HIGH 50: CPT | Performed by: HOSPITALIST

## 2017-09-12 PROCEDURE — A9270 NON-COVERED ITEM OR SERVICE: HCPCS | Performed by: INTERNAL MEDICINE

## 2017-09-12 PROCEDURE — 0F793DZ DILATION OF COMMON BILE DUCT WITH INTRALUMINAL DEVICE, PERCUTANEOUS APPROACH: ICD-10-PCS | Performed by: RADIOLOGY

## 2017-09-12 PROCEDURE — 0FPB3DZ REMOVAL OF INTRALUMINAL DEVICE FROM HEPATOBILIARY DUCT, PERCUTANEOUS APPROACH: ICD-10-PCS | Performed by: RADIOLOGY

## 2017-09-12 PROCEDURE — 700105 HCHG RX REV CODE 258: Performed by: INTERNAL MEDICINE

## 2017-09-12 PROCEDURE — 0FC93ZZ EXTIRPATION OF MATTER FROM COMMON BILE DUCT, PERCUTANEOUS APPROACH: ICD-10-PCS | Performed by: RADIOLOGY

## 2017-09-12 PROCEDURE — 83735 ASSAY OF MAGNESIUM: CPT

## 2017-09-12 RX ORDER — SODIUM CHLORIDE 9 MG/ML
500 INJECTION, SOLUTION INTRAVENOUS
Status: ACTIVE | OUTPATIENT
Start: 2017-09-12 | End: 2017-09-12

## 2017-09-12 RX ORDER — MIDAZOLAM HYDROCHLORIDE 1 MG/ML
INJECTION INTRAMUSCULAR; INTRAVENOUS
Status: COMPLETED
Start: 2017-09-12 | End: 2017-09-12

## 2017-09-12 RX ORDER — ONDANSETRON 2 MG/ML
4 INJECTION INTRAMUSCULAR; INTRAVENOUS PRN
Status: ACTIVE | OUTPATIENT
Start: 2017-09-12 | End: 2017-09-12

## 2017-09-12 RX ORDER — MIDAZOLAM HYDROCHLORIDE 1 MG/ML
.5-2 INJECTION INTRAMUSCULAR; INTRAVENOUS PRN
Status: ACTIVE | OUTPATIENT
Start: 2017-09-12 | End: 2017-09-12

## 2017-09-12 RX ADMIN — MIDAZOLAM HYDROCHLORIDE 1 MG: 1 INJECTION, SOLUTION INTRAMUSCULAR; INTRAVENOUS at 10:45

## 2017-09-12 RX ADMIN — CEFTRIAXONE 2 G: 2 INJECTION, POWDER, FOR SOLUTION INTRAMUSCULAR; INTRAVENOUS at 08:43

## 2017-09-12 RX ADMIN — INSULIN LISPRO 3 UNITS: 100 INJECTION, SOLUTION INTRAVENOUS; SUBCUTANEOUS at 17:39

## 2017-09-12 RX ADMIN — FENTANYL CITRATE 25 MCG: 50 INJECTION, SOLUTION INTRAMUSCULAR; INTRAVENOUS at 10:45

## 2017-09-12 RX ADMIN — OXYCODONE HYDROCHLORIDE 5 MG: 5 TABLET ORAL at 13:13

## 2017-09-12 RX ADMIN — STANDARDIZED SENNA CONCENTRATE AND DOCUSATE SODIUM 2 TABLET: 8.6; 5 TABLET, FILM COATED ORAL at 21:08

## 2017-09-12 RX ADMIN — POTASSIUM BICARBONATE 25 MEQ: 25 TABLET, EFFERVESCENT ORAL at 13:13

## 2017-09-12 RX ADMIN — MIDAZOLAM HYDROCHLORIDE 1 MG: 1 INJECTION, SOLUTION INTRAMUSCULAR; INTRAVENOUS at 11:05

## 2017-09-12 RX ADMIN — IOHEXOL 75 ML: 300 INJECTION, SOLUTION INTRAVENOUS at 11:00

## 2017-09-12 RX ADMIN — INSULIN LISPRO 4 UNITS: 100 INJECTION, SOLUTION INTRAVENOUS; SUBCUTANEOUS at 21:07

## 2017-09-12 RX ADMIN — MIDAZOLAM 1 MG: 1 INJECTION INTRAMUSCULAR; INTRAVENOUS at 10:45

## 2017-09-12 RX ADMIN — INSULIN LISPRO 3 UNITS: 100 INJECTION, SOLUTION INTRAVENOUS; SUBCUTANEOUS at 13:12

## 2017-09-12 RX ADMIN — OXYCODONE HYDROCHLORIDE 5 MG: 5 TABLET ORAL at 21:08

## 2017-09-12 RX ADMIN — MIDAZOLAM HYDROCHLORIDE 1 MG: 1 INJECTION, SOLUTION INTRAMUSCULAR; INTRAVENOUS at 10:52

## 2017-09-12 ASSESSMENT — ENCOUNTER SYMPTOMS
PALPITATIONS: 0
CARDIOVASCULAR NEGATIVE: 1
DEPRESSION: 0
NAUSEA: 0
VOMITING: 0
CONSTITUTIONAL NEGATIVE: 1
ROS GI COMMENTS: NO ABDOMINAL PAIN.
FEVER: 0
HEARTBURN: 0
COUGH: 0
CONSTIPATION: 0
SHORTNESS OF BREATH: 1
DIARRHEA: 0
CHILLS: 0
SHORTNESS OF BREATH: 0
ABDOMINAL PAIN: 0

## 2017-09-12 ASSESSMENT — PAIN SCALES - GENERAL
PAINLEVEL_OUTOF10: 2
PAINLEVEL_OUTOF10: 1
PAINLEVEL_OUTOF10: 0
PAINLEVEL_OUTOF10: 7
PAINLEVEL_OUTOF10: 0
PAINLEVEL_OUTOF10: 0
PAINLEVEL_OUTOF10: 6
PAINLEVEL_OUTOF10: 1
PAINLEVEL_OUTOF10: 0
PAINLEVEL_OUTOF10: 2
PAINLEVEL_OUTOF10: 4
PAINLEVEL_OUTOF10: 0
PAINLEVEL_OUTOF10: 2

## 2017-09-12 NOTE — PROGRESS NOTES
Pt back to R110 on monitor via bed. Pt sleepy, but wakes easily to voice; oriented x4 and follows commands.

## 2017-09-12 NOTE — PROGRESS NOTES
Gastroenterology Progress Note     Author:  Travis Solis M.D.  Date & Time Created: 9/12/2017 4:06 PM    CC:  Cholangitis    Interval History:  83-year-old  male with history of cholecystectomy, presents with epigastric abdominal pain, fevers, leukocytosis and MRCP imaging showing choledocholithiasis.  In 2010, patient had an MRCP that revealed intra and extrahepatic biliary ductal dilatation with fusiform tapering in the head of the pancreas associated with pancreatic ductal dilatation extending the length of the pancreas.  ERCP by Dr. Mcguire showed a normal ampulla of Vater.  The distal common biliary duct was reportedly normal and the proximal common biliary duct was dilated to 9.6 mm and the common hepatic duct was dilated to 18 mm.  There was no evidence of a stricture or filling defect.  The intrahepatic ducts were moderately dilated and the mass like confluence close to the gallbladder fossa seen on MRCP was felt to be an imaging artifact.  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  ERCP from September 2010 by Dr. Mcguire showed Billroth 2 anatomy.  No sphincterotomy was done.  Patient is unsure about his past surgical history.  Patient has noticed acute onset of epigastric abdominal pain radiating to his left upper quadrant, sharp and dull in quality, colicky, associated with fevers and rigors or chills.  He denied any jaundice.    9/10/2017 - Denies abdominal pain.  Oxygen levels improving.  Some nausea and poor appetite.  9/11/2017 - No abdominal pain, nausea or vomiting.  Clinical condition much improved.  No nausea or vomiting.  Discussed with medical team re ERCP vs PTC.  Also d/w Dr. Yeager from IR.  9/12/2017 - Underwent PTC by IR with successful passage of stone into small bowel and dilation of biliary orifice (apparently hepatico-jejunostomy).  Now feeling very well and no complaints.    Review of  Systems:  Review of Systems   Constitutional: Negative.  Negative for chills and fever.   Respiratory: Positive for shortness of breath.    Cardiovascular: Negative.  Negative for chest pain.   Gastrointestinal: Negative for abdominal pain, constipation, diarrhea, heartburn, nausea and vomiting.   Skin: Negative.    Psychiatric/Behavioral: Negative for depression.       Physical Exam:  Physical Exam   Constitutional: He is oriented to person, place, and time. He appears well-developed and well-nourished.   HENT:   Head: Normocephalic and atraumatic.   Eyes: Conjunctivae are normal.   Neck: Neck supple.   Cardiovascular: Normal rate and regular rhythm.    Pulmonary/Chest: Effort normal and breath sounds normal.   Abdominal: Soft. Bowel sounds are normal. He exhibits no distension. There is tenderness in the right upper quadrant.   Drain in RUQ   Musculoskeletal: He exhibits no edema.   Neurological: He is alert and oriented to person, place, and time.   Skin: Skin is warm and dry.   Psychiatric: He has a normal mood and affect. His behavior is normal.   Nursing note and vitals reviewed.      Labs:        Invalid input(s): [id number]      Recent Labs      09/10/17   0415  17   044   SODIUM  137  137   POTASSIUM  3.8  3.8   CHLORIDE  112  108   CO2  16*  19*   BUN  27*  21   CREATININE  1.40  1.29   MAGNESIUM   --   1.8   CALCIUM  6.6*  7.6*     Recent Labs      09/10/17   0415  17   0442   GLUCOSE  147*  120*     Recent Labs      09/10/17   0415   RBC  3.00*   HEMOGLOBIN  9.6*   HEMATOCRIT  29.5*   PLATELETCT  94*     Recent Labs      09/10/17   0415   WBC  15.6*     Hemodynamics:  Temp (24hrs), Av.3 °C (97.3 °F), Min:36.1 °C (97 °F), Max:36.4 °C (97.6 °F)  Temperature: 36.4 °C (97.5 °F)  Pulse  Av.9  Min: 51  Max: 88Heart Rate (Monitored): 66  NIBP: 140/65     Respiratory:    Respiration: 18, Pulse Oximetry: 96 %        RUL Breath Sounds: Clear, RML Breath Sounds: Diminished, RLL Breath  Sounds: Diminished, VENKAT Breath Sounds: Clear, LLL Breath Sounds: Clear  Fluids:    Intake/Output Summary (Last 24 hours) at 09/08/17 1233  Last data filed at 09/08/17 1200   Gross per 24 hour   Intake             3035 ml   Output              600 ml   Net             2435 ml        GI/Nutrition:  Orders Placed This Encounter   Procedures   • DIET ORDER     Standing Status:   Standing     Number of Occurrences:   1     Order Specific Question:   Diet:     Answer:   Diabetic [3]     Medical Decision Making, by Problem:  Active Hospital Problems    Diagnosis   • *Cholangitis [K83.0]   • Sepsis (CMS-[facility]) [A41.9]   • Metabolic acidemia [E87.2]   • Obstructive jaundice [K83.8]   • DVT, recurrent, lower extremity, chronic (CMS-[facility]) [I82.509]   • Hypothyroidism [E03.9]   • Diabetes mellitus (CMS-HCC) [E11.9]   • CKD (chronic kidney disease) [N18.9]   • Choledocholithiasis [K80.50]   • PUD (peptic ulcer disease) [K27.9]   • Hemorrhagic disorder due to extrinsic circulating anticoagulants (CMS-[facility]) [D68.32]   • Anemia [D64.9]     Impression:  1.  Choledocholithiasis with obstruction and acute cholangitis - decompressed and clinically improved - S/P IR PTC guided stone passage to small bowel  2.  Xarelto anticoagulation therapy for history of deep vein thrombosis  3.  Abnormal biliary duct MRCP imaging - 5mm x 10mm CBD stone  4.  History of prior Billroth II procedure for prior peptic ulcer disease  5.  Epigastric abdominal pain radiating to the left upper quadrant - improved  6.  History of peptic ulcer disease  7. Hypoxemia  8.  Multiple comorbidities as per above    Plan:  1. Continue antibiotics - transition to oral as outpatient  2. Monitor liver tests    GI TO SIGN OFF / STAND BY - PLEASE CALL IF ANY CONCERNS      Reviewed items::  Radiology images reviewed, Labs reviewed and Medications reviewed

## 2017-09-12 NOTE — PROGRESS NOTES
Pt to IR via bed on monitor. Report given to IR RN, and pt's wife called to update her on pt status.

## 2017-09-12 NOTE — PROGRESS NOTES
Pulmonary Critical Care Progress Note        DOS:  9/12/2017    Chief Complaint: SOB    History of Present Illness: 83 y.o. male admitted for sepsis, CBD stone identified, cholangits diagnoses.  Transhepatic drain place in IR 9/8 and post procedure O2 requirements went from 2 lpm to a NRBM -> HF NC O2.  CXR revealed atelectasis and pulmonary edema.      ROS:    Respiratory: negative cough, negative hemoptysis, negative pleuritic chest pain, positive shortness of breath, negative sputum production and negative wheezing,   Cardiac: negative chest pain, negative palpitations, positive orthopnea, negative leg swelling and positive PND,   GI: positive nausea, negative vomiting, positive abdominal pain and negative diarrhea.     Interval Events:  24 hour interval history reviewed     Follows well  Tm 97.6  I/Os neg 2630cc/24hrs  Feels ok  Going for IR procedure this AM  Biliary drain 260/24hrs  Hemodynamics ok - HTN at times  IS 1200  NC O2 3lpm  NIVT positive yesterday again  Bile grew Strep v, blood E coli    Seen post IR - stone successfully pushed into jejunum  No hemodynamic or respiratory issues this time    PFSH:  No change.    Respiratory:     Pulse Oximetry: 98 %  NC O2  WOB excellent today pre and post op          Exam: labored breathing, rales bibasilar and diminished breath sounds mild  ImagingAvailable data reviewed         Invalid input(s): [id number]    HemoDynamics:  Pulse: 63  NIBP: 144/76       Exam: regular rhythm (Sinus), no ectopy, edema, ok pulses and cap refill  Imaging: Available data reviewed        Neuro:  GCS Total Sharon Coma Score: 15       Exam: no focal deficits noted mental status intact  Imaging: Available data reviewed    Fluids:  Intake/Output       09/10/17 0700 - 09/11/17 0659 09/11/17 0700 - 09/12/17 0659 09/12/17 0700 - 09/13/17 0659      9163-3606 3089-6893 Total 3248-0341 4271-5656 Total 8127-9282 9250-3892 Total       Intake    P.O.  280  420 700  400  250 650  --  -- --     P.O. 280 420 700 400 250 650 -- -- --    I.V.  780  80 860  215  100 315  --  -- --    IV Piggyback Volume (IV Piggyback) 200 -- 200 100 -- 100 -- -- --    IV Volume (NS) 580 80 660 115 100 215 -- -- --    Enteral  20  -- 20  --  20 20  --  -- --    Free Water / Tube Flush 20 -- 20 -- 20 20 -- -- --    Total Intake 2719 682 4204 615 370 985 -- -- --       Output    Urine  2450  1300 3750  875  1900 2775  --  -- --    Number of Times Voided 8 x 3 x 11 x 1 x 6 x 7 x -- -- --    Void (ml) 2450 1300 3750 875 1900 2775 -- -- --    Drains  350  -- 350  210  50 260  --  -- --    Other 1 350 -- 350 210 50 260 -- -- --    Stool  --  -- --  --  -- --  --  -- --    Number of Times Stooled 0 x -- 0 x -- 0 x 0 x -- -- --    Total Output 2800 1300 4100 1085 1950 3035 -- -- --       Net I/O     -1000 -573 -2790 -470 -1580 -- -- --           Recent Labs      09/09/17   0730  09/10/17   0415   SODIUM  137  137   POTASSIUM  4.1  3.8   CHLORIDE  113*  112   CO2  15*  16*   BUN  34*  27*   CREATININE  1.74*  1.40   MAGNESIUM  1.5   --    PHOSPHORUS  3.3   --    CALCIUM  6.4*  6.6*       GI/Nutrition:  Exam:  R lateral transhepatic biliary drain noted, BS positive, mild RUQ tenderness, ND, no CVAT  Imaging: Available data reviewed  taking PO  Liver Function  Recent Labs      09/09/17   0730  09/10/17   0415   ALTSGPT  176*   --    ASTSGOT  142*   --    ALKPHOSPHAT  91   --    TBILIRUBIN  2.1*   --    GLUCOSE  135*  147*       Heme:  Recent Labs      09/10/17   0415   RBC  3.00*   HEMOGLOBIN  9.6*   HEMATOCRIT  29.5*   PLATELETCT  94*       Infectious Disease:  Temp  Av.4 °C (97.5 °F)  Min: 36.2 °C (97.2 °F)  Max: 36.6 °C (97.9 °F)  Micro: reviewed      -  BC from [facility] [place], [place] positive for Escherichia coli  Biliary fluid cultures pending from     Recent Labs      17   0730  09/10/17   0415   WBC   --   15.6*   ASTSGOT  142*   --    ALTSGPT  176*   --    ALKPHOSPHAT  91   --    TBILIRUBIN  2.1*    --      Current Facility-Administered Medications   Medication Dose Frequency Provider Last Rate Last Dose   • potassium bicarbonate (KLYTE) 25 MEQ effervescent tablet TBEF 25 mEq  25 mEq BID Harvinder Quan M.D.   25 mEq at 09/11/17 2107   • cefTRIAXone (ROCEPHIN) 2 g in  mL IVPB  2 g Q24HRS Harvinder Quan M.D.   Stopped at 09/11/17 0916   • insulin lispro (HUMALOG) injection 3-14 Units  3-14 Units 4X/DAY ACHS Harvinder Gallegos D.O.   4 Units at 09/11/17 2107   • oxycodone immediate-release (ROXICODONE) tablet 5 mg  5 mg Q4HRS PRN Chanelle Cheema D.O.   5 mg at 09/11/17 2103   • levothyroxine (SYNTHROID) tablet 125 mcg  125 mcg AM ES Darrel Cohn M.D.   125 mcg at 09/11/17 0631   • senna-docusate (PERICOLACE or SENOKOT S) 8.6-50 MG per tablet 2 Tab  2 Tab BID Darrel Cohn M.D.   2 Tab at 09/11/17 2103    And   • polyethylene glycol/lytes (MIRALAX) PACKET 1 Packet  1 Packet QDAY PRN Darrel Cohn M.D.        And   • magnesium hydroxide (MILK OF MAGNESIA) suspension 30 mL  30 mL QDAY PRN Darrel Cohn M.D.        And   • bisacodyl (DULCOLAX) suppository 10 mg  10 mg QDAY PRN Darrel Cohn M.D.       • Respiratory Care per Protocol   Continuous RT Darrel Cohn M.D.       • NS infusion   Continuous Harvinder Quan M.D. 10 mL/hr at 09/10/17 1030     • acetaminophen (TYLENOL) tablet 650 mg  650 mg Q6HRS PRN Darrel Cohn M.D.   650 mg at 09/11/17 2103   • ondansetron (ZOFRAN) syringe/vial injection 4 mg  4 mg Q4HRS PRN Darrel Cohn M.D.   4 mg at 09/10/17 1222   • ondansetron (ZOFRAN ODT) dispertab 4 mg  4 mg Q4HRS PRN Darrel Cohn M.D.       • glucose 4 g chewable tablet 16 g  16 g Q15 MIN PRN Darrel Cohn M.D.        And   • dextrose 50% (D50W) injection 25 mL  25 mL Q15 MIN PRN Darrel Cohn M.D.         Last reviewed on 9/7/2017  5:03 PM by Sandee Mosquera    Quality  Measures:  Core Measures & Quality Metrics    Problems/plan:  Sepsis syndrome secondary to  cholangitis and obstructing stone 5x10 mm - CBD. IMPROVED   Sepsis protocols   IV ABX   Perc biliary drain   Remove stone when improved by IR - 9/12 planned   Fluids/pressors PRN   GI evaluating  Bacteremia with E. coli presumably biliary source.   Repeat BC neg   Biliary fluid Gram stain negative, culture + Strep viridans  Status post syncopal spell presumably secondary to above.   further eval if recurrent  Acute hypoxemic respiratory failure, placement of hepatobiliary drain and decompression of obstruction, presumably secondary to atelectasis and pulmonary edema.  Improved   RT protocols   Improved with time and pulmonary toilet   Forced diuresis   Wean HF NC O2 to regular nasal cannula   Serial CXR   IS/PEP - IPV PRN  Lifelong nonsmoker.  History of multiple abdominal surgeries including gastrojejunostomy, cholecystectomy complex anatomy, not a candidate for ERCP.   GI evaluating  History of deep venous thrombosis and prior Xarelto use, stopped yesterday.   Sequentials   Lovenox   Resume novel agent when/if clinically appropriate  Anemia and thrombocytopenia, chronic, mild.   Transfusion protocols  Hyponatremia secondary to above.  History of diabetes with uncontrolled blood sugars - SSI/longacting as needed  Elevated liver function studies and alkaline phosphatase secondary to cholangitis.   Trend CMP etc  Hypothyroidism, on replacement, history of thyroidectomy, replete  History of radical prostatectomy for cancer  History of peptic ulcer disease.  Therapies  Enteral nutrition if ok with GI  Prophylaxis  DNR    Continue to hold Xarelto another day  NIVT + - prior Rx - too low of a dose of Xarelto 5 bid?  Resume Xarelto when ok with GI - 3 month course  Change ceftriaxone 2 g every 24 hours  Keep in ICU until post IR - last week post IR he had severe episode of hypoxemia right after procedure  Transfer out later today  Stop ABX when ok with GI - prob go few more days post removal of stone and stent  placement      Discussed patient condition and risk of morbidity and/or mortality with RN, RT, Pharmacy, , Charge nurse / hot rounds, Patient and GI and hospitalist.

## 2017-09-12 NOTE — CARE PLAN
Problem: Respiratory:  Goal: Respiratory status will improve    Intervention: Educate and encourage incentive spirometry usage  Pt is self motivated to use IS; is pulling about 1250 on IS.       Problem: Skin Integrity  Goal: Risk for impaired skin integrity will decrease    Intervention: Assess and monitor skin integrity, appearance and/or temperature  Pt's skin assessed this morning. Pt able to turn/reposition self; reminded to periodically turn. Pt provided with pillows for support/positioning.

## 2017-09-12 NOTE — PROGRESS NOTES
IR Procedure Note:    Dr. Huerta performed a percutaneous transhepatic cholangiogram with bile stone removal.  Biliary drain exchange.The pt tolerated the procedure well, vital signs stable; ETCo2 baseline 44, with consistent waveform during the procedure. Tape and gauze applied to drain tube on Right upper abdomen, CDI and soft.  Report given to bedside RN.  Pt transported to R110 .

## 2017-09-12 NOTE — DISCHARGE PLANNING
Discussed pt during AM Rounds. RN report pt is A&Ox4. The pt is currently on 4 LPM of O2. Pt is scheduled for surgery later on this morning. PT and OT have assessed the pt and determined that the pt would benefit from therapy at least 3 times a week. The pt's most likely discharge disposition is a SNF. The pt lives in [place]. Pt is a a DNR. There is no AD. His spouse, Sarai is his discission maker.

## 2017-09-12 NOTE — PROGRESS NOTES
[facility] Hospitalist Progress Note    Date of Service: 2017    Chief Complaint  83 y.o. male admitted 2017 with abdominal pain.    Interval Problem Update  Mr. Willis was transferred from [facility] due to biliary obstruction.   He has had IR procedures x 2. 2.5 liters urine over night.   He will have a percutaneous extraction by IR today. He denies pain. Dr. Ludwig is following.   Consultants/Specialty  [facility]  Critical care  I discussed his condition with Dr. Quan on ICU Hot Rounds.   Disposition  surgical        Review of Systems   Constitutional: Negative for chills and fever.   Respiratory: Negative for cough and shortness of breath.    Cardiovascular: Negative for chest pain and palpitations.   Gastrointestinal: Negative for nausea and vomiting.        No abdominal pain.   All other systems reviewed and are negative.     Physical Exam  Laboratory/Imaging   Hemodynamics  Temp (24hrs), Av.4 °C (97.5 °F), Min:36.2 °C (97.2 °F), Max:36.6 °C (97.9 °F)   Temperature: 36.4 °C (97.6 °F)  Pulse  Av.6  Min: 54  Max: 88    NIBP: 144/76      Respiratory      Respiration: 19, Pulse Oximetry: 94 %        RUL Breath Sounds: Clear, RML Breath Sounds: Expiratory Wheezes, RLL Breath Sounds: Expiratory Wheezes, VENKAT Breath Sounds: Clear, LLL Breath Sounds: Diminished    Fluids    Intake/Output Summary (Last 24 hours) at 17 0742  Last data filed at 17 0600   Gross per 24 hour   Intake             1005 ml   Output             3635 ml   Net            -2630 ml       Nutrition  Orders Placed This Encounter   Procedures   • DIET NPO     Standing Status:   Standing     Number of Occurrences:   8     Order Specific Question:   Restrict to:     Answer:   Strict [1]     Physical Exam   Constitutional: He is oriented to person, place, and time. No distress.   Neck: Neck supple.   Cardiovascular: Normal rate and regular rhythm.    Abdominal: Soft. He exhibits distension.   Right-sided  drain in place. Multiple well-healed scars.     Musculoskeletal: He exhibits no edema or tenderness.   Neurological: He is alert and oriented to person, place, and time.   Skin: He is not diaphoretic.   Psychiatric: He has a normal mood and affect. His behavior is normal.       Recent Labs      09/10/17   0415   WBC  15.6*   RBC  3.00*   HEMOGLOBIN  9.6*   HEMATOCRIT  29.5*   MCV  98.3*   MCH  32.0   MCHC  32.5*   RDW  52.2*   PLATELETCT  94*   MPV  13.9*     Recent Labs      09/10/17   0415  09/12/17   0442   SODIUM  137  137   POTASSIUM  3.8  3.8   CHLORIDE  112  108   CO2  16*  19*   GLUCOSE  147*  120*   BUN  27*  21   CREATININE  1.40  1.29   CALCIUM  6.6*  7.6*                      Assessment/Plan     * Cholangitis   Assessment & Plan    - continue zosyn  - d/t choledocholithiasis  - s/p Angioplasty of of choledocojejunostomy anastomoses and push CBD stone into jejunum          Sepsis (CMS-HCC)- (present on admission)   Assessment & Plan    This is severe sepsis with the following associated acute organ dysfunction(s): hepatic failure.   - d/t cholangitis from choledocholithiasis. Strep Viridans and E. Coli growing from cultures.   - continue zosyn  - stone extraction           DVT, recurrent, lower extremity, chronic (CMS-HCC)   Assessment & Plan    - xarelto when cleared by GI   -  will need to restart at 15 bid for 21 days then 20 daily when cleared        Obstructive jaundice   Assessment & Plan    Bili 2. Repeat CMP tomorrow.        Metabolic acidemia   Assessment & Plan    - improved, d/t sepsis        CKD (chronic kidney disease)- (present on admission)   Assessment & Plan    - improved from baseline  - repeat cmp in am        Hypothyroidism- (present on admission)   Assessment & Plan    - continue synthroid         Diabetes mellitus (CMS-HCC)- (present on admission)   Assessment & Plan    - continue ISS, adjust as needed        DNR (do not resuscitate)- (present on admission)   Assessment & Plan    Per  patient wishes.        Acute respiratory failure (CMS-HCC)   Assessment & Plan    - post drain placement  - initially felt it was d/t meds but cxr obtained which looks wet  - on supplemental O2  -IS is being encouraged.   -Pulmonology consulted and following        Hemorrhagic disorder due to extrinsic circulating anticoagulants (CMS-HCC)   Assessment & Plan    - holding xarelto         PUD (peptic ulcer disease)   Assessment & Plan    - continue PPI        Choledocholithiasis   Assessment & Plan    - ERCP not available  - now s/p drain        Anemia- (present on admission)   Assessment & Plan    - no sign of gross bleeding  - repeat cbc in am            Reviewed items::  Labs reviewed and Medications reviewed  Long catheter::  No Long  DVT prophylaxis pharmacological::  Contraindicated - High bleeding risk

## 2017-09-12 NOTE — CARE PLAN
Problem: Pain Management  Goal: Pain level will decrease to patient's comfort goal    Intervention: Follow pain managment plan developed in collaboration with patient and Interdisciplinary Team  Pt reports that his back pain is well controled with PO pain medication and/or changing position (e.g. Sitting up in a chair).        Problem: Mobility  Goal: Risk for activity intolerance will decrease    Intervention: Encourage patient to increase activity level in collaboration with Interdisciplinary Team  Pt ambulated today with PT/OT and got up to a chair in the middle of the day and late afternoon.

## 2017-09-12 NOTE — OR SURGEON
Immediate Post- Operative Note        PostOp Diagnosis: Choledocolithiasis    Procedure(s): Angioplasty of of choledocojejunostomy anastomoses and push CBD stone into jejunum  Upsize int/ext stent to 12Fr    Estimated Blood Loss: Less than 5 ml        Complications: None            9/12/2017     11:36 AM     Jesse Huerta

## 2017-09-12 NOTE — CARE PLAN
Problem: Bowel/Gastric:  Goal: Normal bowel function is maintained or improved  Outcome: PROGRESSING SLOWER THAN EXPECTED  Patient has not had a bowel movement since 09/08. Has been refusing stool softeners. Educated patient on use of narcotics and the effects on bowel function. Patient agreed to take stool softeners today.     Problem: Fluid Volume:  Goal: Will maintain balanced intake and output  Outcome: PROGRESSING AS EXPECTED  Patient given lasix tonight to help with breathing and maintaining adequate oxygenation. Improved oxygenation and better reserve with activity.

## 2017-09-13 ENCOUNTER — APPOINTMENT (OUTPATIENT)
Dept: RADIOLOGY | Facility: MEDICAL CENTER | Age: 82
DRG: 853 | End: 2017-09-13
Attending: INTERNAL MEDICINE
Payer: MEDICARE

## 2017-09-13 LAB
GLUCOSE BLD-MCNC: 119 MG/DL (ref 65–99)
GLUCOSE BLD-MCNC: 133 MG/DL (ref 65–99)
GLUCOSE BLD-MCNC: 211 MG/DL (ref 65–99)
GLUCOSE BLD-MCNC: 238 MG/DL (ref 65–99)
GLUCOSE BLD-MCNC: 287 MG/DL (ref 65–99)

## 2017-09-13 PROCEDURE — 11055 PARING/CUTG B9 HYPRKER LES 1: CPT

## 2017-09-13 PROCEDURE — 700105 HCHG RX REV CODE 258: Performed by: INTERNAL MEDICINE

## 2017-09-13 PROCEDURE — 700102 HCHG RX REV CODE 250 W/ 637 OVERRIDE(OP): Performed by: HOSPITALIST

## 2017-09-13 PROCEDURE — 700111 HCHG RX REV CODE 636 W/ 250 OVERRIDE (IP): Performed by: INTERNAL MEDICINE

## 2017-09-13 PROCEDURE — 71010 DX-CHEST-PORTABLE (1 VIEW): CPT

## 2017-09-13 PROCEDURE — 700102 HCHG RX REV CODE 250 W/ 637 OVERRIDE(OP): Performed by: INTERNAL MEDICINE

## 2017-09-13 PROCEDURE — A9270 NON-COVERED ITEM OR SERVICE: HCPCS | Performed by: INTERNAL MEDICINE

## 2017-09-13 PROCEDURE — 82962 GLUCOSE BLOOD TEST: CPT | Mod: 91

## 2017-09-13 PROCEDURE — A9270 NON-COVERED ITEM OR SERVICE: HCPCS | Performed by: HOSPITALIST

## 2017-09-13 PROCEDURE — 770006 HCHG ROOM/CARE - MED/SURG/GYN SEMI*

## 2017-09-13 PROCEDURE — 99233 SBSQ HOSP IP/OBS HIGH 50: CPT | Performed by: HOSPITALIST

## 2017-09-13 RX ADMIN — OXYCODONE HYDROCHLORIDE 5 MG: 5 TABLET ORAL at 02:14

## 2017-09-13 RX ADMIN — CEFTRIAXONE 2 G: 2 INJECTION, POWDER, FOR SOLUTION INTRAMUSCULAR; INTRAVENOUS at 11:26

## 2017-09-13 RX ADMIN — INSULIN LISPRO 4 UNITS: 100 INJECTION, SOLUTION INTRAVENOUS; SUBCUTANEOUS at 21:38

## 2017-09-13 RX ADMIN — SODIUM CHLORIDE: 9 INJECTION, SOLUTION INTRAVENOUS at 01:25

## 2017-09-13 RX ADMIN — INSULIN LISPRO 4 UNITS: 100 INJECTION, SOLUTION INTRAVENOUS; SUBCUTANEOUS at 14:31

## 2017-09-13 RX ADMIN — OXYCODONE HYDROCHLORIDE 5 MG: 5 TABLET ORAL at 09:12

## 2017-09-13 RX ADMIN — STANDARDIZED SENNA CONCENTRATE AND DOCUSATE SODIUM 2 TABLET: 8.6; 5 TABLET, FILM COATED ORAL at 20:21

## 2017-09-13 RX ADMIN — OXYCODONE HYDROCHLORIDE 5 MG: 5 TABLET ORAL at 20:22

## 2017-09-13 RX ADMIN — STANDARDIZED SENNA CONCENTRATE AND DOCUSATE SODIUM 2 TABLET: 8.6; 5 TABLET, FILM COATED ORAL at 09:12

## 2017-09-13 RX ADMIN — LEVOTHYROXINE SODIUM 125 MCG: 125 TABLET ORAL at 05:24

## 2017-09-13 ASSESSMENT — ENCOUNTER SYMPTOMS
NAUSEA: 0
COUGH: 0
NEUROLOGICAL NEGATIVE: 1
VOMITING: 0
CHILLS: 0
SHORTNESS OF BREATH: 0
ROS GI COMMENTS: NO ABDOMINAL PAIN.
FEVER: 0
PALPITATIONS: 0

## 2017-09-13 ASSESSMENT — PAIN SCALES - GENERAL
PAINLEVEL_OUTOF10: 4
PAINLEVEL_OUTOF10: 0
PAINLEVEL_OUTOF10: 2
PAINLEVEL_OUTOF10: 2
PAINLEVEL_OUTOF10: 4

## 2017-09-13 NOTE — CARE PLAN
Problem: Safety  Goal: Will remain free from injury  Outcome: PROGRESSING AS EXPECTED  Bed alarm in place, treaded slipper socks in place, personal belongings within reach. Discussed need for pt to call prior to getting out of bed. Pt calls appropriately and waits for healthcare staff to arrive before getting out of bed.     Problem: Pain Management  Goal: Pain level will decrease to patient's comfort goal  Outcome: PROGRESSING AS EXPECTED  Discussed pain management with pt. Pt states PO pain medications are effective at reducing pain to pt's comfort goal. Instructed pt to call if pain increases.

## 2017-09-13 NOTE — PROGRESS NOTES
[facility] Hospitalist Progress Note    Date of Service: 2017    Chief Complaint  83 y.o. male admitted 2017 with abdominal pain.    Interval Problem Update        Mr. Willis was transferred from [facility] due to biliary obstruction.   .     Now on surgical floor    Afebrile    Percutaneous tube is present and draining     He denies pain.     Dr. Ludwig of GI is following.     Hx of dvt    Consultants/Specialty  [facility]  Critical care    Disposition  surgical        Review of Systems   Constitutional: Negative for chills and fever.   HENT: Negative.    Respiratory: Negative for cough and shortness of breath.    Cardiovascular: Negative for chest pain and palpitations.   Gastrointestinal: Negative for nausea and vomiting.        No abdominal pain.   Genitourinary: Negative.    Skin: Negative.    Neurological: Negative.    All other systems reviewed and are negative.     Physical Exam  Laboratory/Imaging   Hemodynamics  Temp (24hrs), Av.6 °C (97.8 °F), Min:36.3 °C (97.4 °F), Max:36.9 °C (98.4 °F)   Temperature: 36.3 °C (97.4 °F)  Pulse  Av.9  Min: 51  Max: 88 Heart Rate (Monitored): 60  Blood Pressure : 145/81, NIBP: 152/68      Respiratory      Respiration: 18, Pulse Oximetry: 93 %        RUL Breath Sounds: Clear, RML Breath Sounds: Diminished;Clear, RLL Breath Sounds: Clear;Diminished, VENKAT Breath Sounds: Clear, LLL Breath Sounds: Clear;Diminished    Fluids    Intake/Output Summary (Last 24 hours) at 17 1402  Last data filed at 17 1100   Gross per 24 hour   Intake              780 ml   Output             1855 ml   Net            -1075 ml       Nutrition  Orders Placed This Encounter   Procedures   • DIET ORDER     Standing Status:   Standing     Number of Occurrences:   1     Order Specific Question:   Diet:     Answer:   Diabetic [3]     Physical Exam   Constitutional: He is oriented to person, place, and time. No distress.   Neck: Neck supple.   Cardiovascular:  Normal rate and regular rhythm.  Exam reveals no gallop and no friction rub.    No murmur heard.  Abdominal: Soft. He exhibits distension. There is no tenderness. There is no rebound and no guarding.   Right-sided drain in place. Multiple well-healed scars.     Musculoskeletal: He exhibits no edema or tenderness.   Neurological: He is alert and oriented to person, place, and time.   Skin: He is not diaphoretic. No erythema. No pallor.   Psychiatric: He has a normal mood and affect. His behavior is normal.           Recent Labs      09/12/17   0442   SODIUM  137   POTASSIUM  3.8   CHLORIDE  108   CO2  19*   GLUCOSE  120*   BUN  21   CREATININE  1.29   CALCIUM  7.6*                      Assessment/Plan     * Cholangitis- (present on admission)   Assessment & Plan    - continue zosyn  - d/t choledocholithiasis  - s/p Angioplasty of of choledocojejunostomy anastomoses and push CBD stone into jejunum          Sepsis (CMS-HCC)- (present on admission)   Assessment & Plan    This WAS  severe sepsis with the following associated acute organ dysfunction(s): hepatic failure.   - d/t cholangitis from choledocholithiasis. Strep Viridans and E. Coli growing from cultures.     Sepsis is RESOLVED    - continue iv rocephin            DVT, recurrent, lower extremity, chronic (CMS-HCC)   Assessment & Plan    - xarelto when cleared by GI   -         Obstructive jaundice   Assessment & Plan    Bili 2. Repeat CMP tomorrow.        Metabolic acidemia   Assessment & Plan    - improved, d/t sepsis        CKD (chronic kidney disease)- (present on admission)   Assessment & Plan    e  - repeat cmp in am        Hypothyroidism- (present on admission)   Assessment & Plan    - continue synthroid         Diabetes mellitus (CMS-HCC)- (present on admission)   Assessment & Plan    - continue ISS, adjust as needed        DNR (do not resuscitate)- (present on admission)   Assessment & Plan    Per patient wishes.        Acute respiratory failure (CMS-HCC)    Assessment & Plan    -resolved        Hemorrhagic disorder due to extrinsic circulating anticoagulants (CMS-HCC)   Assessment & Plan    - holding xarelto         PUD (peptic ulcer disease)   Assessment & Plan    - continue PPI        Choledocholithiasis- (present on admission)   Assessment & Plan    - ERCP not available  - now s/p drain        Anemia- (present on admission)   Assessment & Plan    IR drain is bloody  - repeat cbc in am            Reviewed items::  Labs reviewed and Medications reviewed  Long catheter::  No Long  DVT prophylaxis pharmacological::  Contraindicated - High bleeding risk  DVT prophylaxis - mechanical:  SCDs

## 2017-09-13 NOTE — PROGRESS NOTES
Pt arrived to unit from ICU.   Bed alarm in place, treaded slipper socks in place.  Pt alert and oriented, standby assist to mobilize.   Pt denies pain at this time.   4L Nasal cannula in place, pulse oximetry at 94%.   Pt denies numbness and tingling, moves all extremities.   Discussed use of miralax with pt to promote normal bowel function. Pt states willingness to take miralax with morning medications.

## 2017-09-14 ENCOUNTER — APPOINTMENT (OUTPATIENT)
Dept: RADIOLOGY | Facility: MEDICAL CENTER | Age: 82
DRG: 853 | End: 2017-09-14
Attending: INTERNAL MEDICINE
Payer: MEDICARE

## 2017-09-14 VITALS
TEMPERATURE: 97.9 F | OXYGEN SATURATION: 90 % | RESPIRATION RATE: 18 BRPM | BODY MASS INDEX: 21.13 KG/M2 | WEIGHT: 156 LBS | HEIGHT: 72 IN | HEART RATE: 64 BPM | SYSTOLIC BLOOD PRESSURE: 158 MMHG | DIASTOLIC BLOOD PRESSURE: 68 MMHG

## 2017-09-14 PROBLEM — A41.9 SEPSIS, UNSPECIFIED: Status: RESOLVED | Noted: 2017-09-07 | Resolved: 2017-09-14

## 2017-09-14 PROBLEM — J96.00 ACUTE RESPIRATORY FAILURE (HCC): Status: RESOLVED | Noted: 2017-09-08 | Resolved: 2017-09-14

## 2017-09-14 LAB
ALBUMIN SERPL BCP-MCNC: 2.9 G/DL (ref 3.2–4.9)
ALBUMIN/GLOB SERPL: 0.9 G/DL
ALP SERPL-CCNC: 76 U/L (ref 30–99)
ALT SERPL-CCNC: 34 U/L (ref 2–50)
ANION GAP SERPL CALC-SCNC: 8 MMOL/L (ref 0–11.9)
ANISOCYTOSIS BLD QL SMEAR: ABNORMAL
AST SERPL-CCNC: 12 U/L (ref 12–45)
BASOPHILS # BLD AUTO: 2.6 % (ref 0–1.8)
BASOPHILS # BLD: 0.22 K/UL (ref 0–0.12)
BILIRUB SERPL-MCNC: 0.6 MG/DL (ref 0.1–1.5)
BUN SERPL-MCNC: 18 MG/DL (ref 8–22)
BURR CELLS BLD QL SMEAR: NORMAL
CALCIUM SERPL-MCNC: 7.3 MG/DL (ref 8.5–10.5)
CHLORIDE SERPL-SCNC: 107 MMOL/L (ref 96–112)
CO2 SERPL-SCNC: 20 MMOL/L (ref 20–33)
CREAT SERPL-MCNC: 1.28 MG/DL (ref 0.5–1.4)
EOSINOPHIL # BLD AUTO: 0.15 K/UL (ref 0–0.51)
EOSINOPHIL NFR BLD: 1.8 % (ref 0–6.9)
ERYTHROCYTE [DISTWIDTH] IN BLOOD BY AUTOMATED COUNT: 47.8 FL (ref 35.9–50)
GFR SERPL CREATININE-BSD FRML MDRD: 54 ML/MIN/1.73 M 2
GLOBULIN SER CALC-MCNC: 3.2 G/DL (ref 1.9–3.5)
GLUCOSE BLD-MCNC: 164 MG/DL (ref 65–99)
GLUCOSE SERPL-MCNC: 112 MG/DL (ref 65–99)
HCT VFR BLD AUTO: 30.1 % (ref 42–52)
HGB BLD-MCNC: 10.2 G/DL (ref 14–18)
LYMPHOCYTES # BLD AUTO: 1.72 K/UL (ref 1–4.8)
LYMPHOCYTES NFR BLD: 20.2 % (ref 22–41)
MACROCYTES BLD QL SMEAR: ABNORMAL
MANUAL DIFF BLD: NORMAL
MCH RBC QN AUTO: 32.1 PG (ref 27–33)
MCHC RBC AUTO-ENTMCNC: 33.9 G/DL (ref 33.7–35.3)
MCV RBC AUTO: 94.7 FL (ref 81.4–97.8)
MONOCYTES # BLD AUTO: 0.3 K/UL (ref 0–0.85)
MONOCYTES NFR BLD AUTO: 3.5 % (ref 0–13.4)
MORPHOLOGY BLD-IMP: NORMAL
NEUTROPHILS # BLD AUTO: 6.11 K/UL (ref 1.82–7.42)
NEUTROPHILS NFR BLD: 71.9 % (ref 44–72)
NRBC # BLD AUTO: 0 K/UL
NRBC BLD AUTO-RTO: 0 /100 WBC
PLATELET # BLD AUTO: 149 K/UL (ref 164–446)
PMV BLD AUTO: 12.8 FL (ref 9–12.9)
POIKILOCYTOSIS BLD QL SMEAR: NORMAL
POTASSIUM SERPL-SCNC: 3.6 MMOL/L (ref 3.6–5.5)
PROT SERPL-MCNC: 6.1 G/DL (ref 6–8.2)
RBC # BLD AUTO: 3.18 M/UL (ref 4.7–6.1)
RBC BLD AUTO: PRESENT
SCHISTOCYTES BLD QL SMEAR: NORMAL
SODIUM SERPL-SCNC: 135 MMOL/L (ref 135–145)
WBC # BLD AUTO: 8.5 K/UL (ref 4.8–10.8)

## 2017-09-14 PROCEDURE — 71010 DX-CHEST-PORTABLE (1 VIEW): CPT

## 2017-09-14 PROCEDURE — 80053 COMPREHEN METABOLIC PANEL: CPT

## 2017-09-14 PROCEDURE — 82962 GLUCOSE BLOOD TEST: CPT

## 2017-09-14 PROCEDURE — 36415 COLL VENOUS BLD VENIPUNCTURE: CPT

## 2017-09-14 PROCEDURE — 85007 BL SMEAR W/DIFF WBC COUNT: CPT

## 2017-09-14 PROCEDURE — 700111 HCHG RX REV CODE 636 W/ 250 OVERRIDE (IP): Performed by: INTERNAL MEDICINE

## 2017-09-14 PROCEDURE — 700102 HCHG RX REV CODE 250 W/ 637 OVERRIDE(OP): Performed by: HOSPITALIST

## 2017-09-14 PROCEDURE — 700105 HCHG RX REV CODE 258: Performed by: INTERNAL MEDICINE

## 2017-09-14 PROCEDURE — 99239 HOSP IP/OBS DSCHRG MGMT >30: CPT | Performed by: HOSPITALIST

## 2017-09-14 PROCEDURE — 85027 COMPLETE CBC AUTOMATED: CPT

## 2017-09-14 PROCEDURE — A9270 NON-COVERED ITEM OR SERVICE: HCPCS | Performed by: HOSPITALIST

## 2017-09-14 RX ORDER — OXYCODONE HYDROCHLORIDE 5 MG/1
5 TABLET ORAL EVERY 4 HOURS PRN
Qty: 30 TAB | Refills: 0 | Status: ON HOLD | OUTPATIENT
Start: 2017-09-14 | End: 2017-09-24

## 2017-09-14 RX ORDER — LEVOFLOXACIN 500 MG/1
500 TABLET, FILM COATED ORAL DAILY
Qty: 10 TAB | Refills: 0 | Status: ON HOLD | OUTPATIENT
Start: 2017-09-14 | End: 2017-10-01

## 2017-09-14 RX ADMIN — STANDARDIZED SENNA CONCENTRATE AND DOCUSATE SODIUM 2 TABLET: 8.6; 5 TABLET, FILM COATED ORAL at 09:32

## 2017-09-14 RX ADMIN — INSULIN LISPRO 3 UNITS: 100 INJECTION, SOLUTION INTRAVENOUS; SUBCUTANEOUS at 06:39

## 2017-09-14 RX ADMIN — LEVOTHYROXINE SODIUM 125 MCG: 125 TABLET ORAL at 06:39

## 2017-09-14 RX ADMIN — CEFTRIAXONE 2 G: 2 INJECTION, POWDER, FOR SOLUTION INTRAMUSCULAR; INTRAVENOUS at 09:32

## 2017-09-14 ASSESSMENT — PAIN SCALES - GENERAL: PAINLEVEL_OUTOF10: 4

## 2017-09-14 NOTE — WOUND TEAM
Wound consult placed for callous first met head left foot.  Patient seen last week for this callous and notes written.  Today callous is loose so using a scalpel and forceps removed 100% callous and dried bloody area under callous revealing a pin point wound that requires no care.  Instructed patient again to follow up with podiatrist after discharge for referral to an orthotist for off loading shoes and/or insoles to prevent callous from re occurring.  He verbalized understanding to instructions.  Discussed with staff RN.

## 2017-09-14 NOTE — DISCHARGE INSTRUCTIONS
Discharge Instructions    Discharged to home by car with relative. Discharged via wheelchair, hospital escort: Yes.  Special equipment needed: Wheelchair    Be sure to schedule a follow-up appointment with your primary care doctor or any specialists as instructed.     Discharge Plan:   Diet Plan: Discussed  Activity Level: Discussed  Confirmed Follow up Appointment: Patient to Call and Schedule Appointment  Confirmed Symptoms Management: Discussed  Medication Reconciliation Updated: Yes  Influenza Vaccine Indication: Indicated: 65 years and older  Influenza Vaccine Given - only chart on this line when given: Influenza Vaccine Given (See MAR)    I understand that a diet low in cholesterol, fat, and sodium is recommended for good health. Unless I have been given specific instructions below for another diet, I accept this instruction as my diet prescription.   Other diet: Diabetic    Special Instructions: Resume xarelto when cleared by GI.    · Is patient discharged on Warfarin / Coumadin?   No     · Is patient Post Blood Transfusion?  No    Depression / Suicide Risk    As you are discharged from this [facility] facility, it is important to learn how to keep safe from harming yourself.    Recognize the warning signs:  · Abrupt changes in personality, positive or negative- including increase in energy   · Giving away possessions  · Change in eating patterns- significant weight changes-  positive or negative  · Change in sleeping patterns- unable to sleep or sleeping all the time   · Unwillingness or inability to communicate  · Depression  · Unusual sadness, discouragement and loneliness  · Talk of wanting to die  · Neglect of personal appearance   · Rebelliousness- reckless behavior  · Withdrawal from people/activities they love  · Confusion- inability to concentrate     If you or a loved one observes any of these behaviors or has concerns about self-harm, here's what you can do:  · Talk about it- your feelings and  reasons for harming yourself  · Remove any means that you might use to hurt yourself (examples: pills, rope, extension cords, firearm)  · Get professional help from the community (Mental Health, Substance Abuse, psychological counseling)  · Do not be alone:Call your Safe Contact- someone whom you trust who will be there for you.  · Call your local CRISIS HOTLINE 018-3957 or 261-298-2992  · Call your local Children's Mobile Crisis Response Team [place] (791) 712-7047 or wwwTownSquared  · Call the toll free National Suicide Prevention Hotlines   · National Suicide Prevention Lifeline 570-753-NCRM (6956)  · National Hope Line Network 800-SUICIDE (070-4302)        Biliary Drainage Catheter Home Guide  A biliary drainage catheter is a tube that is inserted through your skin into the bile ducts in your liver. The purpose of a biliary drainage catheter is to prevent backup of bile into the liver. Bile is a thick yellow or green fluid that helps digest fat in foods. Backup of bile can occur when there is a blockage preventing bile from moving from the bile ducts into your small intestine, as it normally should. This can occur from a tumor, gallstones, or scar tissue. There are three types of biliary drainage:  · External biliary drainage--With this type, bile is only drained into a collection bag outside your body (external collection bag).  · Internal-external biliary drainage--Bile is drained to an external collection bag as well as into your small intestine.  · Internal biliary drainage--Bile is only drained into your small intestine.  HOW DO I CHANGE MY DRESSING?  The dressing over the drain should be changed at least every other day or more frequently if needed to keep the dressing dry.   2. Wash your hands with soap and water.  3. Gently remove the old dressing. Avoid using scissors to remove the dressing because this may lead to accidental damage to the drain.  4. Once the dressing is removed, inspect the  skin around the drain for redness, swelling, and foul smelling yellow or green discharge.  5. If the drain was sutured to the skin, inspect the suture to verify that it is still anchored in the skin.  6. Clean the skin around the insertion site with mild soap and warm water. Pat the area dry with a clean cloth.  7. Do not apply creams, ointments, or alcohol to the site. Allow the skin to air dry completely before applying a new dressing.  8. Use a drain sponge (4x4 split gauze) and place the drain through the slit. Cover the drain and the first gauze with a 4x4 gauze. The drain should rest on the gauze and not on the skin.  9. Tape the dressing to the skin.  10. Wash your hands with soap and water.  HOW DO I FLUSH A DRAIN NOT ATTACHED TO A BAG?  Biliary drains should be flushed daily unless you are instructed otherwise by a health care provider. The end of the drain is closed using an IV cap to which a syringe can be directly connected.   2. Clean the IV cap with an alcohol swab and then screw the tip of a 10 ml normal saline syringe onto the IV cap.  3. Inject the saline over 5-10 seconds. If you feel resistance while injecting, stop immediately.  4. Remove the syringe from the cap.  HOW DO I ATTACH A BAG TO MY DRAIN?  If you are having trouble with your biliary drain, you may be directed by your health care provider to use bag drainage until you can be seen to fix the problem. You should always have a drainage bag and connecting tubing at home for this reason. If you do not, remember to ask for these at your next appointment.   2. Remove the bag and connecting tubing from their packaging.  3. Connect the funnel end of the tubing to the bag's cone-shaped stem.  4. Remove the IV cap from the biliary drain by unscrewing it and replace it with the screw-on end of the tubing.  5. Save the IV cap in a sealable plastic storage bag.  HOW DO I EMPTY MY DRAINAGE BAG?  The drainage bag should be emptied when it becomes 2/3  full or before you go to sleep. Most drainage bags have a drainage valve at the bottom that allows them to be emptied easily.  2. Hold the bag over the toilet or basin (or measuring container, if you are directed to measure the drainage).  3. Unscrew the valve to open it, and allow the bag to drain.  4. Close the valve securely to avoid leakage, and wipe it clean with a tissue or disposable napkin.  SEEK MEDICAL CARE IF:  · Your pain gets worse after an initial improvement.  · You have any questions about your tube.  · Your redness, soreness, or swelling at the tube insertion site gets worse despite good cleaning.  · Your skin breaks down around the tube.  · You have leakage of bile around the tube.  · Your tube becomes blocked or clogged.  · Your catheter is dislodged or comes out.  · You have a fever.  · You have chills or increased pain.  MAKE SURE YOU:  · Understand these instructions.  · Will watch your condition.  · Will get help right away if you are not doing well or get worse.     This information is not intended to replace advice given to you by your health care provider. Make sure you discuss any questions you have with your health care provider.     Document Released: 10/08/2014 Document Revised: 12/23/2014 Document Reviewed: 10/08/2014  [employer] Interactive Patient Education ©2016 [employer]      Biliary Drainage Catheter Placement  A biliary drainage catheter is a tube that is inserted through your skin into the bile ducts in your liver. This is sometimes called a percutaneous transhepatic biliary drainage catheter. The purpose of a biliary drainage catheter is to keep bile from backing up into the liver. Bile is a thick yellow or green fluid that helps digest fat in foods. Backup of bile can occur when there is a blockage stopping bile from moving from the bile ducts into your small intestine, as it normally should. This can occur from a tumor or scar tissue. There are three types of biliary  drainage. These are:   · External biliary drainage. This is where bile is only drained into a collection bag outside your body (external collection bag).    · Internal-external biliary drainage. This is where bile is drained to both an outside collection bag as well as into your small intestine.    · Internal biliary drainage. This is where bile is only drained into your small intestine.    LET YOUR HEALTH CARE PROVIDER KNOW ABOUT:  · Any allergies you have.  · All medicines you are taking, including vitamins, herbs, eye drops, creams, and over-the-counter medicines.  · Previous problems you or members of your family have had with the use of anesthetics.  · Any blood disorders you have.  · Previous surgeries you have had.  · Medical conditions you have.  · Possibility of pregnancy, if this applies.  RISKS AND COMPLICATIONS  Generally, this is a safe procedure. However, as with any procedure, problems can occur. Possible problems include:  · Bleeding.  · Infection.  · Injury to the surrounding structures, such as the liver, bile ducts, intestines, or stomach.  · Bile leak.  BEFORE THE PROCEDURE  · Your health care provider may want you to have blood tests. These tests can show how well your kidneys and liver are working. They can also show how well your blood clumps together (clots).    · If you take blood thinners (anticoagulants), ask your health care provider about if and when you should stop taking them.    · Make arrangements for someone to drive you home after the procedure.      PROCEDURE  · A tube called an IV catheter will be inserted into one of your veins. Medicine will be able to flow right into your body through this catheter. You may be given medicine through this tube to help prevent nausea, pain, or infection.    · You will be given a medicine that makes you go to sleep (general anesthetic).    · The biliary drainage catheter, which is thin and flexible, will be inserted into a needle.    · The needle  will be inserted into your body and guided to your liver bile duct, with the help of an imaging method that uses X-ray images (fluoroscopy). Depending on the type of biliary drainage catheter your health care provider inserts, the catheter may be advanced to your small intestine.    · A dye will be injected through the catheter. Then, X-rays will be taken. This helps to produce pictures of the bile ducts.    · The catheter is then left in place with the help of a stitch placed into your skin and around the tube. If an external or internal-external biliary drainage system is inserted, the catheter will exit your body and be connected to a drainage bag. If an internal biliary drainage system is inserted, the catheter exiting your body will temporarily remain in place until its location is confirmed by X-ray.    · The catheter will be removed once the placement of the biliary drainage system is confirmed.    AFTER THE PROCEDURE  · You will stay in a recovery area until the general anesthetic has worn off. Your blood pressure and pulse will be checked.    · You may have discomfort at the drain site. You will be given pain medicines to control the pain.  · You will be instructed on how to empty and care for the drain.  · You will need to remain lying down for several hours.      This information is not intended to replace advice given to you by your health care provider. Make sure you discuss any questions you have with your health care provider.     Document Released: 10/08/2014 Document Revised: 12/23/2014 Document Reviewed: 10/08/2014  [employer] Interactive Patient Education ©2016 [employer]    Cholecystitis  Cholecystitis is an inflammation of your gallbladder. It is usually caused by a buildup of gallstones or sludge (cholelithiasis) in your gallbladder. Your gallbladder stores a fluid that helps digest fats (bile). Cholecystitis is serious and needs treatment right away.  CAUSES   · Gallstones. Gallstones can  block the tube that leads to your gallbladder, causing bile to build up. As bile builds up, your gallbladder becomes inflamed.  · Bile duct problems, such as blockage from scarring or kinking.  · Tumors. Tumors can stop bile from leaving your gallbladder correctly, causing bile to build up. As bile builds up, your gallbladder becomes inflamed.  SYMPTOMS  · Nausea.  · Vomiting.  · Abdominal pain, especially in the upper right area of your abdomen.  · Abdominal tenderness or bloating.  · Sweating.  · Chills.  · Fever.  · Yellowing of your skin and the whites of your eyes (jaundice).  DIAGNOSIS  Your health care provider may order blood tests to look for infection or gallbladder problems. Your health care provider may also order imaging tests, such as an ultrasound or MRI. If necessary, more imaging tests may be ordered. Further tests may include a hepatobiliary iminodiacetic acid (HIDA) scan. This scan allows your health care provider to see the bile move from your liver to your gallbladder and to your small intestine. Your health care provider may perform liver function tests if gallstones are suspected.  TREATMENT  A hospital stay is usually necessary to lessen the inflammation of your gallbladder. You may be required to not eat or drink (fast) for a certain amount of time. You may be given medicine to treat pain or an antibiotic medicine to treat an infection. Surgery may be needed to remove your gallbladder (cholecystectomy) once the inflammation has gone down. Surgery may be needed right away if you develop complications, such as a tear (perforation) of the gallbladder or death of gallbladder tissue (gangrene).  HOME CARE INSTRUCTIONS  Home care will depend on your treatment. In general:  · If you were given antibiotics, take them as directed. Finish them even if you start to feel better.  · Only take over-the-counter or prescription medicines for pain, discomfort, or fever as directed by your health care  provider.  · Follow a low-fat diet until you see your health care provider again.  · Keep all follow-up visits as directed by your health care provider.  · Avoid eating or drinking anything that triggers your symptoms.  SEEK IMMEDIATE MEDICAL CARE IF:  · Your pain is increasing and is not controlled by medicines.  · Your pain moves to another part of your abdomen or to your back.  · You have a fever.  · You have nausea and vomiting.  · You continue to have symptoms or develop new symptoms even with treatment.  MAKE SURE YOU:  · Understand these instructions.  · Will watch your condition.  · Will get help right away if you are not doing well or get worse.     This information is not intended to replace advice given to you by your health care provider. Make sure you discuss any questions you have with your health care provider.     Document Released: 12/18/2006 Document Revised: 01/08/2016 Document Reviewed: 03/30/2016  [employer] Interactive Patient Education ©2016 [employer]

## 2017-09-14 NOTE — PROGRESS NOTES
Received bedside report and assumed care of patient.  Assessment complete; discussed POC with patient.  AO x4, patient calls for assistance.  Patient appears calm and exhibits no signs of distress.  Patient reports pain of 3/10, declines interventions.  Patient tolerating current diet.  BS normoactive x 4, LBM 9/8/17, but pt reports flatus, patient voids ambulating to BR or PRN with urinal.  Patient is standby assist with FWW, gait slow but steady.  Call light within reach, bed in lowest position, SCDs not in use, bed alarm in use.  Planning discharge to home today per order.  Pt to contact wife to  pt for ride back home to [place].

## 2017-09-14 NOTE — CARE PLAN
Problem: Safety  Goal: Will remain free from injury  Updated about POC. Reinforce call light use. Pt acknowledged understanding    Problem: Respiratory:  Goal: Respiratory status will improve  Encouraged IS use. Pulls about 1500ml. Weaned down to RA    Problem: Mobility  Goal: Risk for activity intolerance will decrease  Encouraged ambulation. Up to chair most of the day.

## 2017-09-14 NOTE — PROGRESS NOTES
Bedside report completed, assumed pt care. Pt sitting comfortably in chair, chair alarm in place. A&Ox4. Assessment complete. States 4/10 pain to back, medicated per MAR. IR drain to abdomen RUQ, moderate brown/red drainage, dressing CDI. Skin tear to RUE, mepitel one dressing in place, CDI. Clear lung sounds throughout, IS encouraged, pulling 1500. 90% on 1L O2 NC. Denies chest pain and shortness of breath. Abdomen semi-firm, tender upon palpation, hypoactive bowel sounds. Last BM: 9/8/2017. + Voiding. Tolerating regular diabetic diet, denies n/v and numbess/tingling.   Discussed plan of care with pt. Call light within reach, possessions within reach, treaded socks on, floor free from trip hazard, Hourly rounding in place.

## 2017-09-14 NOTE — CARE PLAN
Problem: Safety  Goal: Will remain free from falls  Outcome: PROGRESSING AS EXPECTED  Reminded patient to use call light when ambulating in order to prevent falls.  Treaded socks on, bed alarm in use, call light within reach, bed in lowest position, upper bed rails up.    Problem: Infection  Goal: Will remain free from infection  Outcome: PROGRESSING AS EXPECTED  Reminded patient of the signs and symptoms of infection and encouraged patient to report any of these findings in order to promote best outcomes.

## 2017-09-15 NOTE — DISCHARGE SUMMARY
CHIEF COMPLAINT ON ADMISSION  Chief Complaint   Patient presents with   • Syncope     In bathroom this AM had syncopal event unwitnessed. Slid down wall.    • LLQ Pain     started this AM. Got up to go to the bathroom prior to sycopal event       CODE STATUS  Prior    HPI & HOSPITAL COURSE  83 y.o. male who presented 9/7/2017 with A syncopal episode this morning when he was at home. He was seen at an outside hospital and subsequent workup showed evidence for liver failure. Further workup including MRCP revealed evidence for common bile duct stone and the patient was transferred here for a higher level of care. He is to be admitted with evidence of sepsis, cholangitis, and a common bile duct stone.     Seen by dr swift. Patient not a candidate for ERCP due to hx of gastric bypass. Patient referred to IR for biliary drain    Hospital course complication by respiratory failure which required monitoring and management in ICU. Dr lala of [facility] was consulted. He was receiving broad spectrum abx.     On 9/12 , patient underwent second IR procedure and stone was pushed into jejenum.    Out of ICU on 9/13. He was stable  for dc on 9/14.    Therefore, he is discharged in fair and stable condition with close outpatient follow-up.    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  [facility]  [place] [address]  [phone]    Schedule an appointment as soon as possible for a visit in 2 days      Myron Haines M.D.  [address] [place] [address] [place] [address]  [phone]            MEDICATIONS ON DISCHARGE   Naveed Tamayo   Home Medication Instructions CHARLES:[id number]    Printed on:09/15/17 1311   Medication Information                      Cholecalciferol (VITAMIN D PO)  Take  by mouth every day.             insulin detemir (LEVEMIR FLEXPEN) 100 UNIT/ML SOPN injection  Inject 8 Units as instructed every morning.             levofloxacin (LEVAQUIN) 500 MG tablet  Take 1 Tab by mouth every day.             levothyroxine (SYNTHROID) 125 MCG TABS  Take 1 Tab by mouth Every morning on an empty stomach.             oxycodone immediate-release (ROXICODONE) 5 MG Tab  Take 1 Tab by mouth every four hours as needed.             rivaroxaban (XARELTO) 10 MG Tab tablet  Take 5 mg by mouth every morning.                 DIET  No orders of the defined types were placed in this encounter.      ACTIVITY  As tolerated.  Weight bearing as tolerated      CONSULTATIONS  Gi dr bruno  pma dr lala  IR dr white      PROCEDURES  9/9 Procedure(s): PTC AND BILIARY DRAIN    9/12 Procedure(s): Angioplasty of of choledocojejunostomy anastomoses and push CBD stone into jejunum  Upsize int/ext stent to 12Fr       LABORATORY  Lab Results   Component Value Date/Time    SODIUM 135 09/14/2017 03:33 AM    POTASSIUM 3.6 09/14/2017 03:33 AM    CHLORIDE 107 09/14/2017 03:33 AM    CO2 20 09/14/2017 03:33 AM    GLUCOSE 112 (H) 09/14/2017 03:33 AM    BUN 18 09/14/2017 03:33 AM    CREATININE 1.28 09/14/2017 03:33 AM        Lab Results   Component Value Date/Time    WBC 8.5 09/14/2017 03:33 AM    HEMOGLOBIN 10.2 (L) 09/14/2017 03:33 AM    HEMATOCRIT 30.1 (L) 09/14/2017 03:33 AM    PLATELETCT  149 (L) 09/14/2017 03:33 AM        Total time of the discharge process exceeds 38 minutes

## 2017-09-23 LAB — EKG IMPRESSION: NORMAL

## 2017-09-24 ENCOUNTER — HOSPITAL ENCOUNTER (INPATIENT)
Facility: MEDICAL CENTER | Age: 82
LOS: 7 days | DRG: 919 | End: 2017-10-01
Attending: FAMILY MEDICINE | Admitting: FAMILY MEDICINE
Payer: MEDICARE

## 2017-09-24 ENCOUNTER — HOSPITAL ENCOUNTER (OUTPATIENT)
Facility: MEDICAL CENTER | Age: 82
End: 2017-09-24
Payer: MEDICARE

## 2017-09-24 ENCOUNTER — RESOLUTE PROFESSIONAL BILLING HOSPITAL PROF FEE (OUTPATIENT)
Dept: HOSPITALIST | Facility: MEDICAL CENTER | Age: 82
End: 2017-09-24
Payer: MEDICARE

## 2017-09-24 LAB
ALBUMIN SERPL BCP-MCNC: 4 G/DL (ref 3.2–4.9)
ALBUMIN/GLOB SERPL: 1.1 G/DL
ALP SERPL-CCNC: 109 U/L (ref 30–99)
ALT SERPL-CCNC: 10 U/L (ref 2–50)
ANION GAP SERPL CALC-SCNC: 11 MMOL/L (ref 0–11.9)
AST SERPL-CCNC: 13 U/L (ref 12–45)
BASOPHILS # BLD AUTO: 0.6 % (ref 0–1.8)
BASOPHILS # BLD: 0.07 K/UL (ref 0–0.12)
BILIRUB SERPL-MCNC: 0.9 MG/DL (ref 0.1–1.5)
BUN SERPL-MCNC: 25 MG/DL (ref 8–22)
CALCIUM SERPL-MCNC: 9.2 MG/DL (ref 8.5–10.5)
CHLORIDE SERPL-SCNC: 104 MMOL/L (ref 96–112)
CO2 SERPL-SCNC: 19 MMOL/L (ref 20–33)
CREAT SERPL-MCNC: 1.48 MG/DL (ref 0.5–1.4)
EOSINOPHIL # BLD AUTO: 0.06 K/UL (ref 0–0.51)
EOSINOPHIL NFR BLD: 0.6 % (ref 0–6.9)
ERYTHROCYTE [DISTWIDTH] IN BLOOD BY AUTOMATED COUNT: 47.6 FL (ref 35.9–50)
GFR SERPL CREATININE-BSD FRML MDRD: 45 ML/MIN/1.73 M 2
GLOBULIN SER CALC-MCNC: 3.8 G/DL (ref 1.9–3.5)
GLUCOSE SERPL-MCNC: 72 MG/DL (ref 65–99)
HCT VFR BLD AUTO: 32.3 % (ref 42–52)
HGB BLD-MCNC: 10.8 G/DL (ref 14–18)
IMM GRANULOCYTES # BLD AUTO: 0.04 K/UL (ref 0–0.11)
IMM GRANULOCYTES NFR BLD AUTO: 0.4 % (ref 0–0.9)
INR PPP: 0.98 (ref 0.87–1.13)
LYMPHOCYTES # BLD AUTO: 1.98 K/UL (ref 1–4.8)
LYMPHOCYTES NFR BLD: 18.2 % (ref 22–41)
MCH RBC QN AUTO: 31.9 PG (ref 27–33)
MCHC RBC AUTO-ENTMCNC: 33.4 G/DL (ref 33.7–35.3)
MCV RBC AUTO: 95.3 FL (ref 81.4–97.8)
MONOCYTES # BLD AUTO: 1.16 K/UL (ref 0–0.85)
MONOCYTES NFR BLD AUTO: 10.7 % (ref 0–13.4)
NEUTROPHILS # BLD AUTO: 7.56 K/UL (ref 1.82–7.42)
NEUTROPHILS NFR BLD: 69.5 % (ref 44–72)
NRBC # BLD AUTO: 0 K/UL
NRBC BLD AUTO-RTO: 0 /100 WBC
PLATELET # BLD AUTO: 320 K/UL (ref 164–446)
PMV BLD AUTO: 11.7 FL (ref 9–12.9)
POTASSIUM SERPL-SCNC: 3.7 MMOL/L (ref 3.6–5.5)
PROT SERPL-MCNC: 7.8 G/DL (ref 6–8.2)
PROTHROMBIN TIME: 13.3 SEC (ref 12–14.6)
RBC # BLD AUTO: 3.39 M/UL (ref 4.7–6.1)
SODIUM SERPL-SCNC: 134 MMOL/L (ref 135–145)
WBC # BLD AUTO: 10.9 K/UL (ref 4.8–10.8)

## 2017-09-24 PROCEDURE — 700105 HCHG RX REV CODE 258: Performed by: FAMILY MEDICINE

## 2017-09-24 PROCEDURE — 85610 PROTHROMBIN TIME: CPT

## 2017-09-24 PROCEDURE — 99223 1ST HOSP IP/OBS HIGH 75: CPT | Mod: AI | Performed by: FAMILY MEDICINE

## 2017-09-24 PROCEDURE — 700105 HCHG RX REV CODE 258: Performed by: INTERNAL MEDICINE

## 2017-09-24 PROCEDURE — 85025 COMPLETE CBC W/AUTO DIFF WBC: CPT | Mod: 91

## 2017-09-24 PROCEDURE — 80053 COMPREHEN METABOLIC PANEL: CPT | Mod: 91

## 2017-09-24 PROCEDURE — 700102 HCHG RX REV CODE 250 W/ 637 OVERRIDE(OP): Performed by: FAMILY MEDICINE

## 2017-09-24 PROCEDURE — 770020 HCHG ROOM/CARE - TELE (206)

## 2017-09-24 RX ORDER — POLYETHYLENE GLYCOL 3350 17 G/17G
1 POWDER, FOR SOLUTION ORAL
Status: DISCONTINUED | OUTPATIENT
Start: 2017-09-24 | End: 2017-10-01 | Stop reason: HOSPADM

## 2017-09-24 RX ORDER — OXYCODONE HYDROCHLORIDE 5 MG/1
2.5 TABLET ORAL
Status: DISCONTINUED | OUTPATIENT
Start: 2017-09-24 | End: 2017-10-01 | Stop reason: HOSPADM

## 2017-09-24 RX ORDER — LEVOTHYROXINE SODIUM 0.12 MG/1
125 TABLET ORAL
Status: DISCONTINUED | OUTPATIENT
Start: 2017-09-25 | End: 2017-10-01 | Stop reason: HOSPADM

## 2017-09-24 RX ORDER — ONDANSETRON 2 MG/ML
4 INJECTION INTRAMUSCULAR; INTRAVENOUS EVERY 4 HOURS PRN
Status: DISCONTINUED | OUTPATIENT
Start: 2017-09-24 | End: 2017-10-01 | Stop reason: HOSPADM

## 2017-09-24 RX ORDER — SODIUM CHLORIDE 9 MG/ML
INJECTION, SOLUTION INTRAVENOUS CONTINUOUS
Status: DISCONTINUED | OUTPATIENT
Start: 2017-09-24 | End: 2017-09-24

## 2017-09-24 RX ORDER — ONDANSETRON 4 MG/1
4 TABLET, ORALLY DISINTEGRATING ORAL EVERY 4 HOURS PRN
Status: DISCONTINUED | OUTPATIENT
Start: 2017-09-24 | End: 2017-10-01 | Stop reason: HOSPADM

## 2017-09-24 RX ORDER — DEXTROSE AND SODIUM CHLORIDE 5; .9 G/100ML; G/100ML
INJECTION, SOLUTION INTRAVENOUS CONTINUOUS
Status: DISCONTINUED | OUTPATIENT
Start: 2017-09-24 | End: 2017-09-29

## 2017-09-24 RX ORDER — OXYCODONE HYDROCHLORIDE 5 MG/1
5 TABLET ORAL
Status: DISCONTINUED | OUTPATIENT
Start: 2017-09-24 | End: 2017-10-01 | Stop reason: HOSPADM

## 2017-09-24 RX ORDER — AMOXICILLIN 250 MG
2 CAPSULE ORAL 2 TIMES DAILY
Status: DISCONTINUED | OUTPATIENT
Start: 2017-09-24 | End: 2017-10-01 | Stop reason: HOSPADM

## 2017-09-24 RX ORDER — ACETAMINOPHEN 325 MG/1
650 TABLET ORAL EVERY 6 HOURS PRN
Status: DISCONTINUED | OUTPATIENT
Start: 2017-09-24 | End: 2017-10-01 | Stop reason: HOSPADM

## 2017-09-24 RX ORDER — BISACODYL 10 MG
10 SUPPOSITORY, RECTAL RECTAL
Status: DISCONTINUED | OUTPATIENT
Start: 2017-09-24 | End: 2017-10-01 | Stop reason: HOSPADM

## 2017-09-24 RX ORDER — MORPHINE SULFATE 4 MG/ML
2 INJECTION, SOLUTION INTRAMUSCULAR; INTRAVENOUS
Status: DISCONTINUED | OUTPATIENT
Start: 2017-09-24 | End: 2017-10-01 | Stop reason: HOSPADM

## 2017-09-24 RX ADMIN — SODIUM CHLORIDE: 9 INJECTION, SOLUTION INTRAVENOUS at 19:19

## 2017-09-24 RX ADMIN — DEXTROSE AND SODIUM CHLORIDE: 5; 900 INJECTION, SOLUTION INTRAVENOUS at 22:45

## 2017-09-24 ASSESSMENT — COPD QUESTIONNAIRES
DURING THE PAST 4 WEEKS HOW MUCH DID YOU FEEL SHORT OF BREATH: SOME OF THE TIME
DO YOU EVER COUGH UP ANY MUCUS OR PHLEGM?: YES, A FEW DAYS A WEEK OR MONTH
HAVE YOU SMOKED AT LEAST 100 CIGARETTES IN YOUR ENTIRE LIFE: NO/DON'T KNOW
COPD SCREENING SCORE: 4

## 2017-09-24 ASSESSMENT — LIFESTYLE VARIABLES
DO YOU DRINK ALCOHOL: NO
EVER_SMOKED: NEVER

## 2017-09-24 ASSESSMENT — PATIENT HEALTH QUESTIONNAIRE - PHQ9
1. LITTLE INTEREST OR PLEASURE IN DOING THINGS: NOT AT ALL
2. FEELING DOWN, DEPRESSED, IRRITABLE, OR HOPELESS: NOT AT ALL
SUM OF ALL RESPONSES TO PHQ QUESTIONS 1-9: 0
SUM OF ALL RESPONSES TO PHQ9 QUESTIONS 1 AND 2: 0

## 2017-09-24 ASSESSMENT — PAIN SCALES - GENERAL: PAINLEVEL_OUTOF10: 0

## 2017-09-24 NOTE — PROGRESS NOTES
Direct admit from Dr. Meehan at [place]. Dr. Mercer accepting for Pancreatitis. ADT signed and held 09/24/2017 at 1531 and will need to be released upon patient arrival to unit. Patient arriving via ground transport.

## 2017-09-25 ENCOUNTER — APPOINTMENT (OUTPATIENT)
Dept: RADIOLOGY | Facility: MEDICAL CENTER | Age: 82
DRG: 919 | End: 2017-09-25
Attending: HOSPITALIST
Payer: MEDICARE

## 2017-09-25 PROBLEM — R74.8 ELEVATED LIPASE: Status: ACTIVE | Noted: 2017-09-25

## 2017-09-25 LAB
ALBUMIN SERPL BCP-MCNC: 3.3 G/DL (ref 3.2–4.9)
ALBUMIN/GLOB SERPL: 1 G/DL
ALP SERPL-CCNC: 93 U/L (ref 30–99)
ALT SERPL-CCNC: 8 U/L (ref 2–50)
ANION GAP SERPL CALC-SCNC: 9 MMOL/L (ref 0–11.9)
AST SERPL-CCNC: 11 U/L (ref 12–45)
BASOPHILS # BLD AUTO: 0.8 % (ref 0–1.8)
BASOPHILS # BLD: 0.06 K/UL (ref 0–0.12)
BILIRUB SERPL-MCNC: 0.8 MG/DL (ref 0.1–1.5)
BUN SERPL-MCNC: 22 MG/DL (ref 8–22)
CALCIUM SERPL-MCNC: 8.1 MG/DL (ref 8.5–10.5)
CHLORIDE SERPL-SCNC: 107 MMOL/L (ref 96–112)
CO2 SERPL-SCNC: 20 MMOL/L (ref 20–33)
CREAT SERPL-MCNC: 1.37 MG/DL (ref 0.5–1.4)
EOSINOPHIL # BLD AUTO: 0.14 K/UL (ref 0–0.51)
EOSINOPHIL NFR BLD: 1.9 % (ref 0–6.9)
ERYTHROCYTE [DISTWIDTH] IN BLOOD BY AUTOMATED COUNT: 50.2 FL (ref 35.9–50)
GFR SERPL CREATININE-BSD FRML MDRD: 50 ML/MIN/1.73 M 2
GLOBULIN SER CALC-MCNC: 3.3 G/DL (ref 1.9–3.5)
GLUCOSE BLD-MCNC: 112 MG/DL (ref 65–99)
GLUCOSE BLD-MCNC: 159 MG/DL (ref 65–99)
GLUCOSE BLD-MCNC: 182 MG/DL (ref 65–99)
GLUCOSE BLD-MCNC: 214 MG/DL (ref 65–99)
GLUCOSE BLD-MCNC: 230 MG/DL (ref 65–99)
GLUCOSE SERPL-MCNC: 144 MG/DL (ref 65–99)
HCT VFR BLD AUTO: 33.4 % (ref 42–52)
HGB BLD-MCNC: 10.7 G/DL (ref 14–18)
IMM GRANULOCYTES # BLD AUTO: 0.02 K/UL (ref 0–0.11)
IMM GRANULOCYTES NFR BLD AUTO: 0.3 % (ref 0–0.9)
LYMPHOCYTES # BLD AUTO: 2.18 K/UL (ref 1–4.8)
LYMPHOCYTES NFR BLD: 30 % (ref 22–41)
MCH RBC QN AUTO: 30.9 PG (ref 27–33)
MCHC RBC AUTO-ENTMCNC: 32 G/DL (ref 33.7–35.3)
MCV RBC AUTO: 96.5 FL (ref 81.4–97.8)
MONOCYTES # BLD AUTO: 0.65 K/UL (ref 0–0.85)
MONOCYTES NFR BLD AUTO: 9 % (ref 0–13.4)
NEUTROPHILS # BLD AUTO: 4.21 K/UL (ref 1.82–7.42)
NEUTROPHILS NFR BLD: 58 % (ref 44–72)
NRBC # BLD AUTO: 0 K/UL
NRBC BLD AUTO-RTO: 0 /100 WBC
PLATELET # BLD AUTO: 336 K/UL (ref 164–446)
PMV BLD AUTO: 12.5 FL (ref 9–12.9)
POTASSIUM SERPL-SCNC: 3.7 MMOL/L (ref 3.6–5.5)
PROT SERPL-MCNC: 6.6 G/DL (ref 6–8.2)
RBC # BLD AUTO: 3.46 M/UL (ref 4.7–6.1)
SODIUM SERPL-SCNC: 136 MMOL/L (ref 135–145)
WBC # BLD AUTO: 7.3 K/UL (ref 4.8–10.8)

## 2017-09-25 PROCEDURE — 85025 COMPLETE CBC W/AUTO DIFF WBC: CPT

## 2017-09-25 PROCEDURE — 82962 GLUCOSE BLOOD TEST: CPT | Mod: 91

## 2017-09-25 PROCEDURE — A9270 NON-COVERED ITEM OR SERVICE: HCPCS | Performed by: FAMILY MEDICINE

## 2017-09-25 PROCEDURE — 700105 HCHG RX REV CODE 258: Performed by: FAMILY MEDICINE

## 2017-09-25 PROCEDURE — 700111 HCHG RX REV CODE 636 W/ 250 OVERRIDE (IP): Performed by: FAMILY MEDICINE

## 2017-09-25 PROCEDURE — 99233 SBSQ HOSP IP/OBS HIGH 50: CPT | Performed by: HOSPITALIST

## 2017-09-25 PROCEDURE — 700102 HCHG RX REV CODE 250 W/ 637 OVERRIDE(OP): Performed by: FAMILY MEDICINE

## 2017-09-25 PROCEDURE — 36415 COLL VENOUS BLD VENIPUNCTURE: CPT

## 2017-09-25 PROCEDURE — 80053 COMPREHEN METABOLIC PANEL: CPT

## 2017-09-25 PROCEDURE — 770020 HCHG ROOM/CARE - TELE (206)

## 2017-09-25 PROCEDURE — 700105 HCHG RX REV CODE 258: Performed by: INTERNAL MEDICINE

## 2017-09-25 RX ORDER — HEPARIN SODIUM 5000 [USP'U]/ML
5000 INJECTION, SOLUTION INTRAVENOUS; SUBCUTANEOUS EVERY 8 HOURS
Status: DISCONTINUED | OUTPATIENT
Start: 2017-09-26 | End: 2017-10-01 | Stop reason: HOSPADM

## 2017-09-25 RX ADMIN — PIPERACILLIN SODIUM AND TAZOBACTAM SODIUM 3.38 G: 3; .375 INJECTION, POWDER, FOR SOLUTION INTRAVENOUS at 12:16

## 2017-09-25 RX ADMIN — DEXTROSE AND SODIUM CHLORIDE: 5; 900 INJECTION, SOLUTION INTRAVENOUS at 10:34

## 2017-09-25 RX ADMIN — DEXTROSE AND SODIUM CHLORIDE: 5; 900 INJECTION, SOLUTION INTRAVENOUS at 23:17

## 2017-09-25 RX ADMIN — PIPERACILLIN SODIUM AND TAZOBACTAM SODIUM 3.38 G: 3; .375 INJECTION, POWDER, FOR SOLUTION INTRAVENOUS at 23:18

## 2017-09-25 RX ADMIN — PIPERACILLIN SODIUM AND TAZOBACTAM SODIUM 3.38 G: 3; .375 INJECTION, POWDER, FOR SOLUTION INTRAVENOUS at 18:37

## 2017-09-25 RX ADMIN — PIPERACILLIN SODIUM AND TAZOBACTAM SODIUM 3.38 G: 3; .375 INJECTION, POWDER, FOR SOLUTION INTRAVENOUS at 08:41

## 2017-09-25 RX ADMIN — LEVOTHYROXINE SODIUM 125 MCG: 125 TABLET ORAL at 06:17

## 2017-09-25 RX ADMIN — INSULIN LISPRO 2 UNITS: 100 INJECTION, SOLUTION INTRAVENOUS; SUBCUTANEOUS at 12:09

## 2017-09-25 RX ADMIN — PIPERACILLIN SODIUM AND TAZOBACTAM SODIUM 3.38 G: 3; .375 INJECTION, POWDER, FOR SOLUTION INTRAVENOUS at 01:33

## 2017-09-25 RX ADMIN — INSULIN LISPRO 1 UNITS: 100 INJECTION, SOLUTION INTRAVENOUS; SUBCUTANEOUS at 21:09

## 2017-09-25 RX ADMIN — INSULIN LISPRO 2 UNITS: 100 INJECTION, SOLUTION INTRAVENOUS; SUBCUTANEOUS at 18:38

## 2017-09-25 ASSESSMENT — ENCOUNTER SYMPTOMS
VOMITING: 0
BACK PAIN: 0
MYALGIAS: 0
ABDOMINAL PAIN: 1
CHILLS: 0
HEADACHES: 0
DIZZINESS: 0
PALPITATIONS: 0
SHORTNESS OF BREATH: 0
NERVOUS/ANXIOUS: 0
FEVER: 0
TINGLING: 0
NAUSEA: 0
DEPRESSION: 0
COUGH: 0

## 2017-09-25 ASSESSMENT — PAIN SCALES - GENERAL
PAINLEVEL_OUTOF10: 0
PAINLEVEL_OUTOF10: 0

## 2017-09-25 NOTE — H&P
DOS: 9/24/2017    PATIENT ID:  NAME:  Naveed Tamayo  MRN:               [id number]  YOB: 1934    PMD: Myron Haines M.D.    CC: Biliary drain leak    HPI: Naveed Tamayo is a 83 y.o. male who presents with leakage around biliary drain placed 2 weeks ago. Patient states he was sitting in a recliner yesterday and this he was attempting to get up the drain was caught and it was pulled out somewhat. When he woke up the next morning he noticed that his shirt was wet with bile drainage. He then presented to an outside emergency room where CT scan confirms that the drain was pulled out somewhat. His lipase was noted to be more than 2000, is not really complaining of severe abdominal pain, states that the pain is about at baseline since 2 weeks ago. He was admitted here 2 weeks ago for choledocholithiasis and cholangitis, because of a history of gastric bypass, ERCP could not be done in IR biliary drain was placed. He has not had any issues since yesterday. The CT scan also showed the possibly new T11 fracture. He denies any falls is complaining of some mild back pain.                REVIEW OF SYSTEMS  A full review of systems was completed and all pertinent positives and negatives are included in the HPI above by AMA/CMS criteria.    PAST MEDICAL HISTORY  Past Medical History:   Diagnosis Date   • History of peptic ulcer 1960   • Cancer (CMS-HCC)     Prostate   • Diabetes    • Heart murmur        PAST SURGICAL HISTORY  Past Surgical History:   Procedure Laterality Date   • ABDOMINAL EXPLORATION     • CHOLECYSTECTOMY     • CHOLECYSTECTOMY     • CHOLECYSTECTOMY     • GASTRECTOMY     • HERNIA REPAIR      Abdominal   • PROSTATECTOMY, RADICAL RETRO     • THYROIDECTOMY         FAMILY HISTORY  History reviewed. No pertinent family history.    SOCIAL HISTORY  Social History   Substance Use Topics   • Smoking status: Never Smoker   • Smokeless tobacco: Never Used   • Alcohol use No       ALLERGIES  Allergies  as of 09/24/2017 - Reviewed 09/24/2017   Allergen Reaction Noted   • Talwin  12/11/2016       OUTPATIENT MEDICATIONS     Medication List      ASK your doctor about these medications      Instructions   LEVEMIR FLEXPEN 100 UNIT/ML Sopn injection  Generic drug:  insulin detemir   Inject 8 Units as instructed every morning.  Dose:  8 Units     levofloxacin 500 MG tablet  Commonly known as:  LEVAQUIN   Take 1 Tab by mouth every day.  Dose:  500 mg     levothyroxine 125 MCG Tabs  Commonly known as:  SYNTHROID   Take 1 Tab by mouth Every morning on an empty stomach.  Dose:  125 mcg     VITAMIN D PO   Take 1 Cap by mouth every day.  Dose:  1 Cap            PHYSICAL EXAM:  Height 1.829 m (6'), weight 68.6 kg (151 lb 3.8 oz).  Oxygen: O2 (LPM): 0, FIO2%: 21, O2 Delivery: None (Room Air)    Gen: NAD, comfortable  HEENT: NCAT, PERRL, EOMI, Oropharynx moist and clear, no LAD, no JVD, neck supple  Chest: no accessory muscle use, CTAB  CV: RRR, S1 and S2 distinct, no murmur  GI: +BS, soft, NT, ND, no rebound/guarding, no hepatosplenomegaly, biliary drain  Extremities: Warm, well-perfused, no cyanosis/clubbing, no peripheral edema, distal pulses are intact  Neuro: AO x 3, CN II-XII grossly intact, MMT 5/5, no sensory deficits    LAB TESTS and IMAGES:   Recent Labs      09/24/17   1120  09/24/17 1932   WBC  9.0  10.9*   RBC  3.40*  3.39*   HEMOGLOBIN  10.8*  10.8*   HEMATOCRIT  32.8*  32.3*   MCV  96.5*  95.3   MCH  31.8*  31.9   RDW  13.8  47.6   PLATELETCT  253  320   MPV  12.2*  11.7   NEUTSPOLYS   --   69.50   LYMPHOCYTES   --   18.20*   MONOCYTES   --   10.70   EOSINOPHILS   --   0.60   BASOPHILS   --   0.60         Recent Labs      09/24/17 1932   INR  0.98     Recent Labs      09/24/17   1120  09/24/17 1932   SODIUM  131*  134*   POTASSIUM  4.8  3.7   CHLORIDE  101  104   CO2  20*  19*   BUN  29*  25*   CREATININE  1.7*  1.48*   CALCIUM  9.0  9.2   ALBUMIN  3.0*  4.0     Estimated GFR/CRCL = Estimated Creatinine  Clearance: 36.7 mL/min (by C-G formula based on SCr of 1.48 mg/dL).  Recent Labs      09/24/17   1120  09/24/17   1932   GLUCOSE  298*  72     Glycohemoglobin   Date/Time Value Ref Range Status   12/11/2016 03:50 PM 7.1 (H) <6.0 % Final     Comment:     Increased risk for diabetes:  5.7 -6.4%  Diabetes:  >6.4%  Glycemic control for adults with diabetes:  <7.0%  The above interpretations are per ADA guidelines.  Diagnosis  of diabetes mellitus on the basis of elevated Hemoglobin A1c  should be confirmed by repeating the Hb A1c test.     12/14/2013 01:20 PM 7.3 (H) <6.0 % Final     Comment:     Increased risk for diabetes:  5.7 -6.4%  Diabetes:  >6.4%  Glycemic control for adults with diabetes:  <7.0%  The above interpretations are per ADA guidelines.  Diagnosis  of diabetes mellitus on the basis of elevated Hemoglobin A1c  should be confirmed by repeating the Hb A1c test.     Free T-4   Date/Time Value Ref Range Status   12/12/2016 06:56 AM 0.81 0.77 - 1.61 ng/dL Final   12/05/2014 01:00 PM 1.02 0.77 - 1.61 ng/dL Final     Recent Labs      09/24/17   1120  09/24/17   1932   ASTSGOT  17  13   ALTSGPT  18  10   TBILIRUBIN  0.7  0.9   ALKPHOSPHAT  116  109*   GLOBULIN   --   3.8*   INR   --   0.98           Invalid input(s): [id number]  Vitamin B12 -True Cobalamin   Date/Time Value Ref Range Status   12/14/2013 05:00  211 - 911 pg/mL Final     Folate -Folic Acid   Date/Time Value Ref Range Status   12/14/2013 05:00 PM >24.00 >3.1 ng/mL Final     Comment:     FOLATE REFERENCE RANGES  RANGE (ng/mL) EVALUATION  0.35 - 3.37   Deficient  3.38 - 5.38   Indeterminate  >5.38   Normal     Ferritin   Date/Time Value Ref Range Status   12/11/2016 03:50 .0 22.0 - 322.0 ng/mL Final     Iron   Date/Time Value Ref Range Status   12/11/2016 03:50 PM 17 (L) 65 - 175 ug/dL Final     Total Iron Binding   Date/Time Value Ref Range Status   12/11/2016 03:50  250 - 450 ug/dL Final     Ct-head W/o    Result Date:  9/7/2017  HISTORY/REASON FOR EXAM:  Syncope. TECHNIQUE/EXAM DESCRIPTION: CT scan of the brain without contrast. Both automated exposure control and Automated adjustment of tube current based on patient size are utilized. 9/7/2017 7:40 AM. CT scan of the brain was carried out without contrast in the normal manner. Total DLP: 802 mGy*cm COMPARISON: None. FINDINGS: There is small vessel ischemic disease.  There is atrophy commensurate with age. There is no evidence for mass, mass effect, hemorrhage or extra-axial fluid collection.  There is no acute cortical infarction. There is no fracture.     No evidence for acute intracranial abnormality.. Arie Akers MD 9/7/2017 9:35 AM DLP Reporting Thresholds for Incorrect/Repeated Exams - DLP in mGy*cm Head/Neck:  0-year-old 3840, 1-year-old 5880, 5-year-old 8770, 10-year-old 07639 and adult 77008 Head:  0-year-old 4540, 1-year-old 7460, 5-year-old 44190, 10-year-old 42622 and adult 33737 Neck:  0-year-old 2940, 1-year-old 4160, 5-year-old 4550, 10-year-old 6320 and adult 8470 Chest:  0-year-old 550, 1-year-old 830, 5-year-old 1200, 10-year-old 3840 and adult 3570 Abd/pelvis:  0-year-old 440, 1-year-old 720, 5-year-old 1080, 10-year-old 3330 and adult 3330 Trunk(C/A/P):  0-year-old 490, 1-year-old 770, 5-year-old 1140, 10-year-old 3570 and adult 3330    Ct-lspine W/o Plus Recons    Result Date: 9/7/2017  HISTORY/REASON FOR EXAM: Pain Following Trauma. TECHNIQUE/EXAM DESCRIPTION: CT scan lumbar spine. 9/7/2017 8:41 AM. This examination was conducted with Automated Exposure Control and Automated Adjustment of Tube Current based on patient size. Helical imaging is performed through the lumbar spine.  Sagittal and coronal reformatted images are created. Total DLP: 1074 mGy*cm COMPARISON: Chest x-ray of 12/15/2013 FINDINGS: There is a moderate convex right curvature of the lumbar spine centered at the level of L2-L3, with associated degenerative changes. . there is mild  anterior wedge deformity of T11 and T12, which may represent a chronic rather than acute process. Helical correlation is requested. As I do not see comminution of the upper endplate, I believe that the finding may represent a chronic process, although, I would suggest MRI evaluation for more precise evaluation regarding acuity if clinically relevant. There is severe disc space during at L5-S1. Remaining intervertebral disc spaces appear unremarkable. Medial aspects of ribs 10 through 12 appear unremarkable. The spinous processes appear intact. Facet arthrosis is noted throughout the lumbar spine most prominent on the left, but I do not see evidence of facet dislocation. Multilevel degenerative disc bulges result in multilevel foraminal narrowing, which appears moderate to severe at the level of L5-S1, and at least moderate at the level of L4-L5. The transverse processes appear intact. There is moderate arthrosis of the SI joints. Mild atherosclerotic calcification is noted involving the aortoiliac vasculature. Nonobstructive stones are noted involving the becca of the right and left kidneys. The soft tissue structures immediately surrounding the lumbar spine appear grossly unremarkable.     1.  Anterior wedge deformity of T11 and T12 is felt to represent a chronic rather than acute process, although clinical correlation and subsequent MRI imaging is recommended for clarification if indicated. Scoliosis and associated degenerative changes are noted. Degenerative disc bulges throughout the lower lumbar spine do appear to result in narrowing of the neural foramina. Nonobstructive nephrolithiasis is noted bilaterally. DLP Reporting Thresholds for Incorrect/Repeated Exams - DLP in mGy*cm Head/Neck:  0-year-old 3840, 1-year-old 5880, 5-year-old 8770, 10-year-old 01362 and adult 13393 Head:  0-year-old 4540, 1-year-old 7460, 5-year-old 17872, 10-year-old 83705 and adult 34183 Neck:  0-year-old 2940, 1-year-old 4160,  5-year-old 4550, 10-year-old 6320 and adult 8470 Chest:  0-year-old 550, 1-year-old 830, 5-year-old 1200, 10-year-old 3840 and adult 3570 Abd/pelvis:  0-year-old 440, 1-year-old 720, 5-year-old 1080, 10-year-old 3330 and adult 3330 Trunk(C/A/P):  0-year-old 490, 1-year-old 770, 5-year-old 1140, 10-year-old 3570 and adult 3330    Rh-[id number] View    Result Date: 9/24/2017  HISTORY/REASON FOR EXAM: . RIGHT Billiary tube almost out (distal port is outside of body) TECHNIQUE/EXAM DESCRIPTION AND NUMBER OF VIEWS: Abdomen (one view), 9/24/2017 10:50 AM. COMPARISON: None. FINDINGS: Interval placement of a pigtail biliary drain, which projects over the right upper quadrant. Some drain holes appear to project outside the body. Surgical clips are seen in the pelvis, and also the left upper quadrant. Left flank is excluded from view.     Right upper quadrant pigtail biliary drain. Some drainholes project at the very edge of the right flank, and may be external to the patient.    Dx-chest-2 Views    Result Date: 9/7/2017  HISTORY/REASON FOR EXAM: . syncope TECHNIQUE/EXAM DESCRIPTION AND NUMBER OF VIEWS: Chest x-ray (two views), 9/7/2017 7:15 AM. COMPARISON: None. FINDINGS: The lungs are clear.  The cardiomediastinal silhouette is midline and unremarkable.  The costophrenic angles are sharp.     No acute cardiopulmonary disease. Arie Akers MD 9/7/2017 8:32 AM    Dx-chest-portable (1 View)    Result Date: 9/14/2017 9/14/2017 5:58 AM HISTORY/REASON FOR EXAM:  Abnormal finding of lung field. TECHNIQUE/EXAM DESCRIPTION AND NUMBER OF VIEWS: Single portable view of the chest. COMPARISON: 9/13/2017 FINDINGS: Cardiomediastinal contour is unchanged. Interval improvement of bibasilar consolidation. Patchy perihilar infiltrates again seen, more extensive on the RIGHT. Mild blunting of costophrenic angles again noted. No pneumothorax identified. Postoperative change of the LEFT upper abdomen, and lower neck.     1.  Interval  improvement of bibasilar infiltrate and/or atelectasis. 2.  Apparent improvement of pulmonary edema. 3.  No other significant change from prior exam.     Dx-chest-portable (1 View)    Result Date: 9/13/2017 9/13/2017 5:58 AM HISTORY/REASON FOR EXAM:  Abnormal finding of lung field. TECHNIQUE/EXAM DESCRIPTION AND NUMBER OF VIEWS: Single portable view of the chest. COMPARISON: 9/12/2017 FINDINGS: Cardiomediastinal contour is unchanged. Increased opacity at both lung bases with obscuring of the hemidiaphragms and blunting of costophrenic angles. Pulmonary vasculature is again prominent. No pneumothorax identified.     1.  Worsening bibasilar consolidation and small pleural fluid collections. 2.  No other significant change from prior exam.     Dx-chest-portable (1 View)    Result Date: 9/12/2017 9/12/2017 1:31 AM HISTORY/REASON FOR EXAM: Respiratory distress TECHNIQUE/EXAM DESCRIPTION AND NUMBER OF VIEWS: Single portable view of the chest. COMPARISON: Previous date FINDINGS: There are stable bilateral interstitial infiltrates. There is no pleural effusion. The heart is enlarged. There are surgical clips within the upper abdomen.     Stable interstitial infiltrates likely representing pulmonary edema. Stable cardiomegaly.    Dx-chest-portable (1 View)    Result Date: 9/11/2017 9/11/2017 1:53 AM HISTORY/REASON FOR EXAM: . Shortness of breath TECHNIQUE/EXAM DESCRIPTION AND NUMBER OF VIEWS: Single portable view of the chest. COMPARISON: 9/10/2017 FINDINGS: Single portable view of the chest demonstrates stable cardiomegaly and mediastinal contours. Hazy right lung opacification has improved from the prior exam. Interstitial edema does not appear significantly changed. There are likely small layering pleural effusions. No pneumothorax is identified. Surgical clips project over the upper abdomen.     1.  Interval improvement in hazy right lung opacification, possibly related to shifting pleural fluid or improved  atelectasis. 2.  Stable cardiomegaly. 3.  Interstitial edema is not significantly changed.    Dx-chest-portable (1 View)    Result Date: 9/10/2017  9/10/2017 1:10 AM HISTORY/REASON FOR EXAM:  Abnormal finding of lung field. Shortness of breath TECHNIQUE/EXAM DESCRIPTION AND NUMBER OF VIEWS: Single portable view of the chest. COMPARISON: 9/9/2017 FINDINGS: Cardiac mediastinal contour is unchanged. Lungs show hypoinflation. Increasing hazy opacity of RIGHT lung. Pulmonary vessels are prominent. No pneumothorax. No major bony abnormality is seen.     1.  Increasing posterior layering RIGHT pleural effusion. 2.  Pulmonary vascular congestion again seen. 3.  No gross pneumothorax.    Dx-chest-portable (1 View)    Result Date: 9/9/2017 9/9/2017 1:55 AM HISTORY/REASON FOR EXAM:  Dyspnea TECHNIQUE/EXAM DESCRIPTION AND NUMBER OF VIEWS: Single portable view of the chest. COMPARISON: Yesterday FINDINGS: HEART: Stable size. LUNGS: Central vascular congestion and peripheral interstitial prominence, more conspicuous on the RIGHT than the LEFT is unchanged. Lung volumes are low. There are bibasilar opacities. PLEURA: There is blunting of the RIGHT costophrenic sulcus.     1.  Unchanged central vascular congestion and pulmonary edema 2.  Small RIGHT pleural effusion 3.  Bibasilar atelectasis 4.  Persistently enlarged cardiac silhouette    Dx-chest-portable (1 View)    Result Date: 9/8/2017 9/8/2017 10:40 AM HISTORY/REASON FOR EXAM:  Shortness of Breath. TECHNIQUE/EXAM DESCRIPTION AND NUMBER OF VIEWS: Single portable view of the chest. COMPARISON: 9/7/2017 FINDINGS: Cardiac mediastinal contour is unchanged. Lungs show hypoinflation. Pulmonary vessels are prominent with septal lines noted. Increased opacity RIGHT lung base with blunting of costophrenic angle. No pneumothorax. Linear opacity at LEFT lung base. Postoperative change of the lower neck and upper abdomen.     1.  Worsening hypoinflation with bibasilar atelectasis,  pulmonary vascular congestion, mild pulmonary edema and small RIGHT pleural effusion. 2.  Mild cardiomegaly.    Dx-knee Complete 4+ Left    Result Date: 9/7/2017  HISTORY/REASON FOR EXAM:  Pain/Deformity Following Trauma. TECHNIQUE/EXAM DESCRIPTION AND NUMBER OF VIEWS:  LEFT knee, 4 views, 9/7/2017 11:33 AM. COMPARISON: None. FINDINGS: There is no evidence of fracture or dislocation. The bones are osteopenic. Effusion: There is perhaps a very small joint effusion. Joint Spaces: There is mild medial lateral joint space narrowing.     Osteopenia. No fracture seen. Arie Akers MD 9/7/2017 11:45 AM    Kc-ytxprhx-q/o    Result Date: 9/7/2017  HISTORY/REASON FOR EXAM:  Abdomen Pain. History of chronic kidney disease with elevated liver enzymes and abnormal CT finding of severe intra-Medical the dilatation.  There is a question of potential pancreatic mass TECHNIQUE/EXAM DESCRIPTION: MRI of the abdomen MRCP protocol , 9/7/2017 12:37 PM. Multiplanar multisequence imaging of the abdomen was performed in the usual manner. MRCP protocol COMPARISON: 9/7/2017 CT scan FINDINGS: The patient's gallbladder has been removed. As seen on CT scan there is moderate intrahepatic ductal dilatation. The common bile duct measures 1.5 cm. The common bile duct can be followed down to that of the pancreas at this point there appears to be a filling defect identified compatible with an intraductal stone. I would estimate the size of the stone at approximately 10 mm in cephalocaudal by 5 mm in transverse dimensions. It is best seen on the MRCP images #28 of the coronal imaging set. There is serpiginous signal abnormalities in the region of the hepatic hilum. These appear contiguous with the intrahepatic biliary system and I wonder whether this is secondary to cavernous transformation and portal vein occlusion. It is difficult to clearly identify a patent portal vein on this study. There is no definite pancreatic mass lesion identified. The  kidneys and adrenals are grossly unremarkable.     1.  There is moderate intrahepatic and extrahepatic biliary dilatation present. This appears to be due to a distal common bile duct stone measuring approximately 10 x 5 mm in size and best seen on coronal image 28 from MRCP. 2.  Unusual serpiginous relatively high signal structure at the level of the hepatic hilum. This appears contiguous with intrahepatic vasculature and is probably most suggestive of cavernous transformation from portal venous thrombosis however it is very  difficult to assess the portal vein on this study. Further evaluation with gadolinium-enhanced MRI or contrast-enhanced CT is recommended.    Mr-lumbar Spine-w/o    Result Date: 9/7/2017  HISTORY/REASON FOR EXAM:  Low Back Pain. TECHNIQUE/EXAM DESCRIPTION: MRI of the lumbar spine without contrast, 9/7/2017 11:33 AM. MRI of the Lumbar Spine performed without IV Gadolinium in the usual manner. COMPARISON: None. FINDINGS: ALIGNMENT: Alignment is maintained. MARROW SIGNAL: There is very mild Modic type I degenerative endplate edema at L5-S1. There is a hemangioma at the L1 vertebral body level. There is no marrow signal abnormality to suggest fracture. CONUS MEDULLARIS: Normal in signal and location. L1-2:  Mild degenerative changes. No stenosis. L2-3: Mild degenerative changes. No stenosis. L3-4:  Mild degenerative disc disease. Moderate facet arthropathy. Thickening of ligamentum flavum. Moderate central canal stenosis. L4-5:  Mild to moderate degenerative disc disease. Moderate facet arthropathy. Thickening of ligamentum flavum. Moderate central canal stenosis. Mild bilateral neural foraminal stenosis. Superimposed left neural foraminal disc protrusion with mass effect  upon the exiting left L4 nerve root. L5-S1:  Moderate degenerative disc disease. Right paracentral disc protrusion with mild mass effect upon the thecal sac. Mild central canal stenosis. Moderate right neural foraminal stenosis.  Aorta normal in caliber. There is no epidural or paraspinous fluid collection identified.     1.  Degenerative changes lumbar spine as above. This results in central and neural foraminal stenosis as described. 2.   At the L4-5 level there is a left neural foraminal disc protrusion present with mass effect upon the exiting left L4 nerve root. Arie Akers MD 9/7/2017 12:37 PM     Ir-biliary Stent    Result Date: 9/12/2017 9/12/2017 10:17 AM HISTORY/REASON FOR EXAM: .  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 Upsizing internal/external biliary drain to 12 Ethiopian. The procedure was performed using MAXIMAL STERILE BARRIER technique including sterile gown, mask, cap, and donning of sterile gloves following appropriate hand hygiene and/or sterile scrub. Patient skin site was prepped with 2% Chlorhexidine solution. SEDATION: 3 milligrams of versed and 25 micrograms of fentanyl were utilized for IV conscious sedation. 30 minutes of sedation time was utilized for the procedure. The patient was continuously monitored by the sedation nurse. CONTRAST: 65 mL of Omnipaque 300 were utilized for the procedure. FLUOROSCOPY: 11 fluoroscopic images were obtained. 8.5 minutes of fluoroscopic time utilized for the procedure. Following informed consent the patient was prepped and draped in usual fashion 8 mL of 1% lidocaine utilized for local anesthesia. Contrast was injected into the indwelling internal/external biliary drain and multiple digital cholangiographic images were obtained. Subsequently a 9 Ethiopian peel-away sheath was advanced over a Glidewire and positioned in the intrahepatic biliary ductal  system. Next a 9 mm x 4 cm balloon catheter was advanced across the anastomotic stricture at the  choledochojejunostomy. 4 inflations were performed to 6 carleen. Contrast was subsequently injected and digital cholangiogram was performed. Next a Jethro balloon catheter was advanced over the Glidewire inflated and the bile duct stone was pushed through the anastomosis into the jejunum. Next a balloon occlusion cholangiogram was performed. Next 12 Moroccan internal/external biliary drain was advanced over the wire and positioned with its distal loop in the jejunum and proximal sideholes in the intra and extrahepatic biliary ductal system. A termination cholangiogram was then performed. COMPARISON: 9/8/2017 FINDINGS: The initial cholangiogram demonstrated mild intrahepatic biliary ductal dilatation with mild dilatation of the common duct. There was moderate narrowing at the choledocho jejunostomy anastomosis. There was a 6 mm mobile filling defect noted in the common  duct. Following balloon angioplasty of the anastomosis the cholangiogram revealed no significant change in appearance. Following Jethro balloon extraction of the biliary duct stone, the balloon occlusion cholangiogram revealed no evidence of residual filling defects in the biliary ductal system. Following placement of the 12 Moroccan internal/external biliary drain the termination cholangiogram revealed good drainage of the intrahepatic biliary ductal system through the stent into the jejunum.     1.  Initial cholangiogram revealed mild intrahepatic and extrahepatic biliary ductal dilatation with a moderate stricture at the choledochojejunostomy. Additionally there was a 6 mm filling defect in the common hepatic duct. 2.  Following balloon angioplasty of the anastomotic stricture, there is no significant change in the appearance of the stricture. 3.  Jethro balloon was successfully utilized to push the biliary stone through the anastomosis and into the jejunum. 4.  12 Moroccan internal/external biliary drain was placed without incident. 5.  Recommend monthly  upsizing of the internal/external biliary drain to 18 Kyrgyz.    Ir-drain-cholesycstomy    Result Date: 9/8/2017 9/8/2017 8:23 AM HISTORY/REASON FOR EXAM:  Cbd Stone And Hx Of Gastrosjejunostomy Failed Ercp. Needs Ptc for treatment of biliary ductal obstruction. TECHNIQUE: Percutaneous transhepatic cholangiogram with placement of internal/external biliary drain. Following informed consent the patient prepped and draped in usual fashion. 10 mL of 1% lidocaine was utilized for local anesthesia. The procedure was performed utilizing MAXIMUM STERILE BARRIER technique including sterile gown, mass, cap, and done in a sterile gloves following the appropriate hand hygiene and/or sterile scrub. The patient's skin site was prepped with 2% chlorhexidine. CONTRAST: 30 mL's of Omnipaque 300 were utilized for the procedure SEDATION: 1 mgs of Versed and 25 mcgs of Fentanyl were utilized for IV conscious sedation. The entire procedure was monitored continuously by the sedation nurse. Sedation time was 30 minutes. 8 fluoroscopic images obtained. 2.9 minutes minutes of fluoroscopic time were utilized for the procedure. Utilizing fluoroscopic guidance an 21-gauge AccuStick  needle was advanced via a right intercostal approach into a left intrahepatic duct. The 21-gauge AccuStick needle was subsequently upsized to a 4 Kyr[place]z introducer sheath over a 0.18 inch guidewire. Nonionic contrast was injected and multiple digital images over the liver were obtained in various obliquities. Next a Glidewire was advanced into the intrahepatic duct and a 5 Kyr[place]z Kumpe catheter was advanced into the duct. Subsequently the wire and catheter were advanced into the extrahepatic biliary ductal system and ultimately into the duodenum. The Glidewire was exchanged for a Amplatz guidewire which was coiled in the duodenum. Following sequential dilatation an 8 Kyr[place]z locking loop internal/external biliary drain was advanced and positioned with its distal  loop in the duodenum and proximal sideholes in the intra-and extrahepatic biliary ductal system. A final cholangiogram was obtained and the catheter was affixed in place and attached to a bag. The patient tolerated the procedure well was sent to the floor in good condition.     1. Initial percutaneous transhepatic cholangiogram demonstrates marked dilatation of the intra-and extrahepatic biliary ductal system. 2. Successful placement of an internal/external biliary drain with its distal loop in the duodenum and proximal sideholes in the intra-and extrahepatic biliary ductal system.     Le Venous Duplex    Result Date: 2017   Vascular Laboratory  CONCLUSIONS  chronic dvt right thigh and knee  no svt or dvt left leg  no acute process seen in either leg  ANGELA HARVEY  Exam Date:     2017 14:32  Room #:     Inpatient  Priority:     Routine  Ht (in):             Wt (lb):  Ordering Physician:        MYLES JARVIS  Referring Physician:       MYLES JARVIS  Sonographer:               HELEN Ferrer RVT, RDMS  Study Type:                Complete Bilateral  Technical Quality:         Adequate  Age:    83    Gender:     M  MRN:    [id number]  :    1934      BSA:  Indications:     DVT LE-Distal Leg, DVT LE-Prox. Leg  CPT Codes:       62019  ICD Codes:       453.42  453.41  History:         Prior deep vein thrombosis in right leg, 4-5 months ago found                   at outside facility, patient currently on blood thinners.  Limitations:  PROCEDURES:  Bilateral lower extremity venous duplex imaging.  The following venous structures were evaluated: common femoral, profunda  femoral, greater saphenous, femoral, popliteal , peroneal and posterior  tibial veins.  Serial compression, augmentation maneuvers,  color and spectral Doppler  flow evaluations were performed.  FINDINGS:  Right lower extremity:  Chronic thrombus seen from the level of the right distal  superficial  femoral vein through the popliteal vein. Partial color filling seen  throughout these vessels.  Complete color filling and compressibility with normal venous flow dynamics  including spontaneous flow, response to augmentation maneuvers, and  respiratory phasicity seen in remaining vessels.  Left lower extremity:  Complete color filling and compressibility with normal venous flow dynamics  including spontaneous flow, response to augmentation maneuvers, and  respiratory phasicity.  No superficial or deep venous thrombosis.    Luis Morse MD  (Electronically Signed)  Final Date:      11 September 2017                   17:43    Ct Abdomen-pelvis W/o Iv Contrast    Result Date: 9/7/2017  HISTORY/REASON FOR EXAM:  Pain. lower abd pain TECHNIQUE/EXAM DESCRIPTION: CT scan of the abdomen and pelvis without contrast Both automated exposure control and Automated adjustment of tube current based on patient size are utilized. 9/7/2017 8:41 AM. Total DLP: 1074 mGy*cm COMPARISON: None. FINDINGS: Examination is limited by the lack of contrast. Lung bases:  There is mild scarring at the lung bases. The heart is mildly enlarged. There is coronary atherosclerosis. There is no pericardial effusion. There is a small to moderate-sized hiatal hernia. Liver:  Liver parenchyma is normal without definite mass. However, there is moderate to severe intra and extrahepatic biliary dilatation. This can be followed down to the head of the pancreas. Etiology of this finding is not readily apparent from this exam. Gallbladder:  No gallbladder is visualized and presumably has been removed. Spleen:  Spleen appears to have been removed. There are surgical clips in the left upper quadrant. Adrenal glands:  Normal. Pancreas:  Limited evaluation due to lack of IV contrast. It is difficult to exclude mass lesion in the head of the pancreas resulting in the ductal dilatation. Recommend further evaluation. Kidneys:  There is a  nonobstructing 6 mm stone midpole right kidney. There is a nonobstructing 4 x 3 mm stone lower pole left kidney. There is no hydronephrosis. There are no ureteral stones identified. Appendix:  Normal. There is no obstruction. There is no free air. There is no free fluid. There are multiple small bowel loops closely adherent to the intra-abdominal wall and the patient is status post ventral hernia repair. These findings suggest adhesions. Patient is status post prostatectomy. Pelvis:  There is no pelvic mass or fluid collection. The bones are osteopenic. There are moderate degenerative changes L5-S1. No definite osteolytic or osteoblastic lesion is seen. Lymphatics:  No abdominal, pelvic, or retroperitoneal lymphadenopathy.     1.  There is moderate to severe intra and extrahepatic biliary dilatation that can be followed down to the head of the pancreas. Assessment of this finding is limited due to the lack of IV contrast. These findings are certainly concerning even in the face of prior cholecystectomy for mass lesion in the head of the pancreas. Recommend multiphase pre and postcontrast CT imaging with pancreatic protocol. 2.  Bilateral nonobstructing renal calculi. 3.  Coronary atherosclerosis. 4.  Mild cardiomegaly. 5.  Small hiatal hernia. 6.  Degenerative changes lumbar spine as above. 7.  Findings suggesting adhesions. No definite evidence for obstruction. 8.  Status post splenectomy. 9.  Status post prostatectomy. Arie Akers MD 9/7/2017 10:23 AM DLP Reporting Thresholds for Incorrect/Repeated Exams - DLP in mGy*cm Head/Neck:  0-year-old 3840, 1-year-old 5880, 5-year-old 8770, 10-year-old 81441 and adult 29047 Head:  0-year-old 4540, 1-year-old 7460, 5-year-old 59160, 10-year-old 23888 and adult 58658 Neck:  0-year-old 2940, 1-year-old 4160, 5-year-old 4550, 10-year-old 6320 and adult 8470 Chest:  0-year-old 550, 1-year-old 830, 5-year-old 1200, 10-year-old 3840 and adult 3570 Abd/pelvis:  0-year-old  440, 1-year-old 720, 5-year-old 1080, 10-year-old 3330 and adult 3330 Trunk(C/A/P):  0-year-old 490, 1-year-old 770, 5-year-old 1140, 10-year-old 3570 and adult 3330    Ct Abdomen-pelvis With Iv Contrast    Result Date: 9/24/2017  HISTORY/REASON FOR EXAM:  Pain. . TECHNIQUE/EXAM DESCRIPTION: CT scan of the abdomen and pelvis was carried out in the normal manner after the administration of intravenous contrast.9/24/2017 1:06 PM. Total DLP: 969 mGy*cm COMPARISON: CT abdomen and pelvis without contrast 9/7/2017. FINDINGS: Motion artifact moderately degrades some images, particularly the upper abdomen. Heart appears globally somewhat enlarged and there is a small hiatal hernia. Multiple metallic structures in the upper abdomen, which appear to represent surgical clips. By report, patient has history of gastric bypass surgery. Patient is status post splenectomy. Pancreas is not well evaluated because of motion artifact. Main pancreatic duct appears to be dilated, such as seen on axial image #17, measuring up to 7 mm in caliber. Pancreatic head is not clearly identified. Gallbladder is again not identified, and patient is presumably status post cholecystectomy, although no clips are seen in the gallbladder fossa. Interval placement of pigtail drain in the right upper quadrant. Tip placement is difficult to discern with accuracy (because of significant motion artifact), but is near the pancreatic head may be within the common bile duct. The drain extends through the right flank, and at least one drain hole is seen to be external to the patient. Adrenal glands have an unremarkable appearance. There are focal calcifications in the right kidney, near the superior pole, axial image #19 measuring up to 3 mm in size consistent with nephrolithiasis. Punctate hyperdensity in the inferior pole the left kidney, axial image #29. No hydronephrosis in either kidney. Multiple surgical clips in the expected location of the prostate,  consistent with prostatectomy. Trace inflammatory change and possibly a small amount of fluid in the perirectal fat, such as axial image 65. Bladder is moderately distended and has an unremarkable CT appearance. Suggestion of mild rectal wall thickening, not well evaluated given the lack of oral contrast. Colon is difficult to trace, but there is stool throughout the sigmoid:. Appendix is not confidently identified. There is fecal material in a loop of small bowel in the right lower quadrant, which is borderline dilated, measuring 3.0-3.1 cm (see coronal image #29). This is adjacent to a short segment of relative narrowing, axial image #63. However, no evidence of high-grade mechanical small bowel obstruction . Bones are demineralized. New, somewhat jagged linear lucency through the anterior body of T11. Does not clearly extend to the posterior cortex, nor does it appear to involve the pedicles. There is some slight anterior height loss of the T11 body. These findings are new from the recent prior exam performed 9/7/2017. This is seen best on sagittal image #35 and also apparent on coronal image #14. Bones are osteopenic, and there is mild apex right scoliotic curvature of the lumbar spine, with multilevel degenerative changes as well as severe disc height loss L5/S1, with facet arthropathy and osteophytic change. Small calcific density fragments about the greater trochanter of the left femur, possibly related to enthesopathic change (see coronal image #21).     New compression deformity and associated linear fracture line through the anterior T11 vertebral body. Fracture line does not appear to extend to the posterior cortex (see sagittal image #35 and coronal image #14). This is new from the comparison exam performed 7 September 2017. Residual pancreas demonstrates new dilation of the main pancreatic duct, up to 7 mm in caliber, although not well assessed given motion artifact and lack of intravenous contrast.  Correlate clinically for evidence of main pancreatic duct obstruction and/or evolving pancreatitis. Interval placement of a right upper quadrant drain, with at least one drain hole external to the patient. Motion degrades images to the extent that the location of the tip of the drain is difficult to ascertain with certainty, but it appears to be in or near the common bile duct, near the expected location of the pancreatic head. Other chronic findings (including status post splenectomy, probably status post cholecystectomy, and status post prostatectomy; also, postsurgical change of the stomach and pancreas, as well as bilateral nonobstructing nephrolithiasis) similar to recent prior exam. Findings discussed with Dr. Meehan of [facility], via phone, by Dr. Mckinney, 9/24/17 at 2:05 PM. DLP Reporting Thresholds for Incorrect/Repeated Exams - DLP in mGy*cm Head/Neck:  0-year-old 3840, 1-year-old 5880, 5-year-old 8770, 10-year-old 74159 and adult 74255 Head:  0-year-old 4540, 1-year-old 7460, 5-year-old 69900, 10-year-old 90576 and adult 50027 Neck:  0-year-old 2940, 1-year-old 4160, 5-year-old 4550, 10-year-old 6320 and adult 8470 Chest:  0-year-old 550, 1-year-old 830, 5-year-old 1200, 10-year-old 3840 and adult 3570 Abd/pelvis:  0-year-old 440, 1-year-old 720, 5-year-old 1080, 10-year-old 3330 and adult 3330 Trunk(C/A/P):  0-year-old 490, 1-year-old 770, 5-year-old 1140, 10-year-old 3570 and adult 3330       ASSESSMENT/PLAN:     1.   Pancreatitis. I discussed this with vascular gastroenterology Dr. Lloyd, we both suspect the possibility of another stone, as well as inadequate biliary drainage causing elevation of his lipase. We'll keep him nothing by mouth, provide IV fluid hydration, pain control.  2.   Biliary drain. I have a call out to interventional radiology for adjustment or possible need of placement of a new drain the possible exploration of another stone  3.   History of cholangitis. His white count  has come up, I will empirically cover him with Zosyn  4.   Diabetes mellitus. SSI for now  5.   Hypothyroid. Continue Synthroid  6.   CKD stage III. IV fluid hydration, follow BMP  7.   Chronic DVT. He has been off Xarelto for the past 2 weeks    PPX: Will start on Lovenox once we know what the plan of care is with IR    CODE STATUS: DNR, verified and clarified with the patient    Anticipate that patient will need more than 2 midnights for management of the above discussed medical issues.    This dictation was created using voice recognition software. The accuracy of the dictation is limited to the abilities of the software. I expect there may be some errors of grammar and possibly content.

## 2017-09-25 NOTE — CARE PLAN
Problem: Communication  Goal: The ability to communicate needs accurately and effectively will improve  Outcome: PROGRESSING AS EXPECTED  Pt has communicated needs.    Problem: Safety  Goal: Will remain free from injury  Outcome: PROGRESSING AS EXPECTED  Pt has remained free from injury.  Goal: Will remain free from falls  Outcome: PROGRESSING AS EXPECTED  Pt has remained free from falls.    Problem: Venous Thromboembolism (VTW)/Deep Vein Thrombosis (DVT) Prevention:  Goal: Patient will participate in Venous Thrombosis (VTE)/Deep Vein Thrombosis (DVT)Prevention Measures  Outcome: PROGRESSING AS EXPECTED  Pt has participated in VTE and DVT prevention.

## 2017-09-25 NOTE — CARE PLAN
Problem: Safety  Goal: Will remain free from injury    Intervention: Provide assistance with mobility  Patient encouraged to call for assistance

## 2017-09-25 NOTE — ASSESSMENT & PLAN NOTE
He has a history of cholangitis  Given his presentation he has empirically been started on Zosyn therapy  follow GI recommendations

## 2017-09-25 NOTE — ASSESSMENT & PLAN NOTE
Patient had a biliary draina few weeks ago and it became dislodged. Elevated lipase with concern for obstruction. KUB showed malpositioned drain.  - s/p replacement with larger drain  - advance diet as tolerated, currently on chopped  - PT and ambulate with nursing  - continue zosyn  - GI signed off

## 2017-09-25 NOTE — PROGRESS NOTES
Bedside report received on pt. Plan of care discussed with pt. Pt is resting in bed. Call light and pt's belongings within reach. Bed in lowest position and locked.

## 2017-09-25 NOTE — PROGRESS NOTES
Report received from Altagracia FELICIANO. Assumed care of patient. Pt is awake in bed using urinal, blanket provided, updated on plan of care, and denies any other needs at this time.

## 2017-09-25 NOTE — PROGRESS NOTES
Regarding Biliary drain placed by IR, per Dr. Yeager radiology, the drain may be draining internally which could explain decreased external drainage, the patient does not appear sceptic, nor bilirubin increased, which would indicate malfunction of biliary drainage catheter.  If there is still concern for malposition of biliary drain he recommends a diagnostic KUB xray or DX tube check under fluoroscopy to identify location.  Any questions please call [phone].

## 2017-09-25 NOTE — ASSESSMENT & PLAN NOTE
His pain has been the same and stable for the past few weeks  Concern for biliary drainage obstruction vs new stone  GI and IR consulted  NPO for now

## 2017-09-25 NOTE — PROGRESS NOTES
Med rec complete per pt. Pt states, he was taking xarelto and was advised to stop approximately 10 days ago

## 2017-09-25 NOTE — PROGRESS NOTES
Skin check performed with 2nd RN. Skin is dry and flaky, but intact. Sacrum is pink and blanchable.

## 2017-09-25 NOTE — PROGRESS NOTES
[facility] Hospitalist Progress Note    Date of Service: 2017    Chief Complaint  83 y.o. male admitted 2017 with biliary drain leakage. Patient had a biliary drain that was placed 2 weeks ago. He states that he was sitting in his recliner and as he was attempting to get up the draining a clot and when he awakened the next morning his sugar was wet with bile.    Interval Problem Update  Patient remains nothing by mouth for possible intervention radiology  No acute events overnight  Pain is at its baseline that it has been over the past few weeks.  He denies any fevers chills nausea vomiting    Consultants/Specialty  Gastroenterology  Interventional radiology    Disposition  Eventually home        Review of Systems   Constitutional: Negative for chills and fever.   HENT: Negative for hearing loss.    Respiratory: Negative for cough and shortness of breath.    Cardiovascular: Negative for chest pain and palpitations.   Gastrointestinal: Positive for abdominal pain (generalized, mild). Negative for nausea and vomiting.   Genitourinary: Negative for dysuria and frequency.   Musculoskeletal: Negative for back pain and myalgias.   Skin: Negative for itching and rash.   Neurological: Negative for dizziness, tingling and headaches.   Psychiatric/Behavioral: Negative for depression. The patient is not nervous/anxious.       Physical Exam  Laboratory/Imaging   Hemodynamics  Temp (24hrs), Av.3 °C (97.4 °F), Min:36.1 °C (96.9 °F), Max:36.4 °C (97.6 °F)   Temperature: 36.3 °C (97.4 °F)  Pulse  Av.8  Min: 65  Max: 68    Blood Pressure : 153/59      Respiratory      Respiration: 20, Pulse Oximetry: 95 %        RUL Breath Sounds: Clear, RML Breath Sounds: Diminished, RLL Breath Sounds: Diminished, VENKAT Breath Sounds: Clear, LLL Breath Sounds: Diminished    Fluids  No intake or output data in the 24 hours ending 17 1430    Nutrition  Orders Placed This Encounter   Procedures   • Diet NPO     Standing Status:    Standing     Number of Occurrences:   1     Order Specific Question:   Restrict to:     Answer:   Sips with Medications [3]     Physical Exam   Constitutional: He is oriented to person, place, and time. He appears well-developed and well-nourished.   HENT:   Head: Normocephalic and atraumatic.   Eyes: Conjunctivae are normal. Pupils are equal, round, and reactive to light.   Neck: Normal range of motion. Neck supple.   Cardiovascular: Normal rate and regular rhythm.    Pulmonary/Chest: Effort normal and breath sounds normal. No respiratory distress.   Abdominal: Soft. Bowel sounds are normal.   Right-sided biliary drain   Musculoskeletal: He exhibits no edema.   Neurological: He is alert and oriented to person, place, and time.   Skin: Skin is warm and dry.   Nursing note and vitals reviewed.      Recent Labs      09/24/17 1120 09/24/17 1932 09/25/17   0352   WBC  9.0  10.9*  7.3   RBC  3.40*  3.39*  3.46*   HEMOGLOBIN  10.8*  10.8*  10.7*   HEMATOCRIT  32.8*  32.3*  33.4*   MCV  96.5*  95.3  96.5   MCH  31.8*  31.9  30.9   MCHC  32.9*  33.4*  32.0*   RDW  13.8  47.6  50.2*   PLATELETCT  253  320  336   MPV  12.2*  11.7  12.5     Recent Labs      09/24/17 1120 09/24/17 1932 09/25/17   0352   SODIUM  131*  134*  136   POTASSIUM  4.8  3.7  3.7   CHLORIDE  101  104  107   CO2  20*  19*  20   GLUCOSE  298*  72  144*   BUN  29*  25*  22   CREATININE  1.7*  1.48*  1.37   CALCIUM  9.0  9.2  8.1*     Recent Labs      09/24/17 1932   INR  0.98                  Assessment/Plan     * Choledocholithiasis- (present on admission)   Assessment & Plan    Patient had a biliary draina few weeks ago and it became dislodged  Interventional radiology has been consulted  we'll keep the patient nothing by mouth for the procedure        Cholangitis- (present on admission)   Assessment & Plan    He has a history of cholangitis  Given his presentation he has empirically been started on Zosyn therapy  follow GI recommendations         Hypothyroidism- (present on admission)   Assessment & Plan    Synthroid 125 µg daily        Diabetes mellitus (CMS-HCC)- (present on admission)   Assessment & Plan    Continue sliding scale insulin        Elevated lipase   Assessment & Plan    His pain has been the same and stable for the past few weeks  Concern for biliary drainage obstruction vs new stone  GI and IR consulted  NPO for now          Quality-Core Measures

## 2017-09-26 LAB
ALBUMIN SERPL BCP-MCNC: 2.8 G/DL (ref 3.2–4.9)
ALBUMIN/GLOB SERPL: 0.9 G/DL
ALP SERPL-CCNC: 82 U/L (ref 30–99)
ALT SERPL-CCNC: 6 U/L (ref 2–50)
ANION GAP SERPL CALC-SCNC: 8 MMOL/L (ref 0–11.9)
AST SERPL-CCNC: 11 U/L (ref 12–45)
BASOPHILS # BLD AUTO: 0.9 % (ref 0–1.8)
BASOPHILS # BLD: 0.07 K/UL (ref 0–0.12)
BILIRUB SERPL-MCNC: 0.6 MG/DL (ref 0.1–1.5)
BUN SERPL-MCNC: 15 MG/DL (ref 8–22)
CALCIUM SERPL-MCNC: 7.4 MG/DL (ref 8.5–10.5)
CHLORIDE SERPL-SCNC: 110 MMOL/L (ref 96–112)
CO2 SERPL-SCNC: 20 MMOL/L (ref 20–33)
CREAT SERPL-MCNC: 1.18 MG/DL (ref 0.5–1.4)
EOSINOPHIL # BLD AUTO: 0.25 K/UL (ref 0–0.51)
EOSINOPHIL NFR BLD: 3.2 % (ref 0–6.9)
ERYTHROCYTE [DISTWIDTH] IN BLOOD BY AUTOMATED COUNT: 51.7 FL (ref 35.9–50)
GFR SERPL CREATININE-BSD FRML MDRD: 59 ML/MIN/1.73 M 2
GLOBULIN SER CALC-MCNC: 3 G/DL (ref 1.9–3.5)
GLUCOSE BLD-MCNC: 128 MG/DL (ref 65–99)
GLUCOSE BLD-MCNC: 159 MG/DL (ref 65–99)
GLUCOSE BLD-MCNC: 186 MG/DL (ref 65–99)
GLUCOSE BLD-MCNC: 216 MG/DL (ref 65–99)
GLUCOSE SERPL-MCNC: 145 MG/DL (ref 65–99)
HCT VFR BLD AUTO: 30 % (ref 42–52)
HGB BLD-MCNC: 9.6 G/DL (ref 14–18)
IMM GRANULOCYTES # BLD AUTO: 0.01 K/UL (ref 0–0.11)
IMM GRANULOCYTES NFR BLD AUTO: 0.1 % (ref 0–0.9)
LYMPHOCYTES # BLD AUTO: 1.73 K/UL (ref 1–4.8)
LYMPHOCYTES NFR BLD: 22 % (ref 22–41)
MCH RBC QN AUTO: 31.4 PG (ref 27–33)
MCHC RBC AUTO-ENTMCNC: 32 G/DL (ref 33.7–35.3)
MCV RBC AUTO: 98 FL (ref 81.4–97.8)
MONOCYTES # BLD AUTO: 0.7 K/UL (ref 0–0.85)
MONOCYTES NFR BLD AUTO: 8.9 % (ref 0–13.4)
NEUTROPHILS # BLD AUTO: 5.12 K/UL (ref 1.82–7.42)
NEUTROPHILS NFR BLD: 64.9 % (ref 44–72)
NRBC # BLD AUTO: 0 K/UL
NRBC BLD AUTO-RTO: 0 /100 WBC
PLATELET # BLD AUTO: 293 K/UL (ref 164–446)
PMV BLD AUTO: 12.8 FL (ref 9–12.9)
POTASSIUM SERPL-SCNC: 3.4 MMOL/L (ref 3.6–5.5)
PROT SERPL-MCNC: 5.8 G/DL (ref 6–8.2)
RBC # BLD AUTO: 3.06 M/UL (ref 4.7–6.1)
SODIUM SERPL-SCNC: 138 MMOL/L (ref 135–145)
WBC # BLD AUTO: 7.9 K/UL (ref 4.8–10.8)

## 2017-09-26 PROCEDURE — 82962 GLUCOSE BLOOD TEST: CPT | Mod: 91

## 2017-09-26 PROCEDURE — 700102 HCHG RX REV CODE 250 W/ 637 OVERRIDE(OP): Performed by: FAMILY MEDICINE

## 2017-09-26 PROCEDURE — 80053 COMPREHEN METABOLIC PANEL: CPT

## 2017-09-26 PROCEDURE — A9270 NON-COVERED ITEM OR SERVICE: HCPCS | Performed by: FAMILY MEDICINE

## 2017-09-26 PROCEDURE — 770020 HCHG ROOM/CARE - TELE (206)

## 2017-09-26 PROCEDURE — 700105 HCHG RX REV CODE 258: Performed by: INTERNAL MEDICINE

## 2017-09-26 PROCEDURE — 700111 HCHG RX REV CODE 636 W/ 250 OVERRIDE (IP): Performed by: HOSPITALIST

## 2017-09-26 PROCEDURE — 99232 SBSQ HOSP IP/OBS MODERATE 35: CPT | Performed by: HOSPITALIST

## 2017-09-26 PROCEDURE — 85025 COMPLETE CBC W/AUTO DIFF WBC: CPT

## 2017-09-26 PROCEDURE — 700105 HCHG RX REV CODE 258: Performed by: FAMILY MEDICINE

## 2017-09-26 PROCEDURE — 36415 COLL VENOUS BLD VENIPUNCTURE: CPT

## 2017-09-26 PROCEDURE — 700111 HCHG RX REV CODE 636 W/ 250 OVERRIDE (IP): Performed by: FAMILY MEDICINE

## 2017-09-26 RX ADMIN — DEXTROSE AND SODIUM CHLORIDE: 5; 900 INJECTION, SOLUTION INTRAVENOUS at 11:12

## 2017-09-26 RX ADMIN — PIPERACILLIN SODIUM AND TAZOBACTAM SODIUM 3.38 G: 3; .375 INJECTION, POWDER, FOR SOLUTION INTRAVENOUS at 08:38

## 2017-09-26 RX ADMIN — HEPARIN SODIUM 5000 UNITS: 5000 INJECTION, SOLUTION INTRAVENOUS; SUBCUTANEOUS at 21:25

## 2017-09-26 RX ADMIN — DEXTROSE AND SODIUM CHLORIDE: 5; 900 INJECTION, SOLUTION INTRAVENOUS at 23:31

## 2017-09-26 RX ADMIN — INSULIN LISPRO 1 UNITS: 100 INJECTION, SOLUTION INTRAVENOUS; SUBCUTANEOUS at 12:22

## 2017-09-26 RX ADMIN — PIPERACILLIN SODIUM AND TAZOBACTAM SODIUM 3.38 G: 3; .375 INJECTION, POWDER, FOR SOLUTION INTRAVENOUS at 12:03

## 2017-09-26 RX ADMIN — PIPERACILLIN SODIUM AND TAZOBACTAM SODIUM 3.38 G: 3; .375 INJECTION, POWDER, FOR SOLUTION INTRAVENOUS at 23:31

## 2017-09-26 RX ADMIN — LEVOTHYROXINE SODIUM 125 MCG: 125 TABLET ORAL at 06:00

## 2017-09-26 RX ADMIN — INSULIN LISPRO 1 UNITS: 100 INJECTION, SOLUTION INTRAVENOUS; SUBCUTANEOUS at 05:58

## 2017-09-26 RX ADMIN — INSULIN LISPRO 2 UNITS: 100 INJECTION, SOLUTION INTRAVENOUS; SUBCUTANEOUS at 18:37

## 2017-09-26 RX ADMIN — PIPERACILLIN SODIUM AND TAZOBACTAM SODIUM 3.38 G: 3; .375 INJECTION, POWDER, FOR SOLUTION INTRAVENOUS at 19:26

## 2017-09-26 RX ADMIN — HEPARIN SODIUM 5000 UNITS: 5000 INJECTION, SOLUTION INTRAVENOUS; SUBCUTANEOUS at 14:21

## 2017-09-26 ASSESSMENT — ENCOUNTER SYMPTOMS
NERVOUS/ANXIOUS: 0
NAUSEA: 0
FEVER: 0
ABDOMINAL PAIN: 1
HEADACHES: 0
DIZZINESS: 0
BACK PAIN: 0
PALPITATIONS: 0
DEPRESSION: 0
WEAKNESS: 0
FOCAL WEAKNESS: 0
SHORTNESS OF BREATH: 0
CHILLS: 0
MYALGIAS: 0
VOMITING: 0
COUGH: 0

## 2017-09-26 ASSESSMENT — PAIN SCALES - GENERAL
PAINLEVEL_OUTOF10: 0

## 2017-09-26 NOTE — CARE PLAN
Problem: Communication  Goal: The ability to communicate needs accurately and effectively will improve  Listened to patients discussion of concerns related to disease process and treatment plan. Discussed with patient concerns, goals and management. Discussed importance of patient reporting new signs and symptoms related to disease process. Patient verbalized understanding and understands importance of communicating needs.  Supported and educated patient by addressing specific questions regarding diagnosis, risks, benefits of pain management treatment.            Problem: Safety  Goal: Will remain free from injury  No slip socks on pt. Bed alarm is on. Call light in place. Pt verbalized understanding of using call light for assistance.

## 2017-09-26 NOTE — CARE PLAN
Problem: Communication  Goal: The ability to communicate needs accurately and effectively will improve  Outcome: PROGRESSING AS EXPECTED  Pt has communicated needs.    Problem: Safety  Goal: Will remain free from injury  Outcome: PROGRESSING AS EXPECTED  Pt has remained free from injury.  Goal: Will remain free from falls  Outcome: PROGRESSING AS EXPECTED  Pt has remained free from falls.    Problem: Venous Thromboembolism (VTW)/Deep Vein Thrombosis (DVT) Prevention:  Goal: Patient will participate in Venous Thrombosis (VTE)/Deep Vein Thrombosis (DVT)Prevention Measures  Outcome: PROGRESSING AS EXPECTED  Pt has participated in VTE and DVT prevention.    Problem: Respiratory:  Goal: Respiratory status will improve  Outcome: PROGRESSING AS EXPECTED  Pt has remained free from respiratory distress.

## 2017-09-26 NOTE — PROGRESS NOTES
Received bedside report from Kristie FELICIANO, Pt assessed, A&Ox4, Vitals stable, denies pain, no SOB Noted.  Pt updated on plan of care, Call light within reach, personal belongings within reach. All questions answered. Bed in lowest position, Bed Strip alarm is on.  Pt ambulates via 1 person assist with FWW. Tele monitor on. Will continue to monitor.

## 2017-09-26 NOTE — PROGRESS NOTES
Report received from Amanda FELICIANO. Assumed care of patient. Pt is awake in bed on room air, pt updated on plan of care, denies needs at this time.

## 2017-09-27 ENCOUNTER — APPOINTMENT (OUTPATIENT)
Dept: RADIOLOGY | Facility: MEDICAL CENTER | Age: 82
DRG: 919 | End: 2017-09-27
Attending: HOSPITALIST
Payer: MEDICARE

## 2017-09-27 PROBLEM — K83.09 CHOLANGITIS: Status: RESOLVED | Noted: 2017-09-07 | Resolved: 2017-09-27

## 2017-09-27 LAB
GLUCOSE BLD-MCNC: 158 MG/DL (ref 65–99)
GLUCOSE BLD-MCNC: 160 MG/DL (ref 65–99)
GLUCOSE BLD-MCNC: 173 MG/DL (ref 65–99)
GLUCOSE BLD-MCNC: 186 MG/DL (ref 65–99)

## 2017-09-27 PROCEDURE — 82962 GLUCOSE BLOOD TEST: CPT | Mod: 91

## 2017-09-27 PROCEDURE — 700105 HCHG RX REV CODE 258: Performed by: FAMILY MEDICINE

## 2017-09-27 PROCEDURE — 99232 SBSQ HOSP IP/OBS MODERATE 35: CPT | Performed by: HOSPITALIST

## 2017-09-27 PROCEDURE — 700111 HCHG RX REV CODE 636 W/ 250 OVERRIDE (IP): Performed by: HOSPITALIST

## 2017-09-27 PROCEDURE — 700102 HCHG RX REV CODE 250 W/ 637 OVERRIDE(OP): Performed by: FAMILY MEDICINE

## 2017-09-27 PROCEDURE — A9270 NON-COVERED ITEM OR SERVICE: HCPCS | Performed by: FAMILY MEDICINE

## 2017-09-27 PROCEDURE — 770020 HCHG ROOM/CARE - TELE (206)

## 2017-09-27 PROCEDURE — 74000 DX-ABDOMEN-1 VIEW: CPT

## 2017-09-27 PROCEDURE — 700105 HCHG RX REV CODE 258: Performed by: INTERNAL MEDICINE

## 2017-09-27 PROCEDURE — 700111 HCHG RX REV CODE 636 W/ 250 OVERRIDE (IP): Performed by: FAMILY MEDICINE

## 2017-09-27 RX ADMIN — LEVOTHYROXINE SODIUM 125 MCG: 125 TABLET ORAL at 06:05

## 2017-09-27 RX ADMIN — HEPARIN SODIUM 5000 UNITS: 5000 INJECTION, SOLUTION INTRAVENOUS; SUBCUTANEOUS at 06:05

## 2017-09-27 RX ADMIN — PIPERACILLIN SODIUM AND TAZOBACTAM SODIUM 3.38 G: 3; .375 INJECTION, POWDER, FOR SOLUTION INTRAVENOUS at 18:05

## 2017-09-27 RX ADMIN — PIPERACILLIN SODIUM AND TAZOBACTAM SODIUM 3.38 G: 3; .375 INJECTION, POWDER, FOR SOLUTION INTRAVENOUS at 12:14

## 2017-09-27 RX ADMIN — INSULIN LISPRO 1 UNITS: 100 INJECTION, SOLUTION INTRAVENOUS; SUBCUTANEOUS at 20:44

## 2017-09-27 RX ADMIN — INSULIN LISPRO 1 UNITS: 100 INJECTION, SOLUTION INTRAVENOUS; SUBCUTANEOUS at 12:19

## 2017-09-27 RX ADMIN — INSULIN LISPRO 1 UNITS: 100 INJECTION, SOLUTION INTRAVENOUS; SUBCUTANEOUS at 18:06

## 2017-09-27 RX ADMIN — DEXTROSE AND SODIUM CHLORIDE: 5; 900 INJECTION, SOLUTION INTRAVENOUS at 12:14

## 2017-09-27 RX ADMIN — INSULIN LISPRO 1 UNITS: 100 INJECTION, SOLUTION INTRAVENOUS; SUBCUTANEOUS at 06:04

## 2017-09-27 RX ADMIN — PIPERACILLIN SODIUM AND TAZOBACTAM SODIUM 3.38 G: 3; .375 INJECTION, POWDER, FOR SOLUTION INTRAVENOUS at 06:05

## 2017-09-27 ASSESSMENT — ENCOUNTER SYMPTOMS
VOMITING: 0
CONSTIPATION: 0
BLURRED VISION: 0
FEVER: 0
NAUSEA: 0
WEAKNESS: 0
HEADACHES: 0
DIARRHEA: 0
DIZZINESS: 0
COUGH: 0
SHORTNESS OF BREATH: 0
ABDOMINAL PAIN: 1
PALPITATIONS: 0
ABDOMINAL PAIN: 0
BLOOD IN STOOL: 0
DEPRESSION: 0
FOCAL WEAKNESS: 0
CHILLS: 0
NERVOUS/ANXIOUS: 0
BACK PAIN: 0
MYALGIAS: 0
PSYCHIATRIC NEGATIVE: 1

## 2017-09-27 ASSESSMENT — LIFESTYLE VARIABLES: DO YOU DRINK ALCOHOL: NO

## 2017-09-27 ASSESSMENT — PAIN SCALES - GENERAL
PAINLEVEL_OUTOF10: 0

## 2017-09-27 NOTE — PROGRESS NOTES
[facility]ist Progress Note    Date of Service: 2017    Chief Complaint  83 y.o. male admitted 2017 with biliary drain leakage. Patient had a biliary drain that was placed 2 weeks ago. He states that he was sitting in his recliner and as he was attempting to get up the draining a clot and when he awakened the next morning his sugar was wet with bile.    Interval Problem Update  Patient remains nothing by mouth for possible intervention radiology  No acute events overnight  Pain is at its baseline that it has been over the past few weeks.  He denies any fevers chills nausea vomiting    Consultants/Specialty  Gastroenterology  Interventional radiology    Disposition  Eventually home        Review of Systems   Constitutional: Positive for malaise/fatigue. Negative for chills and fever.   Respiratory: Negative for cough and shortness of breath.    Cardiovascular: Negative for chest pain, palpitations and leg swelling.   Gastrointestinal: Positive for abdominal pain (generalized, mild). Negative for nausea and vomiting.   Genitourinary: Negative for dysuria and frequency.   Musculoskeletal: Negative for back pain and myalgias.   Skin: Negative.    Neurological: Negative for dizziness, focal weakness, weakness and headaches.   Psychiatric/Behavioral: Negative for depression. The patient is not nervous/anxious.       Physical Exam  Laboratory/Imaging   Hemodynamics  Temp (24hrs), Av.4 °C (97.6 °F), Min:36.1 °C (96.9 °F), Max:36.8 °C (98.3 °F)   Temperature: 36.6 °C (97.9 °F)  Pulse  Av.7  Min: 60  Max: 73    Blood Pressure : 142/70      Respiratory      Respiration: 16, Pulse Oximetry: 95 %        RUL Breath Sounds: Clear, RML Breath Sounds: Diminished, RLL Breath Sounds: Diminished, VENKAT Breath Sounds: Clear, LLL Breath Sounds: Diminished    Fluids    Intake/Output Summary (Last 24 hours) at 17 2146  Last data filed at 17 1720   Gross per 24 hour   Intake                0 ml   Output              1450 ml   Net            -1450 ml       Nutrition  Orders Placed This Encounter   Procedures   • Diet NPO     Standing Status:   Standing     Number of Occurrences:   1     Order Specific Question:   Restrict to:     Answer:   Sips with Medications [3]     Physical Exam   Constitutional: He is oriented to person, place, and time. He appears well-developed and well-nourished.   HENT:   Head: Normocephalic and atraumatic.   Eyes: Conjunctivae and EOM are normal. No scleral icterus.   Neck: Normal range of motion. Neck supple.   Cardiovascular: Regular rhythm.  Tachycardia present.    Pulmonary/Chest: Effort normal and breath sounds normal. No respiratory distress. He has no rales.   Abdominal: Soft. Bowel sounds are normal. He exhibits no distension. There is tenderness.   Right-sided biliary drain   Musculoskeletal: He exhibits no edema.   Neurological: He is alert and oriented to person, place, and time.   Skin: Skin is warm and dry.   Nursing note and vitals reviewed.      Recent Labs      09/24/17 1932 09/25/17 0352 09/26/17   0430   WBC  10.9*  7.3  7.9   RBC  3.39*  3.46*  3.06*   HEMOGLOBIN  10.8*  10.7*  9.6*   HEMATOCRIT  32.3*  33.4*  30.0*   MCV  95.3  96.5  98.0*   MCH  31.9  30.9  31.4   MCHC  33.4*  32.0*  32.0*   RDW  47.6  50.2*  51.7*   PLATELETCT  320  336  293   MPV  11.7  12.5  12.8     Recent Labs      09/24/17 1932 09/25/17 0352 09/26/17   0430   SODIUM  134*  136  138   POTASSIUM  3.7  3.7  3.4*   CHLORIDE  104  107  110   CO2  19*  20  20   GLUCOSE  72  144*  145*   BUN  25*  22  15   CREATININE  1.48*  1.37  1.18   CALCIUM  9.2  8.1*  7.4*     Recent Labs      09/24/17 1932   INR  0.98                  Assessment/Plan     * Choledocholithiasis- (present on admission)   Assessment & Plan    Patient had a biliary draina few weeks ago and it became dislodged. Elevated lipase with concern for obstruction.  - Interventional radiology has been consulted  - GI on board, greatly  appreciate their recs  - f/u KUB        Cholangitis- (present on admission)   Assessment & Plan    He has a history of cholangitis  Given his presentation he has empirically been started on Zosyn therapy  follow GI recommendations        Hypothyroidism- (present on admission)   Assessment & Plan    Synthroid 125 µg daily        Diabetes mellitus (CMS-HCC)- (present on admission)   Assessment & Plan    A1C 7.1%.  - Continue sliding scale insulin            Reviewed items::  EKG reviewed, Medications reviewed, Radiology images reviewed and Labs reviewed  Long catheter::  No Long  DVT prophylaxis pharmacological::  Heparin  Ulcer Prophylaxis::  Not indicated  Antibiotics:  Treating active infection/contamination beyond 24 hours perioperative coverage

## 2017-09-27 NOTE — CARE PLAN
Problem: Communication  Goal: The ability to communicate needs accurately and effectively will improve  Listened to patients discussion of concerns related to disease process and treatment plan. Discussed with patient concerns, goals and management. Discussed importance of patient reporting new signs and symptoms related to disease process. Patient verbalized understanding and understands importance of communicating needs.  Supported and educated patient by addressing specific questions regarding diagnosis, risks, benefits of pain management treatment.            Problem: Safety  Goal: Will remain free from falls  No slip socks on pt. Bed alarm is on. Call light in place. Pt verbalized understanding of using call light for assistance.

## 2017-09-27 NOTE — PROGRESS NOTES
Received bedside report from Kristie FELICIANO, Pt assessed, A&Ox4, Vitals stable, denies pain when still, has back pain when he is trying to get out of bed, Lung sounds clear/diminished in bases, no SOB Noted. Right sided biliary drain intact. New IV inserted. Pt updated on plan of care, Call light within reach, personal belongings within reach.  Bed in lowest position, Bed Strip alarm is on.  Pt ambulates via 1 person assist with FWW. Tele monitor on. Will continue to monitor.

## 2017-09-27 NOTE — CARE PLAN
Problem: Safety  Goal: Will remain free from injury  Outcome: PROGRESSING AS EXPECTED  Patient educated to use call light for assistance. Fall precautions in place. Staff will assist with mobilization. Hourly rounding in place.      Problem: Infection  Goal: Will remain free from infection  Outcome: PROGRESSING AS EXPECTED  Proper hand hygiene before and after patient care to ensure patient will remain free from infection.

## 2017-09-27 NOTE — PROGRESS NOTES
Assumed care of patient. Assessment complete. Patient denies pain at this time. Educated to use call light for assistance. Fall precautions in place. Tele box in place. Will continue to monitor with hourly rounding.

## 2017-09-27 NOTE — PROGRESS NOTES
"DATE OF CONSULTATION: 9/27/17    REASON FOR CONSULTATION: Elevated lipase, Hx of biliary drain, maladjustment of biliary drain        HISTORY OF PRESENT ILLNESS:   83 year old patient admitted previously for choledocholithiasis with a Hx of Bilroth II procedure for prior PUD, and was chosen for IR guided biliary stenting performed on 9/8/17 with a 12 F biliary drain culture positive for enteric organisms and treated with antibiotics and subsequent IR guided dilatation of choledochojejunestomy anastamoses and push CBD stone into jejunum presented to the ED for accidental manipulation and partial removal of the biliary drain. Currently AST/ALT/ALKPO4/T bili are all normal. Lipase is elevated at 2597. Patient reports mild diffuse abdominal pain. Last meal was on Sunday. Denies any increase in abdominal pain with food. Denies fevers, chills, nausea, vomiting, or blood in stool.             Past Admission:  Note by Dr. Melo on 9/11/17  \"ERCP by Dr. Mcguire showed a normal ampulla of Vater.  The distal common biliary duct was reportedly normal and the proximal common biliary duct was dilated to 9.6 mm and the common hepatic duct was dilated to 18 mm.  There was no evidence of a stricture or filling defect.  The intrahepatic ducts were moderately dilated and the mass like confluence close to the gallbladder fossa seen on MRCP was felt to be an imaging artifact.  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  ERCP from September 2010 by Dr. Mcguire showed Billroth 2 anatomy.  No sphincterotomy was done.  Patient is unsure about his past surgical history.\"      PAST MEDICAL HISTORY:  Past Medical History:   Diagnosis Date   • History of peptic ulcer 1960   • Cancer (CMS-HCC)     Prostate   • Diabetes    • Heart murmur          SURGICAL HISTORY:  Past Surgical History:   Procedure Laterality Date   • ABDOMINAL EXPLORATION     • CHOLECYSTECTOMY   "   • CHOLECYSTECTOMY     • CHOLECYSTECTOMY     • GASTRECTOMY     • HERNIA REPAIR      Abdominal   • PROSTATECTOMY, RADICAL RETRO     • THYROIDECTOMY           SOCIAL HISTORY:  Social History   Substance Use Topics   • Smoking status: Never Smoker   • Smokeless tobacco: Never Used   • Alcohol use No         FAMILY HISTORY:  History reviewed. No pertinent family history.      HOME MEDICATIONS:   Prior to Admission medications    Medication Sig Start Date End Date Taking? Authorizing Provider   levofloxacin (LEVAQUIN) 500 MG tablet Take 1 Tab by mouth every day. 9/14/17   Rashad Sheffield M.D.   Cholecalciferol (VITAMIN D PO) Take 1 Cap by mouth every day.    Not In System Provider   insulin detemir (LEVEMIR FLEXPEN) 100 UNIT/ML SOPN injection Inject 8 Units as instructed every morning.    Not In System Provider   levothyroxine (SYNTHROID) 125 MCG TABS Take 1 Tab by mouth Every morning on an empty stomach. 12/15/13   Sohail Alamo M.D.         REVIEW OF SYSTEMS:  Review of Systems   Constitutional: Negative for chills and fever.   Eyes: Negative for blurred vision.   Respiratory: Negative for cough and shortness of breath.    Cardiovascular: Negative for chest pain and leg swelling.   Gastrointestinal: Positive for abdominal pain. Negative for blood in stool, constipation, diarrhea, melena, nausea and vomiting.   Genitourinary: Negative for dysuria and hematuria.   Musculoskeletal: Negative for myalgias.   Skin: Negative for rash.   Neurological: Negative for dizziness, focal weakness and headaches.   Psychiatric/Behavioral: Negative.        PHYSICAL EXAMINATION:  Vitals:    09/26/17 2120 09/27/17 0000 09/27/17 0400 09/27/17 0800   BP: 142/70 143/69 142/60 138/67   Pulse: 62 60 62 64   Resp: 16 18 16 18   Temp: 36.6 °C (97.9 °F) 36.6 °C (97.8 °F) 36.4 °C (97.6 °F) 36.5 °C (97.7 °F)   SpO2: 95% 94% 95% 98%   Weight: 68 kg (149 lb 14.6 oz)      Height:         Body mass index is 20.33 kg/m².  /67   Pulse 64    Temp 36.5 °C (97.7 °F)   Resp 18   Ht 1.829 m (6')   Wt 68 kg (149 lb 14.6 oz)   SpO2 98%   BMI 20.33 kg/m²   O2 therapy: Pulse Oximetry: 98 %, O2 (LPM): 0, O2 Delivery: None (Room Air)    Physical Exam   Constitutional: He is oriented to person, place, and time and well-developed, well-nourished, and in no distress. No distress.   HENT:   Head: Normocephalic and atraumatic.   Eyes: EOM are normal. Pupils are equal, round, and reactive to light.   Neck: Normal range of motion. Neck supple.   Cardiovascular: Normal rate, regular rhythm and normal heart sounds.  Exam reveals no gallop and no friction rub.    No murmur heard.  Pulmonary/Chest: Effort normal and breath sounds normal. No respiratory distress. He has no wheezes. He has no rales.   Abdominal: Soft. Bowel sounds are normal. He exhibits no distension. There is no tenderness. There is no rebound.   RUQ drain in place. Bile draining.    Musculoskeletal: Normal range of motion. He exhibits no edema or deformity.   Neurological: He is alert and oriented to person, place, and time. GCS score is 15.   Skin: Skin is dry. He is not diaphoretic.   Psychiatric: Mood, memory, affect and judgment normal.       LABORATORY DATA:     Recent Results (from the past 24 hour(s))   ACCU-CHEK GLUCOSE    Collection Time: 09/26/17 12:06 PM   Result Value Ref Range    Glucose - Accu-Ck 186 (H) 65 - 99 mg/dL   ACCU-CHEK GLUCOSE    Collection Time: 09/26/17  6:41 PM   Result Value Ref Range    Glucose - Accu-Ck 216 (H) 65 - 99 mg/dL   ACCU-CHEK GLUCOSE    Collection Time: 09/26/17  9:28 PM   Result Value Ref Range    Glucose - Accu-Ck 128 (H) 65 - 99 mg/dL   ACCU-CHEK GLUCOSE    Collection Time: 09/27/17  6:01 AM   Result Value Ref Range    Glucose - Accu-Ck 173 (H) 65 - 99 mg/dL       OUTSIDE IMAGES-CT ABDOMEN /PELVIS   Final Result      OUTSIDE IMAGES-DX ABDOMEN   Final Result      DX-ABDOMEN FOR TUBE PLACEMENT    (Results Pending)       ASSESSMENT AND PLAN:      Impression:    1. Maladjustment of biliary drain  2. Elevated lipase  3. Abdominal pain, minimal,.  No severe pain this past week  4. History of prior Billroth II procedure for prior peptic ulcer disease  5. History of peptic ulcer disease  6.  History of remote acute pancreatitis but cannot recall etiology. He is a nondrinker  6. Xarelto anticoagulation therapy for history of deep vein thrombosis      Plan    1. Continue Zosyn  2. I spoke with Dr. Huerta and will put patient on IR schedule tomorrow for upsizing biliary internal/external drain   3. Since no abdominal pain, will try cl liq tonight and then NPO at MN  4.  Unclear why he has pancreatitis/ pancreatic ductal dilation if the stone was pushed past the choledochojejunostomy anastomotic stricture  5.  Nayeli Funk will assume service in am    Quality Measures  Quality-Core Measures

## 2017-09-27 NOTE — PROGRESS NOTES
Patient is resting comfortably in bed, no signs of distress, even unlabored breathing, will continue to monitor.

## 2017-09-28 ENCOUNTER — APPOINTMENT (OUTPATIENT)
Dept: RADIOLOGY | Facility: MEDICAL CENTER | Age: 82
DRG: 919 | End: 2017-09-28
Attending: INTERNAL MEDICINE
Payer: MEDICARE

## 2017-09-28 LAB
ALBUMIN SERPL BCP-MCNC: 3 G/DL (ref 3.2–4.9)
ALBUMIN/GLOB SERPL: 1 G/DL
ALP SERPL-CCNC: 81 U/L (ref 30–99)
ALT SERPL-CCNC: 6 U/L (ref 2–50)
ANION GAP SERPL CALC-SCNC: 10 MMOL/L (ref 0–11.9)
AST SERPL-CCNC: 13 U/L (ref 12–45)
BILIRUB SERPL-MCNC: 0.6 MG/DL (ref 0.1–1.5)
BUN SERPL-MCNC: 11 MG/DL (ref 8–22)
CALCIUM SERPL-MCNC: 7 MG/DL (ref 8.5–10.5)
CHLORIDE SERPL-SCNC: 112 MMOL/L (ref 96–112)
CO2 SERPL-SCNC: 18 MMOL/L (ref 20–33)
CREAT SERPL-MCNC: 1.05 MG/DL (ref 0.5–1.4)
GFR SERPL CREATININE-BSD FRML MDRD: >60 ML/MIN/1.73 M 2
GLOBULIN SER CALC-MCNC: 3.1 G/DL (ref 1.9–3.5)
GLUCOSE BLD-MCNC: 111 MG/DL (ref 65–99)
GLUCOSE BLD-MCNC: 211 MG/DL (ref 65–99)
GLUCOSE SERPL-MCNC: 111 MG/DL (ref 65–99)
LIPASE SERPL-CCNC: 43 U/L (ref 11–82)
POTASSIUM SERPL-SCNC: 3.2 MMOL/L (ref 3.6–5.5)
PROT SERPL-MCNC: 6.1 G/DL (ref 6–8.2)
SODIUM SERPL-SCNC: 140 MMOL/L (ref 135–145)

## 2017-09-28 PROCEDURE — BF101ZZ FLUOROSCOPY OF BILE DUCTS USING LOW OSMOLAR CONTRAST: ICD-10-PCS | Performed by: RADIOLOGY

## 2017-09-28 PROCEDURE — 700117 HCHG RX CONTRAST REV CODE 255: Performed by: RADIOLOGY

## 2017-09-28 PROCEDURE — 700111 HCHG RX REV CODE 636 W/ 250 OVERRIDE (IP): Performed by: FAMILY MEDICINE

## 2017-09-28 PROCEDURE — 83690 ASSAY OF LIPASE: CPT

## 2017-09-28 PROCEDURE — 770020 HCHG ROOM/CARE - TELE (206)

## 2017-09-28 PROCEDURE — 36415 COLL VENOUS BLD VENIPUNCTURE: CPT

## 2017-09-28 PROCEDURE — 80053 COMPREHEN METABOLIC PANEL: CPT

## 2017-09-28 PROCEDURE — 700111 HCHG RX REV CODE 636 W/ 250 OVERRIDE (IP)

## 2017-09-28 PROCEDURE — 99232 SBSQ HOSP IP/OBS MODERATE 35: CPT | Performed by: HOSPITALIST

## 2017-09-28 PROCEDURE — 700102 HCHG RX REV CODE 250 W/ 637 OVERRIDE(OP): Performed by: FAMILY MEDICINE

## 2017-09-28 PROCEDURE — A9270 NON-COVERED ITEM OR SERVICE: HCPCS | Performed by: FAMILY MEDICINE

## 2017-09-28 PROCEDURE — 0FPBX0Z REMOVAL OF DRAINAGE DEVICE FROM HEPATOBILIARY DUCT, EXTERNAL APPROACH: ICD-10-PCS | Performed by: RADIOLOGY

## 2017-09-28 PROCEDURE — 0F9930Z DRAINAGE OF COMMON BILE DUCT WITH DRAINAGE DEVICE, PERCUTANEOUS APPROACH: ICD-10-PCS | Performed by: RADIOLOGY

## 2017-09-28 PROCEDURE — 700111 HCHG RX REV CODE 636 W/ 250 OVERRIDE (IP): Performed by: HOSPITALIST

## 2017-09-28 PROCEDURE — 700105 HCHG RX REV CODE 258: Performed by: INTERNAL MEDICINE

## 2017-09-28 PROCEDURE — 700105 HCHG RX REV CODE 258: Performed by: FAMILY MEDICINE

## 2017-09-28 PROCEDURE — 82962 GLUCOSE BLOOD TEST: CPT | Mod: 91

## 2017-09-28 PROCEDURE — 47536 EXCHANGE BILIARY DRG CATH: CPT

## 2017-09-28 RX ORDER — SODIUM CHLORIDE 9 MG/ML
500 INJECTION, SOLUTION INTRAVENOUS PRN
Status: DISCONTINUED | OUTPATIENT
Start: 2017-09-28 | End: 2017-10-01 | Stop reason: HOSPADM

## 2017-09-28 RX ORDER — ONDANSETRON 2 MG/ML
4 INJECTION INTRAMUSCULAR; INTRAVENOUS PRN
Status: ACTIVE | OUTPATIENT
Start: 2017-09-28 | End: 2017-09-28

## 2017-09-28 RX ORDER — MIDAZOLAM HYDROCHLORIDE 1 MG/ML
.5-2 INJECTION INTRAMUSCULAR; INTRAVENOUS PRN
Status: ACTIVE | OUTPATIENT
Start: 2017-09-28 | End: 2017-09-28

## 2017-09-28 RX ORDER — MIDAZOLAM HYDROCHLORIDE 1 MG/ML
INJECTION INTRAMUSCULAR; INTRAVENOUS
Status: COMPLETED
Start: 2017-09-28 | End: 2017-09-28

## 2017-09-28 RX ADMIN — PIPERACILLIN SODIUM AND TAZOBACTAM SODIUM 3.38 G: 3; .375 INJECTION, POWDER, FOR SOLUTION INTRAVENOUS at 18:05

## 2017-09-28 RX ADMIN — HEPARIN SODIUM 5000 UNITS: 5000 INJECTION, SOLUTION INTRAVENOUS; SUBCUTANEOUS at 14:30

## 2017-09-28 RX ADMIN — IOHEXOL 10 ML: 300 INJECTION, SOLUTION INTRAVENOUS at 11:40

## 2017-09-28 RX ADMIN — MIDAZOLAM 2 MG: 1 INJECTION INTRAMUSCULAR; INTRAVENOUS at 11:37

## 2017-09-28 RX ADMIN — HEPARIN SODIUM 5000 UNITS: 5000 INJECTION, SOLUTION INTRAVENOUS; SUBCUTANEOUS at 21:52

## 2017-09-28 RX ADMIN — PIPERACILLIN SODIUM AND TAZOBACTAM SODIUM 3.38 G: 3; .375 INJECTION, POWDER, FOR SOLUTION INTRAVENOUS at 05:57

## 2017-09-28 RX ADMIN — LEVOTHYROXINE SODIUM 125 MCG: 125 TABLET ORAL at 05:56

## 2017-09-28 RX ADMIN — INSULIN LISPRO 4 UNITS: 100 INJECTION, SOLUTION INTRAVENOUS; SUBCUTANEOUS at 17:00

## 2017-09-28 RX ADMIN — PIPERACILLIN SODIUM AND TAZOBACTAM SODIUM 3.38 G: 3; .375 INJECTION, POWDER, FOR SOLUTION INTRAVENOUS at 12:00

## 2017-09-28 RX ADMIN — PIPERACILLIN SODIUM AND TAZOBACTAM SODIUM 3.38 G: 3; .375 INJECTION, POWDER, FOR SOLUTION INTRAVENOUS at 23:36

## 2017-09-28 RX ADMIN — FENTANYL CITRATE 50 MCG: 50 INJECTION, SOLUTION INTRAMUSCULAR; INTRAVENOUS at 11:37

## 2017-09-28 RX ADMIN — DEXTROSE AND SODIUM CHLORIDE: 5; 900 INJECTION, SOLUTION INTRAVENOUS at 18:07

## 2017-09-28 RX ADMIN — DEXTROSE AND SODIUM CHLORIDE: 5; 900 INJECTION, SOLUTION INTRAVENOUS at 00:12

## 2017-09-28 RX ADMIN — MIDAZOLAM HYDROCHLORIDE 2 MG: 1 INJECTION INTRAMUSCULAR; INTRAVENOUS at 11:37

## 2017-09-28 RX ADMIN — PIPERACILLIN SODIUM AND TAZOBACTAM SODIUM 3.38 G: 3; .375 INJECTION, POWDER, FOR SOLUTION INTRAVENOUS at 00:12

## 2017-09-28 RX ADMIN — INSULIN LISPRO 2 UNITS: 100 INJECTION, SOLUTION INTRAVENOUS; SUBCUTANEOUS at 21:55

## 2017-09-28 ASSESSMENT — ENCOUNTER SYMPTOMS
FOCAL WEAKNESS: 0
VOMITING: 0
COUGH: 0
HEADACHES: 0
NAUSEA: 0
NERVOUS/ANXIOUS: 0
PALPITATIONS: 0
WEAKNESS: 1
MUSCULOSKELETAL NEGATIVE: 1
MYALGIAS: 0
PSYCHIATRIC NEGATIVE: 1
CONSTITUTIONAL NEGATIVE: 1
ABDOMINAL PAIN: 0
CHILLS: 0
FEVER: 0
DIZZINESS: 0
BACK PAIN: 0
SHORTNESS OF BREATH: 0
GASTROINTESTINAL NEGATIVE: 1
DEPRESSION: 0

## 2017-09-28 ASSESSMENT — PAIN SCALES - GENERAL
PAINLEVEL_OUTOF10: 0

## 2017-09-28 NOTE — PROGRESS NOTES
[facility] Hospitalist Progress Note    Date of Service: 2017    Chief Complaint  83 y.o. male admitted 2017 with biliary drain leakage. Patient had a biliary drain that was placed 2 weeks ago. He states that he was sitting in his recliner and as he was attempting to get up the draining a clot and when he awakened the next morning his sugar was wet with bile.    Interval Problem Update  Denies any new symptoms. Would like to know the plan for today and when he might be able to go home. No acute overnight events.    Consultants/Specialty  Gastroenterology  Interventional radiology    Disposition  Home when medically cleared.        Review of Systems   Constitutional: Positive for malaise/fatigue. Negative for chills and fever.   Respiratory: Negative for cough and shortness of breath.    Cardiovascular: Negative for chest pain, palpitations and leg swelling.   Gastrointestinal: Negative for abdominal pain (generalized, mild), nausea and vomiting.   Genitourinary: Negative for dysuria and frequency.   Musculoskeletal: Negative for back pain and myalgias.   Skin: Negative.    Neurological: Negative for dizziness, focal weakness, weakness and headaches.   Psychiatric/Behavioral: Negative for depression. The patient is not nervous/anxious.       Physical Exam  Laboratory/Imaging   Hemodynamics  Temp (24hrs), Av.6 °C (97.9 °F), Min:36.4 °C (97.6 °F), Max:36.7 °C (98.1 °F)   Temperature: 36.7 °C (98.1 °F)  Pulse  Av.4  Min: 60  Max: 73    Blood Pressure : 154/57      Respiratory      Respiration: 16, Pulse Oximetry: 96 %        RUL Breath Sounds: Clear, RML Breath Sounds: Diminished, RLL Breath Sounds: Diminished, VENKAT Breath Sounds: Clear, LLL Breath Sounds: Diminished    Fluids    Intake/Output Summary (Last 24 hours) at 17 0767  Last data filed at 17 1800   Gross per 24 hour   Intake             1200 ml   Output             1475 ml   Net             -275 ml       Nutrition  Orders Placed This  Encounter   Procedures   • DIET ORDER     Standing Status:   Standing     Number of Occurrences:   1     Order Specific Question:   Diet:     Answer:   Clear Liquid [10]   • DIET NPO     Standing Status:   Standing     Number of Occurrences:   8     Order Specific Question:   Restrict to:     Answer:   Strict [1]     Physical Exam   Constitutional: He is oriented to person, place, and time. He appears well-developed and well-nourished.   HENT:   Head: Normocephalic and atraumatic.   Eyes: Conjunctivae and EOM are normal. No scleral icterus.   Neck: Normal range of motion. Neck supple.   Cardiovascular: Normal rate and regular rhythm.    Pulmonary/Chest: Effort normal and breath sounds normal. No respiratory distress. He has no rales.   Abdominal: Soft. Bowel sounds are normal. He exhibits no distension. There is no tenderness.   Right-sided biliary drain   Musculoskeletal: He exhibits no edema.   Neurological: He is alert and oriented to person, place, and time.   Skin: Skin is warm and dry.   Nursing note and vitals reviewed.      Recent Labs      09/24/17 1932 09/25/17 0352 09/26/17 0430   WBC  10.9*  7.3  7.9   RBC  3.39*  3.46*  3.06*   HEMOGLOBIN  10.8*  10.7*  9.6*   HEMATOCRIT  32.3*  33.4*  30.0*   MCV  95.3  96.5  98.0*   MCH  31.9  30.9  31.4   MCHC  33.4*  32.0*  32.0*   RDW  47.6  50.2*  51.7*   PLATELETCT  320  336  293   MPV  11.7  12.5  12.8     Recent Labs      09/24/17 1932 09/25/17 0352 09/26/17   0430   SODIUM  134*  136  138   POTASSIUM  3.7  3.7  3.4*   CHLORIDE  104  107  110   CO2  19*  20  20   GLUCOSE  72  144*  145*   BUN  25*  22  15   CREATININE  1.48*  1.37  1.18   CALCIUM  9.2  8.1*  7.4*     Recent Labs      09/24/17 1932   INR  0.98             [facility]   Malpositioned internal-external biliary drainage catheter with catheter side holes clearly outside the abdominal cavity. The pigtail loop of the catheter is  probably near the level of the choledochojejunostomy  anastomosis.   Assessment/Plan     * Choledocholithiasis- (present on admission)   Assessment & Plan    Patient had a biliary draina few weeks ago and it became dislodged. Elevated lipase with concern for obstruction. KUB showed malpositioned drain.  - Interventional radiology has been consulted  - continue zosyn  - GI on board, greatly appreciate their recs  - IR to replace drain with larger size  - NPO after midnight        Hypothyroidism- (present on admission)   Assessment & Plan    Synthroid 125 µg daily        Diabetes mellitus (CMS-HCC)- (present on admission)   Assessment & Plan    A1C 7.1%.  - Continue sliding scale insulin            Reviewed items::  EKG reviewed, Medications reviewed, Radiology images reviewed and Labs reviewed  Long catheter::  No Long  DVT prophylaxis pharmacological::  Heparin  Ulcer Prophylaxis::  Not indicated  Antibiotics:  Treating active infection/contamination beyond 24 hours perioperative coverage

## 2017-09-28 NOTE — PROGRESS NOTES
Bedside report received. Scheduled biliary tube replacement. Pt has been NPO. Denies CP, SOB, and N&V, at this time. AOx4, will sign consents. Bed low and locked, call bell within reach. Tele.

## 2017-09-28 NOTE — OR SURGEON
Immediate Post- Operative Note        PostOp Diagnosis: partial biliary drain retraction      Procedure(s): biliary drain change with upsizing      Estimated Blood Loss: Less than 5 ml        Complications: None            9/28/2017     11:50 AM     Abdullahi Wood

## 2017-09-28 NOTE — PROGRESS NOTES
"  Gastroenterology Progress Note     Author: Travis Solis   Date & Time Created: 9/28/2017 10:58 AM    CC: Dislodged PTC drain, history of choledocolithiasis    Interval History:  83 year old patient admitted previously for choledocholithiasis with a Hx of Bilroth II procedure for prior PUD, and was chosen for IR guided biliary stenting performed on 9/8/17 with a 12 F biliary drain culture positive for enteric organisms and treated with antibiotics and subsequent IR guided dilatation of choledochojejunestomy anastamoses and push CBD stone into jejunum presented to the ED for accidental manipulation and partial removal of the biliary drain. Currently AST/ALT/ALKPO4/T bili are all normal. Lipase is elevated at 2597. Patient reports mild diffuse abdominal pain. Last meal was on Sunday. Denies any increase in abdominal pain with food. Denies fevers, chills, nausea, vomiting, or blood in stool.  Past Admission:  Note by Dr. Solis on 9/11/17  \"ERCP by Dr. Mcguire showed a normal ampulla of Vater.  The distal common biliary duct was reportedly normal and the proximal common biliary duct was dilated to 9.6 mm and the common hepatic duct was dilated to 18 mm.  There was no evidence of a stricture or filling defect.  The intrahepatic ducts were moderately dilated and the mass like confluence close to the gallbladder fossa seen on MRCP was felt to be an imaging artifact.  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  ERCP from September 2010 by Dr. Mcguire showed Billroth 2 anatomy.  No sphincterotomy was done.  Patient is unsure about his past surgical history.\"    9/28/2017 - Scheduled for IR revision of PTC.  Feeling well.  No pain, nausea or vomiting.  Appetite okay.  Liver labs non-obstructive.  Lipase completely normalized now.      Review of Systems:  Review of Systems   Constitutional: Negative.    HENT: Negative.    Gastrointestinal: " Negative.    Musculoskeletal: Negative.    Skin: Negative.    Psychiatric/Behavioral: Negative.        Physical Exam:  Physical Exam   Constitutional: He is oriented to person, place, and time. He appears well-developed and well-nourished.   HENT:   Head: Normocephalic and atraumatic.   Eyes: Conjunctivae are normal.   Neck: Neck supple.   Cardiovascular: Normal rate and regular rhythm.    Pulmonary/Chest: Effort normal and breath sounds normal.   Abdominal: Soft. Bowel sounds are normal.   Musculoskeletal: Normal range of motion.   Neurological: He is alert and oriented to person, place, and time.   Skin: Skin is warm and dry.   Psychiatric: He has a normal mood and affect. His behavior is normal. Thought content normal.   Nursing note and vitals reviewed.      Labs:        Invalid input(s): [id number]      Recent Labs      17   SODIUM  138  140   POTASSIUM  3.4*  3.2*   CHLORIDE  110  112   CO2  20  18*   BUN  15  11   CREATININE  1.18  1.05   CALCIUM  7.4*  7.0*     Recent Labs      17   ALTSGPT  6  6   ASTSGOT  11*  13   ALKPHOSPHAT  82  81   TBILIRUBIN  0.6  0.6   LIPASE   --   43   GLUCOSE  145*  111*     Recent Labs      17   RBC  3.06*   HEMOGLOBIN  9.6*   HEMATOCRIT  30.0*   PLATELETCT  293     Recent Labs      17   WBC  7.9   --    NEUTSPOLYS  64.90   --    LYMPHOCYTES  22.00   --    MONOCYTES  8.90   --    EOSINOPHILS  3.20   --    BASOPHILS  0.90   --    ASTSGOT  11*  13   ALTSGPT  6  6   ALKPHOSPHAT  82  81   TBILIRUBIN  0.6  0.6     Hemodynamics:  Temp (24hrs), Av.6 °C (97.9 °F), Min:36.2 °C (97.2 °F), Max:36.9 °C (98.4 °F)  Temperature: 36.2 °C (97.2 °F)  Pulse  Av  Min: 60  Max: 73   Blood Pressure : 158/81     Respiratory:    Respiration: 18, Pulse Oximetry: 98 %        RUL Breath Sounds: Clear, RML Breath Sounds: Diminished, RLL Breath Sounds: Diminished, VENKAT Breath Sounds: Clear, LLL  Breath Sounds: Diminished  Fluids:    Intake/Output Summary (Last 24 hours) at 09/28/17 1058  Last data filed at 09/28/17 0911   Gross per 24 hour   Intake                0 ml   Output             1800 ml   Net            -1800 ml     Weight: 71.9 kg (158 lb 8.2 oz)  GI/Nutrition:  Orders Placed This Encounter   Procedures   • DIET NPO     Standing Status:   Standing     Number of Occurrences:   8     Order Specific Question:   Restrict to:     Answer:   Strict [1]     Medical Decision Making, by Problem:  Active Hospital Problems    Diagnosis   • *Choledocholithiasis [K80.50]   • Diabetes mellitus (CMS-HCC) [E11.9]   • Hypothyroidism [E03.9]       Impression / Plan:  1. Maladjustment of biliary drain - scheduled for revision today  2. Elevated lipase - now completely normalized - unclear what occurred  3. Abdominal pain, minimal -  No severe pain this past week  4. History of prior Billroth II procedure for prior peptic ulcer disease  5. History of peptic ulcer disease  6.  History of remote acute pancreatitis but cannot recall etiology. He is a nondrinker  6. Xarelto anticoagulation therapy for history of deep vein thrombosis     1. Continue Zosyn  2. Agree with IR plan/schedule for upsizing biliary internal/external drain   3. Diet per IR recs    GI WILL SIGN OFF / STAND BY - PLEASE CALL IF ANY CONCERNS      Reviewed items::  Medications reviewed and Labs reviewed

## 2017-09-28 NOTE — CARE PLAN
Problem: Communication  Goal: The ability to communicate needs accurately and effectively will improve  Listened to patients discussion of concerns related to disease process and treatment plan. Discussed with patient concerns, goals and management. Discussed importance of patient reporting new signs and symptoms related to disease process. Patient verbalized understanding and understands importance of communicating needs.  Supported and educated patient by addressing specific questions regarding diagnosis, risks, benefits of pain management treatment.            Problem: Safety  Goal: Will remain free from falls  No slip socks on pt. Call light in place. Pt verbalized understanding of using call light for assistance.

## 2017-09-28 NOTE — PROGRESS NOTES
Received bedside report from Lina FELICIANO, Pt assessed, A&Ox4, Vitals stable, denies pain when still, has back pain when he is trying to get out of bed, Lung sounds clear/diminished in bases, no SOB Noted. Right sided biliary drain intact. Procedure scheduled for tomorrow. Pt updated on plan of care, Call light within reach, personal belongings within reach.  Bed in lowest position. Pt uses call light appropriately.  Pt ambulates via 1 person assist with FWW. Tele monitor on. Will continue to monitor. Hourly rounding in place.

## 2017-09-28 NOTE — PROGRESS NOTES
IR Procedure Note:    Dr. Wood performed biliary catheter exchange and upsize.  The patientt tolerated the procedure well, vital signs stable; ETCo2 baseline 29, with consistent waveform during the procedure.  Report given to JODIE Kohler.  Pt transported to room in stable condition.      [employer] Flexima Biliary Catheter 14Fr x 35cm  REF [id number]   LOT [id number]

## 2017-09-29 PROBLEM — E87.6 HYPOKALEMIA: Status: ACTIVE | Noted: 2017-09-29

## 2017-09-29 LAB
ANION GAP SERPL CALC-SCNC: 10 MMOL/L (ref 0–11.9)
BUN SERPL-MCNC: 10 MG/DL (ref 8–22)
CALCIUM SERPL-MCNC: 7 MG/DL (ref 8.5–10.5)
CHLORIDE SERPL-SCNC: 111 MMOL/L (ref 96–112)
CO2 SERPL-SCNC: 17 MMOL/L (ref 20–33)
CREAT SERPL-MCNC: 1.11 MG/DL (ref 0.5–1.4)
GFR SERPL CREATININE-BSD FRML MDRD: >60 ML/MIN/1.73 M 2
GLUCOSE BLD-MCNC: 134 MG/DL (ref 65–99)
GLUCOSE BLD-MCNC: 137 MG/DL (ref 65–99)
GLUCOSE BLD-MCNC: 179 MG/DL (ref 65–99)
GLUCOSE BLD-MCNC: 188 MG/DL (ref 65–99)
GLUCOSE BLD-MCNC: 303 MG/DL (ref 65–99)
GLUCOSE SERPL-MCNC: 102 MG/DL (ref 65–99)
POTASSIUM SERPL-SCNC: 3.1 MMOL/L (ref 3.6–5.5)
SODIUM SERPL-SCNC: 138 MMOL/L (ref 135–145)

## 2017-09-29 PROCEDURE — A9270 NON-COVERED ITEM OR SERVICE: HCPCS | Performed by: FAMILY MEDICINE

## 2017-09-29 PROCEDURE — 700111 HCHG RX REV CODE 636 W/ 250 OVERRIDE (IP): Performed by: FAMILY MEDICINE

## 2017-09-29 PROCEDURE — 36415 COLL VENOUS BLD VENIPUNCTURE: CPT

## 2017-09-29 PROCEDURE — 700105 HCHG RX REV CODE 258: Performed by: INTERNAL MEDICINE

## 2017-09-29 PROCEDURE — 700102 HCHG RX REV CODE 250 W/ 637 OVERRIDE(OP): Performed by: HOSPITALIST

## 2017-09-29 PROCEDURE — 700111 HCHG RX REV CODE 636 W/ 250 OVERRIDE (IP): Performed by: HOSPITALIST

## 2017-09-29 PROCEDURE — 700105 HCHG RX REV CODE 258: Performed by: FAMILY MEDICINE

## 2017-09-29 PROCEDURE — 80048 BASIC METABOLIC PNL TOTAL CA: CPT

## 2017-09-29 PROCEDURE — 770020 HCHG ROOM/CARE - TELE (206)

## 2017-09-29 PROCEDURE — 99232 SBSQ HOSP IP/OBS MODERATE 35: CPT | Performed by: HOSPITALIST

## 2017-09-29 PROCEDURE — 700102 HCHG RX REV CODE 250 W/ 637 OVERRIDE(OP): Performed by: FAMILY MEDICINE

## 2017-09-29 PROCEDURE — 82962 GLUCOSE BLOOD TEST: CPT | Mod: 91

## 2017-09-29 PROCEDURE — A9270 NON-COVERED ITEM OR SERVICE: HCPCS | Performed by: HOSPITALIST

## 2017-09-29 RX ORDER — SODIUM CHLORIDE 9 MG/ML
INJECTION, SOLUTION INTRAVENOUS
Status: ACTIVE
Start: 2017-09-29 | End: 2017-09-30

## 2017-09-29 RX ORDER — POTASSIUM CHLORIDE 20 MEQ/1
40 TABLET, EXTENDED RELEASE ORAL EVERY 4 HOURS
Status: COMPLETED | OUTPATIENT
Start: 2017-09-29 | End: 2017-09-29

## 2017-09-29 RX ADMIN — LEVOTHYROXINE SODIUM 125 MCG: 125 TABLET ORAL at 05:53

## 2017-09-29 RX ADMIN — HEPARIN SODIUM 5000 UNITS: 5000 INJECTION, SOLUTION INTRAVENOUS; SUBCUTANEOUS at 14:40

## 2017-09-29 RX ADMIN — HEPARIN SODIUM 5000 UNITS: 5000 INJECTION, SOLUTION INTRAVENOUS; SUBCUTANEOUS at 21:42

## 2017-09-29 RX ADMIN — PIPERACILLIN SODIUM AND TAZOBACTAM SODIUM 3.38 G: 3; .375 INJECTION, POWDER, FOR SOLUTION INTRAVENOUS at 12:33

## 2017-09-29 RX ADMIN — OXYCODONE HYDROCHLORIDE 5 MG: 5 TABLET ORAL at 09:57

## 2017-09-29 RX ADMIN — INSULIN LISPRO 1 UNITS: 100 INJECTION, SOLUTION INTRAVENOUS; SUBCUTANEOUS at 17:19

## 2017-09-29 RX ADMIN — POTASSIUM CHLORIDE 40 MEQ: 1500 TABLET, EXTENDED RELEASE ORAL at 12:13

## 2017-09-29 RX ADMIN — PIPERACILLIN SODIUM AND TAZOBACTAM SODIUM 3.38 G: 3; .375 INJECTION, POWDER, FOR SOLUTION INTRAVENOUS at 05:52

## 2017-09-29 RX ADMIN — ACETAMINOPHEN 650 MG: 325 TABLET, FILM COATED ORAL at 05:52

## 2017-09-29 RX ADMIN — ACETAMINOPHEN 650 MG: 325 TABLET, FILM COATED ORAL at 12:14

## 2017-09-29 RX ADMIN — INSULIN LISPRO 1 UNITS: 100 INJECTION, SOLUTION INTRAVENOUS; SUBCUTANEOUS at 12:19

## 2017-09-29 RX ADMIN — PIPERACILLIN SODIUM AND TAZOBACTAM SODIUM 3.38 G: 3; .375 INJECTION, POWDER, FOR SOLUTION INTRAVENOUS at 17:17

## 2017-09-29 RX ADMIN — OXYCODONE HYDROCHLORIDE 5 MG: 5 TABLET ORAL at 22:36

## 2017-09-29 RX ADMIN — POTASSIUM CHLORIDE 40 MEQ: 1500 TABLET, EXTENDED RELEASE ORAL at 08:44

## 2017-09-29 RX ADMIN — HEPARIN SODIUM 5000 UNITS: 5000 INJECTION, SOLUTION INTRAVENOUS; SUBCUTANEOUS at 05:52

## 2017-09-29 RX ADMIN — DEXTROSE AND SODIUM CHLORIDE: 5; 900 INJECTION, SOLUTION INTRAVENOUS at 05:52

## 2017-09-29 ASSESSMENT — PATIENT HEALTH QUESTIONNAIRE - PHQ9
SUM OF ALL RESPONSES TO PHQ QUESTIONS 1-9: 0
1. LITTLE INTEREST OR PLEASURE IN DOING THINGS: NOT AT ALL
SUM OF ALL RESPONSES TO PHQ9 QUESTIONS 1 AND 2: 0
2. FEELING DOWN, DEPRESSED, IRRITABLE, OR HOPELESS: NOT AT ALL

## 2017-09-29 ASSESSMENT — PAIN SCALES - GENERAL
PAINLEVEL_OUTOF10: 0
PAINLEVEL_OUTOF10: 8
PAINLEVEL_OUTOF10: 7
PAINLEVEL_OUTOF10: 8
PAINLEVEL_OUTOF10: 4
PAINLEVEL_OUTOF10: 7
PAINLEVEL_OUTOF10: 0

## 2017-09-29 ASSESSMENT — ENCOUNTER SYMPTOMS
COUGH: 0
HEADACHES: 0
BACK PAIN: 0
MYALGIAS: 0
ABDOMINAL PAIN: 0
FEVER: 0
FOCAL WEAKNESS: 0
DEPRESSION: 0
VOMITING: 0
NERVOUS/ANXIOUS: 0
NAUSEA: 0
DIZZINESS: 0
CHILLS: 0
PALPITATIONS: 0
SHORTNESS OF BREATH: 0
WEAKNESS: 1

## 2017-09-29 ASSESSMENT — COGNITIVE AND FUNCTIONAL STATUS - GENERAL
MOBILITY SCORE: 24
DAILY ACTIVITIY SCORE: 24
SUGGESTED CMS G CODE MODIFIER MOBILITY: CH
SUGGESTED CMS G CODE MODIFIER DAILY ACTIVITY: CH

## 2017-09-29 ASSESSMENT — COPD QUESTIONNAIRES
COPD SCREENING SCORE: 0
HAVE YOU SMOKED AT LEAST 100 CIGARETTES IN YOUR ENTIRE LIFE: NO/DON'T KNOW
DO YOU EVER COUGH UP ANY MUCUS OR PHLEGM?: NO/ONLY WITH OCCASIONAL COLDS OR INFECTIONS
DURING THE PAST 4 WEEKS HOW MUCH DID YOU FEEL SHORT OF BREATH: NONE/LITTLE OF THE TIME

## 2017-09-29 ASSESSMENT — LIFESTYLE VARIABLES
DO YOU DRINK ALCOHOL: NO
DO YOU DRINK ALCOHOL: NO

## 2017-09-29 NOTE — PROGRESS NOTES
Student and nurse entered pt room for medication administration. Medications given to pt. Pt report of neck pain due to stiffness 7/10. Heat pack give to patient to alleviate pain.

## 2017-09-29 NOTE — CARE PLAN
Problem: Communication  Goal: The ability to communicate needs accurately and effectively will improve  Listened to patients discussion of concerns related to disease process and treatment plan. Discussed with patient concerns, goals and management. Discussed importance of patient reporting new signs and symptoms related to disease process. Patient verbalized understanding and understands importance of communicating needs.  Supported and educated patient by addressing specific questions regarding diagnosis, risks, benefits of pain management treatment.            Problem: Safety  Goal: Will remain free from falls  No slip socks on pt. Call light in place. Pt verbalized understanding of using call light for assistance, and utilizes it appropriately.

## 2017-09-29 NOTE — PROGRESS NOTES
Student and reference nurse entered pt room for bedside report. Pt found awake in bed. Report received. Pt reported slight neck stiffness. Heat pack given. Pt left in room with call light in reach.

## 2017-09-29 NOTE — PROGRESS NOTES
[facility] Hospitalist Progress Note    Date of Service: 2017    Chief Complaint  83 y.o. male admitted 2017 with biliary drain leakage. Patient had a biliary drain that was placed 2 weeks ago. He states that he was sitting in his recliner and as he was attempting to get up the draining a clot and when he awakened the next morning his sugar was wet with bile.    Interval Problem Update  Full liquids tolerated. Feels weak but appetite is good. No complaints of pain. No acute overnight events.    Consultants/Specialty  Gastroenterology  Interventional radiology    Disposition  Home when medically cleared.        Review of Systems   Constitutional: Negative for chills, fever and malaise/fatigue.   Respiratory: Negative for cough and shortness of breath.    Cardiovascular: Negative for chest pain, palpitations and leg swelling.   Gastrointestinal: Negative for abdominal pain (generalized, mild), nausea and vomiting.   Genitourinary: Negative for dysuria and frequency.   Musculoskeletal: Negative for back pain and myalgias.   Skin: Negative.    Neurological: Positive for weakness. Negative for dizziness, focal weakness and headaches.   Psychiatric/Behavioral: Negative for depression. The patient is not nervous/anxious.       Physical Exam  Laboratory/Imaging   Hemodynamics  Temp (24hrs), Av.5 °C (97.7 °F), Min:36.2 °C (97.2 °F), Max:36.8 °C (98.3 °F)   Temperature: 36.6 °C (97.9 °F)  Pulse  Av.1  Min: 57  Max: 73 Heart Rate (Monitored): (!) 58  Blood Pressure : 145/70, NIBP: (!) 161/72      Respiratory      Respiration: 16, Pulse Oximetry: 96 %        RUL Breath Sounds: Clear, RML Breath Sounds: Diminished, RLL Breath Sounds: Diminished, VENKAT Breath Sounds: Clear, LLL Breath Sounds: Diminished    Fluids    Intake/Output Summary (Last 24 hours) at 17  Last data filed at 17 0911   Gross per 24 hour   Intake                0 ml   Output             1450 ml   Net            -1450 ml        Nutrition  Orders Placed This Encounter   Procedures   • DIET ORDER     Standing Status:   Standing     Number of Occurrences:   1     Order Specific Question:   Diet:     Answer:   Full Liquid [11]     Physical Exam   Constitutional: He is oriented to person, place, and time. He appears well-developed and well-nourished.   HENT:   Head: Normocephalic and atraumatic.   Eyes: Conjunctivae and EOM are normal. No scleral icterus.   Neck: Normal range of motion. Neck supple.   Cardiovascular: Normal rate and regular rhythm.    Pulmonary/Chest: Effort normal and breath sounds normal. No respiratory distress. He has no rales.   Abdominal: Soft. Bowel sounds are normal. He exhibits no distension. There is no tenderness.   Right-sided biliary drain   Musculoskeletal: He exhibits no edema.   Neurological: He is alert and oriented to person, place, and time.   Skin: Skin is warm and dry.   Nursing note and vitals reviewed.      Recent Labs      09/26/17   0430   WBC  7.9   RBC  3.06*   HEMOGLOBIN  9.6*   HEMATOCRIT  30.0*   MCV  98.0*   MCH  31.4   MCHC  32.0*   RDW  51.7*   PLATELETCT  293   MPV  12.8     Recent Labs      09/26/17   0430  09/28/17   0311   SODIUM  138  140   POTASSIUM  3.4*  3.2*   CHLORIDE  110  112   CO2  20  18*   GLUCOSE  145*  111*   BUN  15  11   CREATININE  1.18  1.05   CALCIUM  7.4*  7.0*                 KUB   Malpositioned internal-external biliary drainage catheter with catheter side holes clearly outside the abdominal cavity. The pigtail loop of the catheter is  probably near the level of the choledochojejunostomy anastomosis.   Assessment/Plan     * Choledocholithiasis- (present on admission)   Assessment & Plan    Patient had a biliary draina few weeks ago and it became dislodged. Elevated lipase with concern for obstruction. KUB showed malpositioned drain.  - s/p replacement with larger drain  - advance diet as tolerated  - PT and ambulate with nursing  - Interventional radiology has been  consulted  - continue zosyn  - GI on board, greatly appreciate their recs        Hypothyroidism- (present on admission)   Assessment & Plan    Synthroid 125 µg daily        Diabetes mellitus (CMS-HCC)- (present on admission)   Assessment & Plan    A1C 7.1%.  - Continue sliding scale insulin            Reviewed items::  EKG reviewed, Medications reviewed, Radiology images reviewed and Labs reviewed  Long catheter::  No Long  DVT prophylaxis pharmacological::  Heparin  Ulcer Prophylaxis::  Not indicated  Antibiotics:  Treating active infection/contamination beyond 24 hours perioperative coverage

## 2017-09-29 NOTE — PROGRESS NOTES
Received bedside report from Jose F RN, Pt assessed, A&Ox4, Vitals stable, denie pain, Lung sounds clear/diminished, no SOB Noted. Right sided biliary drain replaced today, dressing in place.  Pt updated on plan of care, Call light within reach, personal belongings within reach.  Bed in lowest position.  Pt ambulates via 1 person assist with FWW. Tele monitor on. Will continue to monitor.

## 2017-09-29 NOTE — CARE PLAN
Problem: Safety  Goal: Will remain free from injury  Pt mobility assessed at the beginning of the shift. Fall precautions in place, non-slip socks on, bed in lowest, locked position, and call light is within reach. Pt educated to call for assistance, and verbalizes understanding.    Problem: Knowledge Deficit  Goal: Knowledge of disease process/condition, treatment plan, diagnostic tests, and medications will improve  Educated pt on disease process, treatment plan, and reason for medications he is on.

## 2017-09-29 NOTE — PROGRESS NOTES
Pt sitting up in bed resting. Complaint of neck pain 4/10. Treated with medication and heat packs. Pt AOx4. Pt left in bed with lowest position and two bed rails up. Call light left within reach.

## 2017-09-29 NOTE — PROGRESS NOTES
Pt given pain medication to alleviate neck pain 8/10. At 1045 student returned to room to see effect of pain medication with use of heat packs. Pt now reports pain 5/10. Will reassess pain and provide further interventions.

## 2017-09-30 ENCOUNTER — PATIENT OUTREACH (OUTPATIENT)
Dept: HEALTH INFORMATION MANAGEMENT | Facility: OTHER | Age: 82
End: 2017-09-30

## 2017-09-30 LAB
ANION GAP SERPL CALC-SCNC: 7 MMOL/L (ref 0–11.9)
BUN SERPL-MCNC: 10 MG/DL (ref 8–22)
CALCIUM SERPL-MCNC: 7.2 MG/DL (ref 8.5–10.5)
CHLORIDE SERPL-SCNC: 114 MMOL/L (ref 96–112)
CO2 SERPL-SCNC: 16 MMOL/L (ref 20–33)
CREAT SERPL-MCNC: 1.35 MG/DL (ref 0.5–1.4)
GFR SERPL CREATININE-BSD FRML MDRD: 50 ML/MIN/1.73 M 2
GLUCOSE BLD-MCNC: 114 MG/DL (ref 65–99)
GLUCOSE BLD-MCNC: 137 MG/DL (ref 65–99)
GLUCOSE BLD-MCNC: 150 MG/DL (ref 65–99)
GLUCOSE BLD-MCNC: 174 MG/DL (ref 65–99)
GLUCOSE SERPL-MCNC: 129 MG/DL (ref 65–99)
MAGNESIUM SERPL-MCNC: 1.6 MG/DL (ref 1.5–2.5)
POTASSIUM SERPL-SCNC: 4 MMOL/L (ref 3.6–5.5)
SODIUM SERPL-SCNC: 137 MMOL/L (ref 135–145)

## 2017-09-30 PROCEDURE — 700105 HCHG RX REV CODE 258: Performed by: FAMILY MEDICINE

## 2017-09-30 PROCEDURE — 700111 HCHG RX REV CODE 636 W/ 250 OVERRIDE (IP): Performed by: HOSPITALIST

## 2017-09-30 PROCEDURE — 36415 COLL VENOUS BLD VENIPUNCTURE: CPT

## 2017-09-30 PROCEDURE — 97161 PT EVAL LOW COMPLEX 20 MIN: CPT

## 2017-09-30 PROCEDURE — 700102 HCHG RX REV CODE 250 W/ 637 OVERRIDE(OP): Performed by: FAMILY MEDICINE

## 2017-09-30 PROCEDURE — 700111 HCHG RX REV CODE 636 W/ 250 OVERRIDE (IP): Performed by: FAMILY MEDICINE

## 2017-09-30 PROCEDURE — A9270 NON-COVERED ITEM OR SERVICE: HCPCS | Performed by: FAMILY MEDICINE

## 2017-09-30 PROCEDURE — G8979 MOBILITY GOAL STATUS: HCPCS | Mod: CI

## 2017-09-30 PROCEDURE — 80048 BASIC METABOLIC PNL TOTAL CA: CPT

## 2017-09-30 PROCEDURE — 83735 ASSAY OF MAGNESIUM: CPT

## 2017-09-30 PROCEDURE — 99232 SBSQ HOSP IP/OBS MODERATE 35: CPT | Performed by: HOSPITALIST

## 2017-09-30 PROCEDURE — G8980 MOBILITY D/C STATUS: HCPCS | Mod: CI

## 2017-09-30 PROCEDURE — 82962 GLUCOSE BLOOD TEST: CPT | Mod: 91

## 2017-09-30 PROCEDURE — 770020 HCHG ROOM/CARE - TELE (206)

## 2017-09-30 PROCEDURE — G8978 MOBILITY CURRENT STATUS: HCPCS | Mod: CI

## 2017-09-30 RX ORDER — MAGNESIUM SULFATE HEPTAHYDRATE 40 MG/ML
4 INJECTION, SOLUTION INTRAVENOUS ONCE
Status: COMPLETED | OUTPATIENT
Start: 2017-09-30 | End: 2017-09-30

## 2017-09-30 RX ADMIN — HEPARIN SODIUM 5000 UNITS: 5000 INJECTION, SOLUTION INTRAVENOUS; SUBCUTANEOUS at 06:41

## 2017-09-30 RX ADMIN — INSULIN LISPRO 1 UNITS: 100 INJECTION, SOLUTION INTRAVENOUS; SUBCUTANEOUS at 11:59

## 2017-09-30 RX ADMIN — PIPERACILLIN SODIUM AND TAZOBACTAM SODIUM 3.38 G: 3; .375 INJECTION, POWDER, FOR SOLUTION INTRAVENOUS at 06:42

## 2017-09-30 RX ADMIN — PIPERACILLIN SODIUM AND TAZOBACTAM SODIUM 3.38 G: 3; .375 INJECTION, POWDER, FOR SOLUTION INTRAVENOUS at 01:00

## 2017-09-30 RX ADMIN — PIPERACILLIN SODIUM AND TAZOBACTAM SODIUM 3.38 G: 3; .375 INJECTION, POWDER, FOR SOLUTION INTRAVENOUS at 22:21

## 2017-09-30 RX ADMIN — PIPERACILLIN SODIUM AND TAZOBACTAM SODIUM 3.38 G: 3; .375 INJECTION, POWDER, FOR SOLUTION INTRAVENOUS at 17:50

## 2017-09-30 RX ADMIN — HEPARIN SODIUM 5000 UNITS: 5000 INJECTION, SOLUTION INTRAVENOUS; SUBCUTANEOUS at 15:38

## 2017-09-30 RX ADMIN — MAGNESIUM SULFATE IN WATER 4 G: 40 INJECTION, SOLUTION INTRAVENOUS at 08:36

## 2017-09-30 RX ADMIN — LEVOTHYROXINE SODIUM 125 MCG: 125 TABLET ORAL at 06:41

## 2017-09-30 RX ADMIN — PIPERACILLIN SODIUM AND TAZOBACTAM SODIUM 3.38 G: 3; .375 INJECTION, POWDER, FOR SOLUTION INTRAVENOUS at 12:30

## 2017-09-30 RX ADMIN — HEPARIN SODIUM 5000 UNITS: 5000 INJECTION, SOLUTION INTRAVENOUS; SUBCUTANEOUS at 20:01

## 2017-09-30 ASSESSMENT — ENCOUNTER SYMPTOMS
NERVOUS/ANXIOUS: 0
DEPRESSION: 0
DIZZINESS: 0
CHILLS: 0
VOMITING: 0
WEAKNESS: 1
NAUSEA: 0
SHORTNESS OF BREATH: 0
COUGH: 0
FOCAL WEAKNESS: 0
ABDOMINAL PAIN: 0
FEVER: 0
PALPITATIONS: 0
BACK PAIN: 0
HEADACHES: 0
MYALGIAS: 0

## 2017-09-30 ASSESSMENT — COGNITIVE AND FUNCTIONAL STATUS - GENERAL
SUGGESTED CMS G CODE MODIFIER MOBILITY: CI
MOBILITY SCORE: 23
CLIMB 3 TO 5 STEPS WITH RAILING: A LITTLE

## 2017-09-30 ASSESSMENT — GAIT ASSESSMENTS
DISTANCE (FEET): 400
GAIT LEVEL OF ASSIST: SUPERVISED
ASSISTIVE DEVICE: FRONT WHEEL WALKER

## 2017-09-30 ASSESSMENT — PAIN SCALES - GENERAL
PAINLEVEL_OUTOF10: 0

## 2017-09-30 NOTE — PROGRESS NOTES
Care of patient assumed after report.Bed alarm on, call light in reach, fall precautions in place. Discussed plan of care with patient, all questions answered. Will continue to monitor.

## 2017-09-30 NOTE — PROGRESS NOTES
[facility] Hospitalist Progress Note    Date of Service: 2017    Chief Complaint  83 y.o. male admitted 2017 with biliary drain leakage. Patient had a biliary drain that was placed 2 weeks ago. He states that he was sitting in his recliner and as he was attempting to get up the draining a clot and when he awakened the next morning his sheet was wet with bile.    Interval Problem Update  Tolerating chopped diet. Feels weak but appetite is good. No complaints of pain. No acute overnight events.    Consultants/Specialty  Gastroenterology  Interventional radiology    Disposition  Home with family tomorrow.        Review of Systems   Constitutional: Negative for chills, fever and malaise/fatigue.   Respiratory: Negative for cough and shortness of breath.    Cardiovascular: Negative for chest pain, palpitations and leg swelling.   Gastrointestinal: Negative for abdominal pain (generalized, mild), nausea and vomiting.   Genitourinary: Negative for dysuria and frequency.   Musculoskeletal: Negative for back pain and myalgias.   Skin: Negative.    Neurological: Positive for weakness. Negative for dizziness, focal weakness and headaches.   Psychiatric/Behavioral: Negative for depression. The patient is not nervous/anxious.       Physical Exam  Laboratory/Imaging   Hemodynamics  Temp (24hrs), Av.8 °C (98.2 °F), Min:36.7 °C (98 °F), Max:36.9 °C (98.5 °F)   Temperature: 36.7 °C (98.1 °F)  Pulse  Av.9  Min: 57  Max: 73   Blood Pressure : 141/81      Respiratory      Respiration: 17, Pulse Oximetry: 96 %        RUL Breath Sounds: Clear, RML Breath Sounds: Clear, RLL Breath Sounds: Diminished, VENKAT Breath Sounds: Diminished, LLL Breath Sounds: Diminished    Fluids    Intake/Output Summary (Last 24 hours) at 17 1425  Last data filed at 17 0600   Gross per 24 hour   Intake              800 ml   Output              900 ml   Net             -100 ml       Nutrition  Orders Placed This Encounter   Procedures   •  DIET ORDER     Standing Status:   Standing     Number of Occurrences:   1     Order Specific Question:   Diet:     Answer:   Diabetic [3]     Order Specific Question:   Texture/Fiber modifications:     Answer:   Dysphagia 2(Pureed/Chopped)specify fluid consistency(question 6) [2]     Physical Exam   Constitutional: He is oriented to person, place, and time. He appears well-developed and well-nourished.   HENT:   Head: Normocephalic and atraumatic.   Eyes: Conjunctivae and EOM are normal. No scleral icterus.   Neck: Normal range of motion. Neck supple.   Cardiovascular: Normal rate and regular rhythm.    Pulmonary/Chest: Effort normal and breath sounds normal. No respiratory distress. He has no rales.   Abdominal: Soft. Bowel sounds are normal. He exhibits no distension. There is no tenderness.   Right-sided biliary drain, minimal output   Musculoskeletal: He exhibits no edema.   Neurological: He is alert and oriented to person, place, and time.   Skin: Skin is warm and dry.   Nursing note and vitals reviewed.          Recent Labs      09/28/17   0311  09/29/17   0211  09/30/17   0130   SODIUM  140  138  137   POTASSIUM  3.2*  3.1*  4.0   CHLORIDE  112  111  114*   CO2  18*  17*  16*   GLUCOSE  111*  102*  129*   BUN  11  10  10   CREATININE  1.05  1.11  1.35   CALCIUM  7.0*  7.0*  7.2*                 KUB   Malpositioned internal-external biliary drainage catheter with catheter side holes clearly outside the abdominal cavity. The pigtail loop of the catheter is  probably near the level of the choledochojejunostomy anastomosis.   Assessment/Plan     * Choledocholithiasis- (present on admission)   Assessment & Plan    Patient had a biliary draina few weeks ago and it became dislodged. Elevated lipase with concern for obstruction. KUB showed malpositioned drain.  - s/p replacement with larger drain  - advance diet as tolerated, currently on chopped  - PT and ambulate with nursing  - continue zosyn  - GI signed off         DVT, recurrent, lower extremity, chronic (CMS-HCC)- (present on admission)   Assessment & Plan    On xarelto. Continue home regimen.        Diabetes mellitus (CMS-HCC)- (present on admission)   Assessment & Plan    A1C 7.1%.  - Continue sliding scale insulin        Hypokalemia- (present on admission)   Assessment & Plan    K 4 today. Starting to tolerate diet, improving. Mag 1.6 today.  - replete as needed        Hypothyroidism- (present on admission)   Assessment & Plan    Stable.   - Synthroid 125 µg daily            Reviewed items::  EKG reviewed, Medications reviewed, Radiology images reviewed and Labs reviewed  Long catheter::  No Long  DVT prophylaxis pharmacological::  Heparin  Ulcer Prophylaxis::  Not indicated  Antibiotics:  Treating active infection/contamination beyond 24 hours perioperative coverage

## 2017-09-30 NOTE — PROGRESS NOTES
[facility] Hospitalist Progress Note    Date of Service: 2017    Chief Complaint  83 y.o. male admitted 2017 with biliary drain leakage. Patient had a biliary drain that was placed 2 weeks ago. He states that he was sitting in his recliner and as he was attempting to get up the draining a clot and when he awakened the next morning his sugar was wet with bile.    Interval Problem Update  Continues to tolerate full liquids. Feels weak but appetite is good. No complaints of pain. No acute overnight events.    Consultants/Specialty  Gastroenterology  Interventional radiology    Disposition  Home when able to ambulate and tolerate oral diet.        Review of Systems   Constitutional: Negative for chills, fever and malaise/fatigue.   Respiratory: Negative for cough and shortness of breath.    Cardiovascular: Negative for chest pain, palpitations and leg swelling.   Gastrointestinal: Negative for abdominal pain (generalized, mild), nausea and vomiting.   Genitourinary: Negative for dysuria and frequency.   Musculoskeletal: Negative for back pain and myalgias.   Skin: Negative.    Neurological: Positive for weakness. Negative for dizziness, focal weakness and headaches.   Psychiatric/Behavioral: Negative for depression. The patient is not nervous/anxious.       Physical Exam  Laboratory/Imaging   Hemodynamics  Temp (24hrs), Av.5 °C (97.7 °F), Min:36.3 °C (97.4 °F), Max:36.6 °C (97.9 °F)   Temperature: 36.6 °C (97.9 °F)  Pulse  Av.3  Min: 57  Max: 73   Blood Pressure : 146/79      Respiratory      Respiration: 18, Pulse Oximetry: 97 %        RUL Breath Sounds: Clear, RML Breath Sounds: Clear;Diminished, RLL Breath Sounds: Clear;Diminished, VENKAT Breath Sounds: Clear, LLL Breath Sounds: Clear;Diminished    Fluids    Intake/Output Summary (Last 24 hours) at 17 1815  Last data filed at 17 1742   Gross per 24 hour   Intake             2360 ml   Output             1025 ml   Net             1335 ml        Nutrition  Orders Placed This Encounter   Procedures   • DIET ORDER     Standing Status:   Standing     Number of Occurrences:   1     Order Specific Question:   Diet:     Answer:   Diabetic [3]     Order Specific Question:   Texture/Fiber modifications:     Answer:   Dysphagia 2(Pureed/Chopped)specify fluid consistency(question 6) [2]     Physical Exam   Constitutional: He is oriented to person, place, and time. He appears well-developed and well-nourished.   HENT:   Head: Normocephalic and atraumatic.   Eyes: Conjunctivae and EOM are normal. No scleral icterus.   Neck: Normal range of motion. Neck supple.   Cardiovascular: Normal rate and regular rhythm.    Pulmonary/Chest: Effort normal and breath sounds normal. No respiratory distress. He has no rales.   Abdominal: Soft. Bowel sounds are normal. He exhibits no distension. There is no tenderness.   Right-sided biliary drain, minimal output   Musculoskeletal: He exhibits no edema.   Neurological: He is alert and oriented to person, place, and time.   Skin: Skin is warm and dry.   Nursing note and vitals reviewed.          Recent Labs      09/28/17   0311  09/29/17   0211   SODIUM  140  138   POTASSIUM  3.2*  3.1*   CHLORIDE  112  111   CO2  18*  17*   GLUCOSE  111*  102*   BUN  11  10   CREATININE  1.05  1.11   CALCIUM  7.0*  7.0*                 KUB   Malpositioned internal-external biliary drainage catheter with catheter side holes clearly outside the abdominal cavity. The pigtail loop of the catheter is  probably near the level of the choledochojejunostomy anastomosis.   Assessment/Plan     * Choledocholithiasis- (present on admission)   Assessment & Plan    Patient had a biliary draina few weeks ago and it became dislodged. Elevated lipase with concern for obstruction. KUB showed malpositioned drain.  - s/p replacement with larger drain  - advance diet as tolerated, currently on chopped  - PT and ambulate with nursing  - continue zosyn  - GI on board,  greatly appreciate their recs        DVT, recurrent, lower extremity, chronic (CMS-HCC)- (present on admission)   Assessment & Plan    On xarelto. Continue home regimen.        Diabetes mellitus (CMS-[facility])- (present on admission)   Assessment & Plan    A1C 7.1%.  - Continue sliding scale insulin        Hypokalemia- (present on admission)   Assessment & Plan    K 3.1 today. Starting to tolerate diet, expect improvement  - replete as needed        Hypothyroidism- (present on admission)   Assessment & Plan    Synthroid 125 µg daily            Reviewed items::  EKG reviewed, Medications reviewed, Radiology images reviewed and Labs reviewed  Long catheter::  No Long  DVT prophylaxis pharmacological::  Heparin  Ulcer Prophylaxis::  Not indicated  Antibiotics:  Treating active infection/contamination beyond 24 hours perioperative coverage

## 2017-09-30 NOTE — THERAPY
"Physical Therapy Evaluation completed.   Bed Mobility:  Supine to Sit: Supervised  Transfers: Sit to Stand: Supervised  Gait: Level Of Assist: Supervised with Front-Wheel Walker       Plan of Care: Patient with no further skilled PT needs in the acute care setting at this time and Patient demonstrates safety with mobility in this environment at this time.   Discharge Recommendations: Equipment: No Equipment Needed. Post-acute therapy Currently anticipate no further skilled therapy needs once patient is discharged from the inpatient setting.    Pt presents to acute PT s/p drain leakage and subsequet debility. Pt presents with good mobility for transfers and ambulation. Pt may use FWW upon d/c with progression to ambulation without AD per PLOF. Pt will no longer require skilled acute PT and will d/c home with assist from family/spouse.    See \"Rehab Therapy-Acute\" Patient Summary Report for complete documentation.     "

## 2017-09-30 NOTE — PROGRESS NOTES
A&O x4. VSS. SR w/ int PVC on monitor. Tolerating diet. Pain controlled via PRN management. Denies N/V. FSBG WNL. 100cc output over 12 hrs via biliary drain. Good UOP. +BM. Resting comfortably.

## 2017-09-30 NOTE — CARE PLAN
Problem: Knowledge Deficit  Goal: Knowledge of disease process/condition, treatment plan, diagnostic tests, and medications will improve  Outcome: PROGRESSING AS EXPECTED  Review daily POC with patient. Address all questions and/or concerns in timely, appropriate manner.    Problem: Pain Management  Goal: Pain level will decrease to patient's comfort goal  Outcome: PROGRESSING AS EXPECTED  Patient reports chronic and acute pain controlled via prescribed PRN management.

## 2017-10-01 VITALS
TEMPERATURE: 97.8 F | DIASTOLIC BLOOD PRESSURE: 71 MMHG | OXYGEN SATURATION: 97 % | RESPIRATION RATE: 18 BRPM | BODY MASS INDEX: 20.63 KG/M2 | HEIGHT: 72 IN | SYSTOLIC BLOOD PRESSURE: 157 MMHG | WEIGHT: 152.34 LBS | HEART RATE: 61 BPM

## 2017-10-01 PROBLEM — E87.6 HYPOKALEMIA: Status: RESOLVED | Noted: 2017-09-29 | Resolved: 2017-10-01

## 2017-10-01 LAB — GLUCOSE BLD-MCNC: 97 MG/DL (ref 65–99)

## 2017-10-01 PROCEDURE — 700111 HCHG RX REV CODE 636 W/ 250 OVERRIDE (IP): Performed by: FAMILY MEDICINE

## 2017-10-01 PROCEDURE — 700111 HCHG RX REV CODE 636 W/ 250 OVERRIDE (IP): Performed by: HOSPITALIST

## 2017-10-01 PROCEDURE — 82962 GLUCOSE BLOOD TEST: CPT

## 2017-10-01 PROCEDURE — A9270 NON-COVERED ITEM OR SERVICE: HCPCS | Performed by: FAMILY MEDICINE

## 2017-10-01 PROCEDURE — 99239 HOSP IP/OBS DSCHRG MGMT >30: CPT | Performed by: HOSPITALIST

## 2017-10-01 PROCEDURE — 700102 HCHG RX REV CODE 250 W/ 637 OVERRIDE(OP): Performed by: FAMILY MEDICINE

## 2017-10-01 PROCEDURE — 700105 HCHG RX REV CODE 258: Performed by: FAMILY MEDICINE

## 2017-10-01 RX ADMIN — HEPARIN SODIUM 5000 UNITS: 5000 INJECTION, SOLUTION INTRAVENOUS; SUBCUTANEOUS at 05:36

## 2017-10-01 RX ADMIN — LEVOTHYROXINE SODIUM 125 MCG: 125 TABLET ORAL at 05:35

## 2017-10-01 RX ADMIN — PIPERACILLIN SODIUM AND TAZOBACTAM SODIUM 3.38 G: 3; .375 INJECTION, POWDER, FOR SOLUTION INTRAVENOUS at 05:36

## 2017-10-01 RX ADMIN — OXYCODONE HYDROCHLORIDE 5 MG: 5 TABLET ORAL at 06:49

## 2017-10-01 RX ADMIN — ACETAMINOPHEN 650 MG: 325 TABLET, FILM COATED ORAL at 05:35

## 2017-10-01 ASSESSMENT — PAIN SCALES - GENERAL
PAINLEVEL_OUTOF10: 7
PAINLEVEL_OUTOF10: 0
PAINLEVEL_OUTOF10: 0
PAINLEVEL_OUTOF10: 2
PAINLEVEL_OUTOF10: 7
PAINLEVEL_OUTOF10: 0

## 2017-10-01 NOTE — PROGRESS NOTES
Bedside shift report taken from Kin, RN. Patient taken to bathroom, BM x1. Helped back to bed. Needs assessed; nothing needed at this time. Bed locked and in lowest position. Side rails up x2. Call light and phone left within reach. Communication board updated. Will continue to monitor.

## 2017-10-01 NOTE — CARE PLAN
Problem: Knowledge Deficit  Goal: Knowledge of disease process/condition, treatment plan, diagnostic tests, and medications will improve    Intervention: Explain information regarding disease process/condition, treatment plan, diagnostic tests, and medications and document in education  Explained and discussed information regarding plan of care, discharge process, and medications. All questions answered.

## 2017-10-01 NOTE — PROGRESS NOTES
Care of patient assumed after report.Patient sitting up watching TV. Bed alarm on, call light in reach, fall precautions in place. Will continue to monitor.

## 2017-10-01 NOTE — DISCHARGE INSTRUCTIONS
Discharge Instructions    Discharged to home by car with relative. Discharged via wheelchair, hospital escort: Yes.  Special equipment needed: Not Applicable    Be sure to schedule a follow-up appointment with your primary care doctor or any specialists as instructed.     Discharge Plan:   Diet Plan: Discussed  Activity Level: Discussed  Confirmed Follow up Appointment: Patient to Call and Schedule Appointment  Confirmed Symptoms Management: Discussed  Medication Reconciliation Updated: Yes  Influenza Vaccine Indication: Not indicated: Previously immunized this influenza season and > 8 years of age    I understand that a diet low in cholesterol, fat, and sodium is recommended for good health. Unless I have been given specific instructions below for another diet, I accept this instruction as my diet prescription.     Special Instructions: None    · Is patient discharged on Warfarin / Coumadin?   No     · Is patient Post Blood Transfusion?  No    Depression / Suicide Risk    As you are discharged from this [facility] facility, it is important to learn how to keep safe from harming yourself.    Recognize the warning signs:  · Abrupt changes in personality, positive or negative- including increase in energy   · Giving away possessions  · Change in eating patterns- significant weight changes-  positive or negative  · Change in sleeping patterns- unable to sleep or sleeping all the time   · Unwillingness or inability to communicate  · Depression  · Unusual sadness, discouragement and loneliness  · Talk of wanting to die  · Neglect of personal appearance   · Rebelliousness- reckless behavior  · Withdrawal from people/activities they love  · Confusion- inability to concentrate     If you or a loved one observes any of these behaviors or has concerns about self-harm, here's what you can do:  · Talk about it- your feelings and reasons for harming yourself  · Remove any means that you might use to hurt yourself (examples:  pills, rope, extension cords, firearm)  · Get professional help from the community (Mental Health, Substance Abuse, psychological counseling)  · Do not be alone:Call your Safe Contact- someone whom you trust who will be there for you.  · Call your local CRISIS HOTLINE 832-1217 or 346-781-8010  · Call your local Children's Mobile Crisis Response Team [place] (230) 675-8394 or www.ThinkSmart  · Call the toll free National Suicide Prevention Hotlines   · National Suicide Prevention Lifeline 282-113-SJQV (1089)  · National Hope Line Network 800-SUICIDE (318-4295)

## 2017-10-01 NOTE — PROGRESS NOTES
Discharge instructions given to patient at bedside, verbalizes understanding and states plans for follow-up with PCP and Dr. Melo.Home medication review, post-discharge activity level and worsening of symptoms needing follow-up care discussed. Telemetry monitor/IV cathlon removed. All belongings accounted for, all questions answered at this time. Patient waiting for wife from [place] for ride home

## 2017-10-01 NOTE — CARE PLAN
Problem: Safety  Goal: Will remain free from falls    Intervention: Implement fall precautions   09/30/17 2000   OTHER   Environmental Precautions Treaded Slipper Socks on Patient;Personal Belongings, Wastebasket, Call Bell etc. in Easy Reach;Report Given to Other Health Care Providers Regarding Fall Risk;Bed in Low Position;Communication Sign for Patients & Families;Mobility Assessed & Appropriate Sign Placed   IV Pole on Same Side of Bed as Bathroom Yes   Bedrails Bedrails Closest to Bathroom Down   Chair/Bed Strip Alarm Patient Educated Regarding Fall Risk and Need for Bed Alarm, Understands and Continues to Refuse     Frequent rounds made; room close to nurse's station

## 2017-10-01 NOTE — CARE PLAN
Problem: Venous Thromboembolism (VTW)/Deep Vein Thrombosis (DVT) Prevention:  Goal: Patient will participate in Venous Thrombosis (VTE)/Deep Vein Thrombosis (DVT)Prevention Measures    Intervention: Assess and monitor for anticoagulation complications  Patient receiving SQ heparin      Problem: Pain Management  Goal: Pain level will decrease to patient's comfort goal    Intervention: Follow pain managment plan developed in collaboration with patient and Interdisciplinary Team  Patient denies having any pain at this time. Will continue to reassess.

## 2017-10-01 NOTE — DISCHARGE SUMMARY
CHIEF COMPLAINT ON ADMISSION  No chief complaint on file.      CODE STATUS  Prior    HPI & HOSPITAL COURSE  This is a 83 y.o. male with recent biliary drain placement for cholangitis, here with displacement of drain. He first noted bile on his shirt and came for evaluation. He denied any abdominal pain but found to have pancreatitis with a lipase > 2000. He was started on antibiotics and completed a 7 day course. Imaging showed the malpositioned drain with holes outside of the abdomen so he was taken to IR to have the drain upsized. He continued to improve, able to tolerate a diet, ambulate, and didn't develop any abdominal pain.    Therefore, he is discharged in good and stable condition with close outpatient follow-up.    SPECIFIC OUTPATIENT FOLLOW-UP  Follow up appointment as noted below.  No pending studies at the time of discharge.    DISCHARGE PROBLEM LIST  Principal Problem:    Choledocholithiasis POA: Yes  Active Problems:    Diabetes mellitus (CMS-HCC) (Chronic) POA: Yes    DVT, recurrent, lower extremity, chronic (CMS-HCC) POA: Yes    Hypothyroidism (Chronic) POA: Yes  Resolved Problems:    Cholangitis POA: Yes    Hypokalemia POA: Yes      FOLLOW UP  Travis Solis M.D.  [id number]  [place] [id number]  [phone]      Office will contact patient to schedule an appointment.      MEDICATIONS ON DISCHARGE   Naveed Tamayo   Home Medication Instructions CHARLES:[id number]    Printed on:10/01/17 1030   Medication Information                      Cholecalciferol (VITAMIN D PO)  Take 1 Cap by mouth every day.             insulin detemir (LEVEMIR FLEXPEN) 100 UNIT/ML SOPN injection  Inject 8 Units as instructed every morning.             levothyroxine (SYNTHROID) 125 MCG TABS  Take 1 Tab by mouth Every morning on an empty stomach.                 DIET  Orders Placed This Encounter   Procedures   • DIET ORDER     Standing Status:   Standing     Number of Occurrences:   1     Order Specific  Question:   Diet:     Answer:   Diabetic [3]     Order Specific Question:   Texture/Fiber modifications:     Answer:   Dysphagia 2(Pureed/Chopped)specify fluid consistency(question 6) [2]       ACTIVITY  Light duty.  Weight bearing as tolerated      CONSULTATIONS  GI  Interventional Radiology    PROCEDURES  XR abdomen-  Right upper quadrant pigtail biliary drain. Some drainholes project at the very edge of the right flank, and may be external to the patient.    CT abdomen/pelvis-  1. New compression deformity and associated linear fracture line through the anterior T11 vertebral body. Fracture line does not appear to extend to the posterior cortex (see sagittal image #35 and coronal image #14). This is new from the comparison exam   performed 7 September 2017.  2. Residual pancreas demonstrates new dilation of the main pancreatic duct, up to 7 mm in caliber, although not well assessed given motion artifact and lack of intravenous contrast. Correlate clinically for evidence of main pancreatic duct obstruction   and/or evolving pancreatitis.  3. Interval placement of a right upper quadrant drain, with at least one drain hole external to the patient. Motion degrades images to the extent that the location of the tip of the drain is difficult to ascertain with certainty, but it appears to be in or   near the common bile duct, near the expected location of the pancreatic head.  4. Other chronic findings (including status post splenectomy, probably status post cholecystectomy, and status post prostatectomy; also, postsurgical change of the stomach and pancreas, as well as bilateral nonobstructing nephrolithiasis) similar to recent   prior exam.    XR abdomen-  1.  Malpositioned internal-external biliary drainage catheter with catheter side holes clearly outside the abdominal cavity. The pigtail loop of the catheter is  probably near the level of the choledochojejunostomy anastomosis.      LABORATORY  Lab Results    Component Value Date/Time    SODIUM 137 09/30/2017 01:30 AM    POTASSIUM 4.0 09/30/2017 01:30 AM    CHLORIDE 114 (H) 09/30/2017 01:30 AM    CO2 16 (L) 09/30/2017 01:30 AM    GLUCOSE 129 (H) 09/30/2017 01:30 AM    BUN 10 09/30/2017 01:30 AM    CREATININE 1.35 09/30/2017 01:30 AM        Lab Results   Component Value Date/Time    WBC 7.9 09/26/2017 04:30 AM    HEMOGLOBIN 9.6 (L) 09/26/2017 04:30 AM    HEMATOCRIT 30.0 (L) 09/26/2017 04:30 AM    PLATELETCT 293 09/26/2017 04:30 AM        Total time of the discharge process exceeds 31 minutes

## 2017-10-02 NOTE — DOCUMENTATION QUERY
DOCUMENTATION QUERY    PROVIDERS: Please select “Cosign w/ note”to reply to query.    To better represent the severity of illness of your patient, please review the following information and exercise your independent professional judgment in responding to this query.    There is conflicting documentation in the IR Procedure Report dated 9/12/17.  Mahsa balloon stone extraction from the common bile duct is documented under Technique/Exam and Findings, however, the body of the Procedure Report doesn't describe removal of stone.  Based upon the clinical findings, risk factors, and treatment, please specify if stone(s) were removed from the bile duct or not.    • Dilation of common bile duct without extraction of stone  • Dilation of common bile duct with extraction of stone  • Other explanation of clinical findings (please document)  • Unable to determine          The medical record reflects the following:   Clinical Findings  CBD stones   Treatment  cholangiogram, internal/external biliary drain, mahsa balloon dilation of CBD   Risk Factors  CBD stones w/acute cholangitis w/obstruction, e coli sepsis (severe), acute liver failure, acute respiratory failure, acute pulmonary edema   Location within medical record  IR Procedure Note dated 9/12/17     Thank you,   Kalyn Payton, [facility]   [email or web address]  [email or web address]

## 2017-10-10 ENCOUNTER — OFFICE VISIT (OUTPATIENT)
Dept: INTERNAL MEDICINE | Facility: MEDICAL CENTER | Age: 82
End: 2017-10-10
Payer: MEDICARE

## 2017-10-10 VITALS
HEIGHT: 72 IN | OXYGEN SATURATION: 97 % | SYSTOLIC BLOOD PRESSURE: 156 MMHG | HEART RATE: 64 BPM | DIASTOLIC BLOOD PRESSURE: 68 MMHG | TEMPERATURE: 97.9 F | BODY MASS INDEX: 19.91 KG/M2 | WEIGHT: 147 LBS

## 2017-10-10 DIAGNOSIS — K80.50 CHOLEDOCHOLITHIASIS: ICD-10-CM

## 2017-10-10 DIAGNOSIS — E03.9 HYPOTHYROIDISM, UNSPECIFIED TYPE: Chronic | ICD-10-CM

## 2017-10-10 DIAGNOSIS — Z79.4 TYPE 2 DIABETES MELLITUS WITHOUT COMPLICATION, WITH LONG-TERM CURRENT USE OF INSULIN (HCC): ICD-10-CM

## 2017-10-10 DIAGNOSIS — E11.9 TYPE 2 DIABETES MELLITUS WITHOUT COMPLICATION, WITH LONG-TERM CURRENT USE OF INSULIN (HCC): ICD-10-CM

## 2017-10-10 PROCEDURE — 99202 OFFICE O/P NEW SF 15 MIN: CPT | Mod: GE | Performed by: INTERNAL MEDICINE

## 2017-10-10 NOTE — PATIENT INSTRUCTIONS
Please call Dr. Myron Haines MD and make an appointment with him to follow up on your condition

## 2017-10-11 NOTE — PROGRESS NOTES
New Patient to Establish    Reason to establish: New patient to establish    CC: request to remove biliary drain.    HPI: 82 y/o male with past medical history of IDDM and hypothyroidism. Presents to the clinic to request removal of biliary drain. The patient has a history of hospital admission because of acute cholangitis on the 9/3/2017. He has a biliary drain placed as part of acute cholangitis management.   Patient denies fever, chills, nausea, vomiting, abdominal pain, or chest pain.    Patient Active Problem List    Diagnosis Date Noted   • Choledocholithiasis 09/07/2017     Priority: High   • Metabolic acidemia 09/07/2017     Priority: Medium   • Obstructive jaundice 09/07/2017     Priority: Medium   • DVT, recurrent, lower extremity, chronic (CMS-HCC) 09/07/2017     Priority: Medium   • Undiagnosed cardiac murmurs 12/15/2014     Priority: Medium   • Diabetes mellitus (CMS-HCC) 12/15/2014     Priority: Medium   • CKD (chronic kidney disease) 12/15/2014     Priority: Medium   • Nonspecific abnormal electrocardiogram (ECG) (EKG) 12/15/2014     Priority: Medium   • PVC (premature ventricular contraction) 12/15/2014     Priority: Medium   • Hypothyroidism 12/15/2014     Priority: Low   • Elevated lipase 09/25/2017   • DNR (do not resuscitate) 09/12/2017   • PUD (peptic ulcer disease) 09/07/2017   • 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     • insulin detemir (LEVEMIR FLEXPEN) 100 UNIT/ML SOPN injection Inject 8 Units as instructed every morning.     • levothyroxine (SYNTHROID) 125 MCG TABS Take 1 Tab by mouth Every morning on an empty stomach. 30  Tab 0     No current facility-administered medications for this visit.        Allergies as of 10/10/2017 - Reviewed 10/10/2017   Allergen Reaction Noted   • Talwin  12/11/2016       Social History     Social History   • Marital status:      Spouse name: N/A   • Number of children: N/A   • Years of education: N/A     Occupational History   • Not on file.     Social History Main Topics   • Smoking status: Never Smoker   • Smokeless tobacco: Never Used   • Alcohol use No   • Drug use: No   • Sexual activity: Not on file     Other Topics Concern   • Not on file     Social History Narrative   • No narrative on file       History reviewed. No pertinent family history.    Past Surgical History:   Procedure Laterality Date   • ABDOMINAL EXPLORATION     • CHOLECYSTECTOMY     • CHOLECYSTECTOMY     • CHOLECYSTECTOMY     • GASTRECTOMY     • HERNIA REPAIR      Abdominal   • PROSTATECTOMY, RADICAL RETRO     • THYROIDECTOMY         ROS: As per HPI. Additional pertinent symptoms as noted below.    All others negative    /68   Pulse 64   Temp 36.6 °C (97.9 °F)   Ht 1.829 m (6')   Wt 66.7 kg (147 lb)   SpO2 97%   BMI 19.94 kg/m²     Physical Exam  General:  Alert and oriented, No apparent distress.    Eyes: Pupils equal and reactive. No scleral icterus.    Throat: Clear no erythema or exudates noted.    Neck: Supple. No lymphadenopathy noted. Thyroid not enlarged.    Lungs: Clear to auscultation and percussion bilaterally.    Cardiovascular: Regular rate and rhythm. No murmurs, rubs or gallops.    Abdomen:  Benign. No rebound or guarding noted.    Extremities: No clubbing, cyanosis, edema.    Skin: Clear. No rash or suspicious skin lesions noted.    Other: biliary drain tube is noticed on the right side of the abdomen. No skin redness, tenderness, or bleeding.    Note: I have reviewed all pertinent labs and diagnostic tests associated with this visit with specific comments listed under the assessment and plan  below    Assessment and Plan    1. Hypothyroidism, unspecified type  - Stable on levothyroxine 125 mg.  - Denies S&S of hypothyroidism  Plan  - Continue on same medication.    2. Type 2 diabetes mellitus without complication, with long-term current use of insulin (CMS-[facility])  - Stable on insuline Detemir 8 units at the morning daily.  Plan  - Continue on insulin    3. Choledocholithiasis  - Hx of hospital admission because of acute cholangitis with choledocholithiasis.  - biliary drain as part of the management   - Pt thought that we can remove his biliary tube at the clinic.  - Pt has an appointment with GI specialist on the 12/15/2017.  Plan  - Call the interventional radiologist to confirm who will remove the patient biliary draining tube.  - Advice the patient to follow up with his PCP at [place].      Followup: Return if symptoms worsen or fail to improve.    Risk Assessment (discuss potential complications a function of chronic problems): Risk assessment has been discussed with the patient.    Complexity (discuss number of co-morbidities): Co-morbidities have been discussed with the patient.    Signed by: Ulises Ghosh M.D.

## 2019-06-20 ENCOUNTER — HOSPITAL ENCOUNTER (OUTPATIENT)
Dept: RADIOLOGY | Facility: MEDICAL CENTER | Age: 84
End: 2019-06-20
Attending: UROLOGY
Payer: MEDICARE

## 2019-06-20 DIAGNOSIS — C61 MALIGNANT NEOPLASM OF PROSTATE (HCC): ICD-10-CM

## 2019-06-20 PROCEDURE — A9588 FLUCICLOVINE F-18: HCPCS

## 2020-06-11 PROBLEM — E87.5 HYPERKALEMIA: Status: ACTIVE | Noted: 2020-06-11

## 2020-06-11 PROBLEM — N17.9 ACUTE RENAL FAILURE (HCC): Status: ACTIVE | Noted: 2020-06-11

## 2020-06-12 PROBLEM — E87.5 HYPERKALEMIA: Status: RESOLVED | Noted: 2020-06-11 | Resolved: 2020-06-12

## 2020-06-12 PROBLEM — N17.9 ACUTE RENAL FAILURE (HCC): Status: RESOLVED | Noted: 2020-06-11 | Resolved: 2020-06-12

## 2020-07-23 ENCOUNTER — HOSPITAL ENCOUNTER (OUTPATIENT)
Dept: RADIOLOGY | Facility: MEDICAL CENTER | Age: 85
End: 2020-07-23
Attending: UROLOGY
Payer: MEDICARE

## 2020-07-23 DIAGNOSIS — C61 MALIGNANT NEOPLASM OF PROSTATE (HCC): ICD-10-CM

## 2020-07-23 PROCEDURE — A9588 FLUCICLOVINE F-18: HCPCS

## 2021-10-28 ENCOUNTER — HOSPITAL ENCOUNTER (OUTPATIENT)
Dept: RADIOLOGY | Facility: MEDICAL CENTER | Age: 86
End: 2021-10-28
Attending: UROLOGY
Payer: MEDICARE

## 2021-10-28 DIAGNOSIS — C61 MALIGNANT NEOPLASM OF PROSTATE (HCC): ICD-10-CM

## 2021-10-28 PROCEDURE — A9588 FLUCICLOVINE F-18: HCPCS

## 2022-01-19 PROBLEM — E11.628 DIABETIC FOOT INFECTION (HCC): Status: ACTIVE | Noted: 2022-01-19

## 2022-01-19 PROBLEM — L08.9 DIABETIC FOOT INFECTION (HCC): Status: ACTIVE | Noted: 2022-01-19

## 2022-01-27 ENCOUNTER — HOSPITAL ENCOUNTER (INPATIENT)
Facility: REHABILITATION | Age: 87
End: 2022-01-27
Attending: PHYSICAL MEDICINE & REHABILITATION | Admitting: PHYSICAL MEDICINE & REHABILITATION
Payer: MEDICARE

## 2022-01-28 PROBLEM — E11.628 DIABETIC FOOT INFECTION (HCC): Status: RESOLVED | Noted: 2022-01-19 | Resolved: 2022-01-28

## 2022-01-28 PROBLEM — L08.9 DIABETIC FOOT INFECTION (HCC): Status: RESOLVED | Noted: 2022-01-19 | Resolved: 2022-01-28

## 2022-01-28 PROBLEM — R78.81 BACTEREMIA: Status: ACTIVE | Noted: 2022-01-28

## 2022-02-22 PROBLEM — R78.81 BACTEREMIA: Status: RESOLVED | Noted: 2022-01-28 | Resolved: 2022-02-22

## 2022-02-22 PROBLEM — S88.119A BELOW KNEE AMPUTATION (HCC): Status: ACTIVE | Noted: 2022-02-22

## 2022-02-28 ENCOUNTER — HOSPITAL ENCOUNTER (EMERGENCY)
Facility: MEDICAL CENTER | Age: 87
End: 2022-02-28
Attending: EMERGENCY MEDICINE
Payer: MEDICARE

## 2022-02-28 VITALS
RESPIRATION RATE: 16 BRPM | TEMPERATURE: 98.1 F | HEIGHT: 72 IN | OXYGEN SATURATION: 98 % | BODY MASS INDEX: 18.96 KG/M2 | SYSTOLIC BLOOD PRESSURE: 118 MMHG | HEART RATE: 90 BPM | WEIGHT: 140 LBS | DIASTOLIC BLOOD PRESSURE: 72 MMHG

## 2022-02-28 DIAGNOSIS — E16.2 HYPOGLYCEMIA: ICD-10-CM

## 2022-02-28 DIAGNOSIS — R41.82 ALTERED MENTAL STATUS, UNSPECIFIED ALTERED MENTAL STATUS TYPE: ICD-10-CM

## 2022-02-28 LAB
ALBUMIN SERPL BCP-MCNC: 3.2 G/DL (ref 3.2–4.9)
ALBUMIN/GLOB SERPL: 0.9 G/DL
ALP SERPL-CCNC: 89 U/L (ref 30–99)
ALT SERPL-CCNC: 5 U/L (ref 2–50)
ANION GAP SERPL CALC-SCNC: 13 MMOL/L (ref 7–16)
AST SERPL-CCNC: 19 U/L (ref 12–45)
BASOPHILS # BLD AUTO: 0 % (ref 0–1.8)
BASOPHILS # BLD: 0 K/UL (ref 0–0.12)
BILIRUB SERPL-MCNC: 0.2 MG/DL (ref 0.1–1.5)
BUN SERPL-MCNC: 54 MG/DL (ref 8–22)
CALCIUM SERPL-MCNC: 7.6 MG/DL (ref 8.4–10.2)
CHLORIDE SERPL-SCNC: 107 MMOL/L (ref 96–112)
CO2 SERPL-SCNC: 18 MMOL/L (ref 20–33)
CREAT SERPL-MCNC: 1.49 MG/DL (ref 0.5–1.4)
EOSINOPHIL # BLD AUTO: 0 K/UL (ref 0–0.51)
EOSINOPHIL NFR BLD: 0 % (ref 0–6.9)
ERYTHROCYTE [DISTWIDTH] IN BLOOD BY AUTOMATED COUNT: 54.7 FL (ref 35.9–50)
GLOBULIN SER CALC-MCNC: 3.7 G/DL (ref 1.9–3.5)
GLUCOSE BLD STRIP.AUTO-MCNC: 114 MG/DL (ref 65–99)
GLUCOSE BLD STRIP.AUTO-MCNC: 182 MG/DL (ref 65–99)
GLUCOSE BLD STRIP.AUTO-MCNC: 334 MG/DL (ref 65–99)
GLUCOSE BLD STRIP.AUTO-MCNC: 53 MG/DL (ref 65–99)
GLUCOSE BLD STRIP.AUTO-MCNC: 74 MG/DL (ref 65–99)
GLUCOSE SERPL-MCNC: 70 MG/DL (ref 65–99)
HCT VFR BLD AUTO: 34.7 % (ref 42–52)
HGB BLD-MCNC: 11.1 G/DL (ref 14–18)
IMM GRANULOCYTES # BLD AUTO: 0.03 K/UL (ref 0–0.11)
IMM GRANULOCYTES NFR BLD AUTO: 0.4 % (ref 0–0.9)
LYMPHOCYTES # BLD AUTO: 0.34 K/UL (ref 1–4.8)
LYMPHOCYTES NFR BLD: 4.7 % (ref 22–41)
MCH RBC QN AUTO: 32.4 PG (ref 27–33)
MCHC RBC AUTO-ENTMCNC: 32 G/DL (ref 33.7–35.3)
MCV RBC AUTO: 101.2 FL (ref 81.4–97.8)
MONOCYTES # BLD AUTO: 0.37 K/UL (ref 0–0.85)
MONOCYTES NFR BLD AUTO: 5.1 % (ref 0–13.4)
NEUTROPHILS # BLD AUTO: 6.54 K/UL (ref 1.82–7.42)
NEUTROPHILS NFR BLD: 89.8 % (ref 44–72)
NRBC # BLD AUTO: 0 K/UL
NRBC BLD-RTO: 0 /100 WBC
PLATELET # BLD AUTO: 252 K/UL (ref 164–446)
PMV BLD AUTO: 12.5 FL (ref 9–12.9)
POTASSIUM SERPL-SCNC: 4.3 MMOL/L (ref 3.6–5.5)
PROT SERPL-MCNC: 6.9 G/DL (ref 6–8.2)
RBC # BLD AUTO: 3.43 M/UL (ref 4.7–6.1)
SODIUM SERPL-SCNC: 138 MMOL/L (ref 135–145)
WBC # BLD AUTO: 7.3 K/UL (ref 4.8–10.8)

## 2022-02-28 PROCEDURE — 99284 EMERGENCY DEPT VISIT MOD MDM: CPT

## 2022-02-28 PROCEDURE — 700105 HCHG RX REV CODE 258: Performed by: EMERGENCY MEDICINE

## 2022-02-28 PROCEDURE — 80053 COMPREHEN METABOLIC PANEL: CPT

## 2022-02-28 PROCEDURE — 85025 COMPLETE CBC W/AUTO DIFF WBC: CPT

## 2022-02-28 PROCEDURE — 36415 COLL VENOUS BLD VENIPUNCTURE: CPT

## 2022-02-28 PROCEDURE — 82962 GLUCOSE BLOOD TEST: CPT

## 2022-02-28 RX ORDER — SODIUM CHLORIDE, SODIUM LACTATE, POTASSIUM CHLORIDE, CALCIUM CHLORIDE 600; 310; 30; 20 MG/100ML; MG/100ML; MG/100ML; MG/100ML
500 INJECTION, SOLUTION INTRAVENOUS ONCE
Status: COMPLETED | OUTPATIENT
Start: 2022-02-28 | End: 2022-02-28

## 2022-02-28 RX ORDER — INSULIN ASPART 100 [IU]/ML
7 INJECTION, SOLUTION INTRAVENOUS; SUBCUTANEOUS
Status: ON HOLD | COMMUNITY
End: 2022-03-02

## 2022-02-28 RX ADMIN — SODIUM CHLORIDE, POTASSIUM CHLORIDE, SODIUM LACTATE AND CALCIUM CHLORIDE 500 ML: 600; 310; 30; 20 INJECTION, SOLUTION INTRAVENOUS at 12:38

## 2022-02-28 ASSESSMENT — FIBROSIS 4 INDEX: FIB4 SCORE: 3.52

## 2022-02-28 NOTE — ED TRIAGE NOTES
Pt BIB [facility], pt alert and orientated to year, time and reoriented to situation    Chief Complaint   Patient presents with   • ALOC   • Hypoglycemia     [facility] responded and pt was un responsive, BS checked 46, EMS placed a 18G PIV in R foot and gave 150ml of D10 and pt now alert, BS upon arrival is 76       Pt oriented to room, call light within reach, gurney in lowest position

## 2022-02-28 NOTE — ED PROVIDER NOTES
ED Provider Note    CHIEF COMPLAINT  Chief Complaint   Patient presents with   • ALOC   • Hypoglycemia     [facility] responded and pt was un responsive, BS checked 46, EMS placed a 18G PIV in R foot and gave 150ml of D10 and pt now alert, BS upon arrival is 76       HPI  Naveed Tamayo is a 87 y.o. male who presents to the emergency department by ambulance from group home for altered mental status.  Patient was unresponsive, EMS states blood sugar was 46.  He was given 150 mL's of D10 and became almost immediately alert.  Repeat blood glucose was 76.  He is returned to reported baseline upon arrival.    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  He states that he spends most of his time in bed but can transfer with assistance.  He states he has history of insulin-dependent diabetes, administers his own insulin after that medications are drawn into a syringe by the home staff.  Patient believes he ate breakfast this morning, but is not certain.  Denies vomiting.  Denies chest pain, abdominal pain.  Denies recent illness.    REVIEW OF SYSTEMS  See HPI for further details. All other systems are negative.     PAST MEDICAL HISTORY   has a past medical history of Cancer (HCC), Diabetes, Disorder of thyroid, Fall, Heart murmur, History of peptic ulcer (1960), and Hypertension.    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    CURRENT MEDICATIONS  Home  Medications     Reviewed by Sandee Banks (Pharmacy Tech) on 02/28/22 at 1127  Med List Status: Complete   Medication Last Dose Status   famotidine (PEPCID) 20 MG Tab 2/28/2022 Active   ferrous sulfate 325 (65 Fe) MG tablet 2/28/2022 Active   insulin aspart (NOVOLOG FLEXPEN) 100 UNIT/ML injection PEN 2/28/2022 Active   insulin glargine (LANTUS SOLOSTAR) 100 UNIT/ML Solution Pen-injector injection 2/27/2022 Active   oxyCODONE immediate-release (ROXICODONE) 5 MG Tab 2/27/2022 Active   polyethylene glycol/lytes (MIRALAX) 17 g Pack 2/25/2022 Active   rivaroxaban (XARELTO) 15 MG Tab tablet 2/27/2022 Active                ALLERGIES  Allergies   Allergen Reactions   • Gustavo MELCHOR has allergie listed, pt is not sure what happens        PHYSICAL EXAM  VITAL SIGNS: /71   Pulse (!) 121   Temp 35.9 °C (96.7 °F) (Temporal)   Resp 20   Ht 1.829 m (6')   Wt 63.5 kg (140 lb)   SpO2 95%   BMI 18.99 kg/m²   Pulse ox interpretation: I interpret this pulse ox as normal.  Constitutional: Alert in no apparent distress.  HENT: Normocephalic, atraumatic, no cephalohematoma. Bilateral external ears normal, Nose normal.  No oral trauma.  Eyes: Pupils are equal and reactive, Conjunctiva normal.   Neck: Normal range of motion, Supple   Lymphatic: No lymphadenopathy noted.   Cardiovascular: Regular rate and rhythm, no murmurs. Distal pulses intact.    Thorax & Lungs: Normal breath sounds.  No wheezing/rales/ronchi. No increased work of breathing  Abdomen: Soft, non-distended, non-tender to palpation. No palpable or pulsatile masses. No peritoneal signs.  Skin: Warm, Dry, No erythema, No rash.   Musculoskeletal: Left BKA.  Stump incision is clean, dry and intact.  No erythema, swelling or dehiscence.  Other extremities are unremarkable.  Neurologic: Alert and orient x4.  Speech is clear.  Moves 4 extremity spontaneously.  Psychiatric: Flat affect otherwise Judgment normal, Mood normal.       DIAGNOSTIC STUDIES /  PROCEDURES    LABS  Results for orders placed or performed during the hospital encounter of 02/28/22   CBC WITH DIFFERENTIAL   Result Value Ref Range    WBC 7.3 4.8 - 10.8 K/uL    RBC 3.43 (L) 4.70 - 6.10 M/uL    Hemoglobin 11.1 (L) 14.0 - 18.0 g/dL    Hematocrit 34.7 (L) 42.0 - 52.0 %    .2 (H) 81.4 - 97.8 fL    MCH 32.4 27.0 - 33.0 pg    MCHC 32.0 (L) 33.7 - 35.3 g/dL    RDW 54.7 (H) 35.9 - 50.0 fL    Platelet Count 252 164 - 446 K/uL    MPV 12.5 9.0 - 12.9 fL    Neutrophils-Polys 89.80 (H) 44.00 - 72.00 %    Lymphocytes 4.70 (L) 22.00 - 41.00 %    Monocytes 5.10 0.00 - 13.40 %    Eosinophils 0.00 0.00 - 6.90 %    Basophils 0.00 0.00 - 1.80 %    Immature Granulocytes 0.40 0.00 - 0.90 %    Nucleated RBC 0.00 /100 WBC    Neutrophils (Absolute) 6.54 1.82 - 7.42 K/uL    Lymphs (Absolute) 0.34 (L) 1.00 - 4.80 K/uL    Monos (Absolute) 0.37 0.00 - 0.85 K/uL    Eos (Absolute) 0.00 0.00 - 0.51 K/uL    Baso (Absolute) 0.00 0.00 - 0.12 K/uL    Immature Granulocytes (abs) 0.03 0.00 - 0.11 K/uL    NRBC (Absolute) 0.00 K/uL   COMP METABOLIC PANEL   Result Value Ref Range    Sodium 138 135 - 145 mmol/L    Potassium 4.3 3.6 - 5.5 mmol/L    Chloride 107 96 - 112 mmol/L    Co2 18 (L) 20 - 33 mmol/L    Anion Gap 13.0 7.0 - 16.0    Glucose 70 65 - 99 mg/dL    Bun 54 (H) 8 - 22 mg/dL    Creatinine 1.49 (H) 0.50 - 1.40 mg/dL    Calcium 7.6 (L) 8.4 - 10.2 mg/dL    AST(SGOT) 19 12 - 45 U/L    ALT(SGPT) 5 2 - 50 U/L    Alkaline Phosphatase 89 30 - 99 U/L    Total Bilirubin 0.2 0.1 - 1.5 mg/dL    Albumin 3.2 3.2 - 4.9 g/dL    Total Protein 6.9 6.0 - 8.2 g/dL    Globulin 3.7 (H) 1.9 - 3.5 g/dL    A-G Ratio 0.9 g/dL   ESTIMATED GFR   Result Value Ref Range    GFR If  54 (A) >60 mL/min/1.73 m 2    GFR If Non African American 45 (A) >60 mL/min/1.73 m 2   POCT glucose device results   Result Value Ref Range    POC Glucose, Blood 74 65 - 99 mg/dL   POCT glucose device results   Result Value Ref Range    POC Glucose, Blood  53 (L) 65 - 99 mg/dL   POCT glucose device results   Result Value Ref Range    POC Glucose, Blood 114 (H) 65 - 99 mg/dL   POCT glucose device results   Result Value Ref Range    POC Glucose, Blood 182 (H) 65 - 99 mg/dL     COURSE & MEDICAL DECISION MAKING  Nursing notes and vital signs were reviewed. (See chart for details)  The patients records were reviewed, history was obtained from the patient;     Group home staff confirms that patient administers subcu insulin after it is drawn into a syringe by the staff.  He receives Lantus 4 units every evening and 7 units of Novolin 3 times daily.  He was given his Novolin this morning.  Unsure if patient ate breakfast.  Altered mental status was sometime thereafter.    ED evaluation for altered mental status was likely secondary to the hypoglycemia.  Altered mental status resolved and he returned to baseline with D10 prior to arrival.  He had one dipping blood glucose here in the emergency department, but then received oral diet.  No recurrent, continues to trend upward.  Clinically slightly dehydrated, chemistry suggest the same, he received 500 cc of LR as well.  Mild tachycardia is improved.  Blood pressure stable.  He is afebrile.  Abdominal exam is benign.  No clinical evidence for infectious etiology.    Patient is stable for discharge at this time, anticipatory guidance provided, continue home medications, diabetic diet, close follow-up is encouraged, and strict ED return instructions have been detailed. Patient is agreeable to the disposition and plan.    The total critical care time on this patient is 40 minutes, immediate and continuous hemodynamic monitoring and multiple bedside evaluations, resuscitating patient and assessing response to treatment, deciphering test results, and arranging for discharge after prolonged ED observation and repeated blood glucoses. This 40 minutes is exclusive of separately billable procedures.      FINAL IMPRESSION  (E16.2)  Hypoglycemia  (R41.82) Altered mental status, unspecified altered mental status type, RESOLVED      Electronically signed by: Gayathri Marcano D.O., 2/28/2022 12:54 PM      This dictation was created using voice recognition software. The accuracy of the dictation is limited to the abilities of the software. I expect there may be some errors of grammar and possibly content. The nursing notes were reviewed and certain aspects of this information were incorporated into this note.

## 2022-02-28 NOTE — ED NOTES
Granddaughter and son updated  [facility] called and notified of pending transfer [phone]

## 2022-02-28 NOTE — DISCHARGE INSTRUCTIONS
Follow-up with primary care this week for reevaluation, medication management.    Continue home medications as previously indicated.  Be sure to eat while receiving insulin.  Encourage diabetic diet.  Encourage oral fluid hydration.    Return to the emergency department for altered mental status, uncontrolled blood sugars, vomiting, fever or other new concerns.

## 2022-02-28 NOTE — ED NOTES
Med rec updated and complete, per MAR from [facility] ([phone])  Allergies reviewed, per pt and MAR  Called [facility] to verify pts insulin names and pt is taking XARELTO 15MG once a day.

## 2022-02-28 NOTE — DISCHARGE PLANNING
analisa received from [facility] requesting assist with transportation back to [facility]. Verified address is current on [employer].   [facility] completed and faxed with [employer]. RN describes pt as bed confined.  Call placed to [facility] for XFM- [phone]  Update to [facility].

## 2022-03-01 ENCOUNTER — HOSPITAL ENCOUNTER (OUTPATIENT)
Facility: MEDICAL CENTER | Age: 87
End: 2022-03-02
Attending: EMERGENCY MEDICINE | Admitting: HOSPITALIST
Payer: MEDICARE

## 2022-03-01 DIAGNOSIS — R74.8 ELEVATED LIPASE: ICD-10-CM

## 2022-03-01 DIAGNOSIS — Z66 DO NOT RESUSCITATE STATUS: ICD-10-CM

## 2022-03-01 DIAGNOSIS — S88.119A BELOW KNEE AMPUTATION (HCC): ICD-10-CM

## 2022-03-01 DIAGNOSIS — K27.9 PUD (PEPTIC ULCER DISEASE): ICD-10-CM

## 2022-03-01 DIAGNOSIS — E11.9 TYPE 2 DIABETES MELLITUS WITHOUT COMPLICATION, WITH LONG-TERM CURRENT USE OF INSULIN (HCC): Chronic | ICD-10-CM

## 2022-03-01 DIAGNOSIS — R41.82 ALTERED MENTAL STATUS, UNSPECIFIED ALTERED MENTAL STATUS TYPE: ICD-10-CM

## 2022-03-01 DIAGNOSIS — D68.32 HEMORRHAGIC DISORDER DUE TO EXTRINSIC CIRCULATING ANTICOAGULANTS (HCC): ICD-10-CM

## 2022-03-01 DIAGNOSIS — Z79.4 TYPE 2 DIABETES MELLITUS WITHOUT COMPLICATION, WITH LONG-TERM CURRENT USE OF INSULIN (HCC): Chronic | ICD-10-CM

## 2022-03-01 DIAGNOSIS — E16.2 HYPOGLYCEMIA: ICD-10-CM

## 2022-03-01 DIAGNOSIS — R01.1 UNDIAGNOSED CARDIAC MURMURS: Chronic | ICD-10-CM

## 2022-03-01 DIAGNOSIS — E11.649 HYPOGLYCEMIA ASSOCIATED WITH DIABETES (HCC): ICD-10-CM

## 2022-03-01 LAB
ALBUMIN SERPL BCP-MCNC: 2.6 G/DL (ref 3.2–4.9)
ALBUMIN/GLOB SERPL: 0.6 G/DL
ALP SERPL-CCNC: 91 U/L (ref 30–99)
ALT SERPL-CCNC: <5 U/L (ref 2–50)
ANION GAP SERPL CALC-SCNC: 14 MMOL/L (ref 7–16)
AST SERPL-CCNC: 21 U/L (ref 12–45)
BASOPHILS # BLD AUTO: 0.2 % (ref 0–1.8)
BASOPHILS # BLD: 0.02 K/UL (ref 0–0.12)
BILIRUB SERPL-MCNC: 0.3 MG/DL (ref 0.1–1.5)
BUN SERPL-MCNC: 51 MG/DL (ref 8–22)
CALCIUM SERPL-MCNC: 7.4 MG/DL (ref 8.4–10.2)
CHLORIDE SERPL-SCNC: 105 MMOL/L (ref 96–112)
CO2 SERPL-SCNC: 14 MMOL/L (ref 20–33)
CREAT SERPL-MCNC: 1.46 MG/DL (ref 0.5–1.4)
EOSINOPHIL # BLD AUTO: 0.03 K/UL (ref 0–0.51)
EOSINOPHIL NFR BLD: 0.3 % (ref 0–6.9)
ERYTHROCYTE [DISTWIDTH] IN BLOOD BY AUTOMATED COUNT: 56.1 FL (ref 35.9–50)
EST. AVERAGE GLUCOSE BLD GHB EST-MCNC: 157 MG/DL
GLOBULIN SER CALC-MCNC: 4.2 G/DL (ref 1.9–3.5)
GLUCOSE BLD STRIP.AUTO-MCNC: 314 MG/DL (ref 65–99)
GLUCOSE BLD STRIP.AUTO-MCNC: 316 MG/DL (ref 65–99)
GLUCOSE BLD STRIP.AUTO-MCNC: 98 MG/DL (ref 65–99)
GLUCOSE SERPL-MCNC: 84 MG/DL (ref 65–99)
HBA1C MFR BLD: 7.1 % (ref 4–5.6)
HCT VFR BLD AUTO: 36.4 % (ref 42–52)
HGB BLD-MCNC: 11.5 G/DL (ref 14–18)
IMM GRANULOCYTES # BLD AUTO: 0.06 K/UL (ref 0–0.11)
IMM GRANULOCYTES NFR BLD AUTO: 0.5 % (ref 0–0.9)
LYMPHOCYTES # BLD AUTO: 0.47 K/UL (ref 1–4.8)
LYMPHOCYTES NFR BLD: 4.1 % (ref 22–41)
MCH RBC QN AUTO: 32.3 PG (ref 27–33)
MCHC RBC AUTO-ENTMCNC: 31.6 G/DL (ref 33.7–35.3)
MCV RBC AUTO: 102.2 FL (ref 81.4–97.8)
MONOCYTES # BLD AUTO: 0.53 K/UL (ref 0–0.85)
MONOCYTES NFR BLD AUTO: 4.6 % (ref 0–13.4)
NEUTROPHILS # BLD AUTO: 10.4 K/UL (ref 1.82–7.42)
NEUTROPHILS NFR BLD: 90.3 % (ref 44–72)
NRBC # BLD AUTO: 0 K/UL
NRBC BLD-RTO: 0 /100 WBC
PLATELET # BLD AUTO: 235 K/UL (ref 164–446)
PMV BLD AUTO: 12.1 FL (ref 9–12.9)
POTASSIUM SERPL-SCNC: 4.5 MMOL/L (ref 3.6–5.5)
PROT SERPL-MCNC: 6.8 G/DL (ref 6–8.2)
RBC # BLD AUTO: 3.56 M/UL (ref 4.7–6.1)
SODIUM SERPL-SCNC: 133 MMOL/L (ref 135–145)
WBC # BLD AUTO: 11.5 K/UL (ref 4.8–10.8)

## 2022-03-01 PROCEDURE — 83036 HEMOGLOBIN GLYCOSYLATED A1C: CPT

## 2022-03-01 PROCEDURE — A9270 NON-COVERED ITEM OR SERVICE: HCPCS | Performed by: HOSPITALIST

## 2022-03-01 PROCEDURE — 99219 PR INITIAL OBSERVATION CARE,LEVL II: CPT | Performed by: HOSPITALIST

## 2022-03-01 PROCEDURE — 82962 GLUCOSE BLOOD TEST: CPT

## 2022-03-01 PROCEDURE — G0378 HOSPITAL OBSERVATION PER HR: HCPCS

## 2022-03-01 PROCEDURE — 96372 THER/PROPH/DIAG INJ SC/IM: CPT

## 2022-03-01 PROCEDURE — 36415 COLL VENOUS BLD VENIPUNCTURE: CPT

## 2022-03-01 PROCEDURE — 80053 COMPREHEN METABOLIC PANEL: CPT

## 2022-03-01 PROCEDURE — 85025 COMPLETE CBC W/AUTO DIFF WBC: CPT

## 2022-03-01 PROCEDURE — 700102 HCHG RX REV CODE 250 W/ 637 OVERRIDE(OP): Performed by: HOSPITALIST

## 2022-03-01 PROCEDURE — 99285 EMERGENCY DEPT VISIT HI MDM: CPT

## 2022-03-01 RX ORDER — OXYCODONE HYDROCHLORIDE 5 MG/1
5 TABLET ORAL EVERY 6 HOURS PRN
Status: DISCONTINUED | OUTPATIENT
Start: 2022-03-01 | End: 2022-03-02 | Stop reason: HOSPADM

## 2022-03-01 RX ORDER — POLYETHYLENE GLYCOL 3350 17 G/17G
1 POWDER, FOR SOLUTION ORAL
Status: DISCONTINUED | OUTPATIENT
Start: 2022-03-01 | End: 2022-03-02 | Stop reason: HOSPADM

## 2022-03-01 RX ORDER — INSULIN LISPRO 100 [IU]/ML
1-6 INJECTION, SOLUTION INTRAVENOUS; SUBCUTANEOUS
Status: DISCONTINUED | OUTPATIENT
Start: 2022-03-01 | End: 2022-03-02 | Stop reason: HOSPADM

## 2022-03-01 RX ORDER — LEVOTHYROXINE SODIUM 0.07 MG/1
150 TABLET ORAL
Status: DISCONTINUED | OUTPATIENT
Start: 2022-03-02 | End: 2022-03-02 | Stop reason: HOSPADM

## 2022-03-01 RX ORDER — BISACODYL 10 MG
10 SUPPOSITORY, RECTAL RECTAL
Status: DISCONTINUED | OUTPATIENT
Start: 2022-03-01 | End: 2022-03-02 | Stop reason: HOSPADM

## 2022-03-01 RX ORDER — DEXTROSE MONOHYDRATE 25 G/50ML
50 INJECTION, SOLUTION INTRAVENOUS
Status: DISCONTINUED | OUTPATIENT
Start: 2022-03-01 | End: 2022-03-02 | Stop reason: HOSPADM

## 2022-03-01 RX ORDER — FAMOTIDINE 20 MG/1
20 TABLET, FILM COATED ORAL DAILY
Status: DISCONTINUED | OUTPATIENT
Start: 2022-03-02 | End: 2022-03-02 | Stop reason: HOSPADM

## 2022-03-01 RX ORDER — LEVOTHYROXINE SODIUM 0.15 MG/1
150 TABLET ORAL
COMMUNITY

## 2022-03-01 RX ORDER — AMOXICILLIN 250 MG
2 CAPSULE ORAL 2 TIMES DAILY
Status: DISCONTINUED | OUTPATIENT
Start: 2022-03-01 | End: 2022-03-02 | Stop reason: HOSPADM

## 2022-03-01 RX ORDER — HEPARIN SODIUM 5000 [USP'U]/ML
5000 INJECTION, SOLUTION INTRAVENOUS; SUBCUTANEOUS EVERY 8 HOURS
Status: DISCONTINUED | OUTPATIENT
Start: 2022-03-02 | End: 2022-03-01

## 2022-03-01 RX ORDER — FERROUS SULFATE 325(65) MG
325 TABLET ORAL
Status: DISCONTINUED | OUTPATIENT
Start: 2022-03-02 | End: 2022-03-02 | Stop reason: HOSPADM

## 2022-03-01 RX ORDER — INSULIN LISPRO 100 [IU]/ML
0.2 INJECTION, SOLUTION INTRAVENOUS; SUBCUTANEOUS
Status: DISCONTINUED | OUTPATIENT
Start: 2022-03-01 | End: 2022-03-01

## 2022-03-01 RX ORDER — POLYETHYLENE GLYCOL 3350 17 G/17G
1 POWDER, FOR SOLUTION ORAL PRN
Status: DISCONTINUED | OUTPATIENT
Start: 2022-03-01 | End: 2022-03-01

## 2022-03-01 RX ADMIN — INSULIN LISPRO 4 UNITS: 100 INJECTION, SOLUTION INTRAVENOUS; SUBCUTANEOUS at 21:59

## 2022-03-01 RX ADMIN — RIVAROXABAN 15 MG: 15 TABLET, FILM COATED ORAL at 18:40

## 2022-03-01 ASSESSMENT — CHA2DS2 SCORE
CHA2DS2 VASC SCORE: 3
AGE 75 OR GREATER: YES
SEX: MALE
VASCULAR DISEASE: NO
CHF OR LEFT VENTRICULAR DYSFUNCTION: NO
PRIOR STROKE OR TIA OR THROMBOEMBOLISM: NO
HYPERTENSION: NO
AGE 65 TO 74: NO
DIABETES: YES

## 2022-03-01 ASSESSMENT — LIFESTYLE VARIABLES
TOTAL SCORE: 0
EVER HAD A DRINK FIRST THING IN THE MORNING TO STEADY YOUR NERVES TO GET RID OF A HANGOVER: NO
ON A TYPICAL DAY WHEN YOU DRINK ALCOHOL HOW MANY DRINKS DO YOU HAVE: 0
CONSUMPTION TOTAL: NEGATIVE
ALCOHOL_USE: NO
AVERAGE NUMBER OF DAYS PER WEEK YOU HAVE A DRINK CONTAINING ALCOHOL: 0
HOW MANY TIMES IN THE PAST YEAR HAVE YOU HAD 5 OR MORE DRINKS IN A DAY: 0
TOTAL SCORE: 0
HAVE PEOPLE ANNOYED YOU BY CRITICIZING YOUR DRINKING: NO
HAVE YOU EVER FELT YOU SHOULD CUT DOWN ON YOUR DRINKING: NO
EVER FELT BAD OR GUILTY ABOUT YOUR DRINKING: NO
TOTAL SCORE: 0

## 2022-03-01 ASSESSMENT — COGNITIVE AND FUNCTIONAL STATUS - GENERAL
PERSONAL GROOMING: A LITTLE
DAILY ACTIVITIY SCORE: 17
WALKING IN HOSPITAL ROOM: TOTAL
TURNING FROM BACK TO SIDE WHILE IN FLAT BAD: A LITTLE
DRESSING REGULAR LOWER BODY CLOTHING: A LOT
SUGGESTED CMS G CODE MODIFIER DAILY ACTIVITY: CK
CLIMB 3 TO 5 STEPS WITH RAILING: TOTAL
MOVING FROM LYING ON BACK TO SITTING ON SIDE OF FLAT BED: A LOT
STANDING UP FROM CHAIR USING ARMS: A LOT
MOBILITY SCORE: 13
SUGGESTED CMS G CODE MODIFIER MOBILITY: CL
TOILETING: A LITTLE
DRESSING REGULAR UPPER BODY CLOTHING: A LITTLE
HELP NEEDED FOR BATHING: A LOT

## 2022-03-01 ASSESSMENT — PATIENT HEALTH QUESTIONNAIRE - PHQ9
SUM OF ALL RESPONSES TO PHQ9 QUESTIONS 1 AND 2: 0
SUM OF ALL RESPONSES TO PHQ9 QUESTIONS 1 AND 2: 0
1. LITTLE INTEREST OR PLEASURE IN DOING THINGS: NOT AT ALL
2. FEELING DOWN, DEPRESSED, IRRITABLE, OR HOPELESS: NOT AT ALL
1. LITTLE INTEREST OR PLEASURE IN DOING THINGS: NOT AT ALL
2. FEELING DOWN, DEPRESSED, IRRITABLE, OR HOPELESS: NOT AT ALL

## 2022-03-01 ASSESSMENT — FIBROSIS 4 INDEX
FIB4 SCORE: 3.66
FIB4 SCORE: 2.93

## 2022-03-01 ASSESSMENT — PAIN DESCRIPTION - PAIN TYPE: TYPE: ACUTE PAIN

## 2022-03-01 NOTE — ED PROVIDER NOTES
"ED Provider Note    ER PROVIDER NOTE          CHIEF COMPLAINT  Chief Complaint   Patient presents with   • Other       HPI  Naveed Tamayo is a 87 y.o. male who presents to the emergency department after an episode of unresponsiveness.  EMS was called to the patient's group home, where he was found minimally responsive.  He was given a bag of D10, and became much more responsive and alert.  His initial blood sugar was 45, second blood sugar was 198.  Patient does not remember the event although he was just here yesterday for similar.  He reports that he takes 7 units of his \"short acting insulin\" 3 times a day before each meal and then 4 units of his \"long-acting insulin\" at night before bed.  He has been taking these as directed, the group home where he is draws up the meds for him but he injects himself.  He states [facility] and thinks that his medications were changed at that point because he used to only take his short acting insulin twice per day.  He has no other complaints, no recent illness fevers chills cough or congestion.  No nausea or vomiting or diarrhea.  He reports he has been eating \"what they give him at the home\"    REVIEW OF SYSTEMS  Pertinent positives include altered mental status, hypoglycemia. Pertinent negatives include no vomiting or fever. See HPI for details. All other systems reviewed and are negative.    PAST MEDICAL HISTORY   has a past medical history of Cancer (HCC), Diabetes, Disorder of thyroid, Fall, Heart murmur, History of peptic ulcer (1960), and Hypertension.    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    FAMILY HISTORY  History reviewed. No pertinent family " history.    SOCIAL HISTORY  Social History     Socioeconomic History   • Marital status:    Tobacco Use   • Smoking status: Never Smoker   • Smokeless tobacco: Never Used   Vaping Use   • Vaping Use: Never used   Substance and Sexual Activity   • Alcohol use: No   • Drug use: No      Social History     Substance and Sexual Activity   Drug Use No       CURRENT MEDICATIONS  Home Medications     Reviewed by Sandee Banks (Pharmacy Tech) on 03/01/22 at 1204  Med List Status: Complete   Medication Last Dose Status   famotidine (PEPCID) 20 MG Tab 3/1/2022 Active   ferrous sulfate 325 (65 Fe) MG tablet 3/1/2022 Active   insulin aspart (NOVOLOG FLEXPEN) 100 UNIT/ML injection PEN 3/1/2022 Active   insulin glargine (LANTUS SOLOSTAR) 100 UNIT/ML Solution Pen-injector injection 2/28/2022 Active   levothyroxine (SYNTHROID) 150 MCG Tab 3/1/2022 Active   oxyCODONE immediate-release (ROXICODONE) 5 MG Tab 3/1/2022 Active   polyethylene glycol/lytes (MIRALAX) 17 g Pack 2/25/2022 Active   rivaroxaban (XARELTO) 15 MG Tab tablet 2/28/2022 Active                ALLERGIES  Allergies   Allergen Reactions   • Gustavo MELCHOR has allergie listed, pt is not sure what happens        PHYSICAL EXAM  VITAL SIGNS: /66   Pulse 99   Temp 36.2 °C (97.2 °F) (Temporal)   Resp 18   Wt 63.5 kg (140 lb)   SpO2 96%   BMI 18.99 kg/m²   Pulse ox interpretation: I interpret this pulse ox as normal.    Constitutional: Mildly sleepy but easily arousable, no apparent distress.  HENT: No signs of trauma, Bilateral external ears normal, Nose normal.   Eyes: Pupils are equal and reactive, Conjunctiva normal, Non-icteric.   Neck: Normal range of motion, No tenderness, Supple, No stridor.      Cardiovascular: Regular rate and rhythm, no murmurs.   Thorax & Lungs: Normal breath sounds, No respiratory distress, No wheezing, No chest tenderness.   Abdomen: Bowel sounds normal, Soft, No tenderness, No masses, No pulsatile masses. No  peritoneal signs.  Skin: Warm, Dry, No erythema, No rash.   Back: No bony tenderness, No CVA tenderness.   Extremities: AKA on the left, otherwise intact distal pulses, No edema, No tenderness, No cyanosis,.  Musculoskeletal: AKA on the left, otherwise good range of motion in all major joints. No tenderness to palpation or major deformities noted.   Neurologic: Mildly sleepy but easily arousable normal motor function, Normal sensory function, No focal deficits noted.   Psychiatric: Affect normal, Judgment normal, Mood normal.     DIAGNOSTIC STUDIES / PROCEDURES    Labs Reviewed   CBC WITH DIFFERENTIAL - Abnormal; Notable for the following components:       Result Value    WBC 11.5 (*)     RBC 3.56 (*)     Hemoglobin 11.5 (*)     Hematocrit 36.4 (*)     .2 (*)     MCHC 31.6 (*)     RDW 56.1 (*)     Neutrophils-Polys 90.30 (*)     Lymphocytes 4.10 (*)     Neutrophils (Absolute) 10.40 (*)     Lymphs (Absolute) 0.47 (*)     All other components within normal limits   COMP METABOLIC PANEL - Abnormal; Notable for the following components:    Sodium 133 (*)     Co2 14 (*)     Bun 51 (*)     Creatinine 1.46 (*)     Calcium 7.4 (*)     Albumin 2.6 (*)     Globulin 4.2 (*)     All other components within normal limits   ESTIMATED GFR - Abnormal; Notable for the following components:    GFR If  55 (*)     GFR If Non  46 (*)     All other components within normal limits   HEMOGLOBIN A1C    Narrative:     May draw in AM if samples insufficient.  No need to redraw  immediately.   POCT GLUCOSE DEVICE RESULTS           RADIOLOGY  No orders to display     The radiologist's interpretation of all radiological studies have been reviewed and images independently viewed by me.    COURSE & MEDICAL DECISION MAKING  Nursing notes, VS, PMSFHx reviewed in chart.    12:25 PM Patient seen and examined at bedside.  Ordered for CBC and CMP, will check his blood sugar again in an hour, currently 98      300  PM  Patient is reevaluated, he is more awake and alert, and has eaten here.    Discussed the case with hospitalist for admission        Decision Making:  This is a 87 y.o. male presented with altered mental status.  This appears to be related to his hypoglycemia as he did improved after D10 administration and eating.  However patient presented yesterday for the same.  I am concerned that he is on incorrect dosing of insulin which could have significant harm should he continue this.  As such, patient will be admitted for monitoring of his mental status as well as his blood sugar and likely changes to his insulin regimen.  He does not have any other findings of significant metabolic disturbance at this time.  No infectious symptoms or findings on exam either    Pt is admitted in guarded condition    FINAL IMPRESSION  1. Hypoglycemia    2. Altered mental status, unspecified altered mental status type         The note accurately reflects work and decisions made by me.  Hair Mortensen M.D.  3/1/2022  4:05 PM

## 2022-03-01 NOTE — ED NOTES
Med rec updated and complete, per MAR from [facility] ([phone])  Allergies reviewed, per MAR  Med rec was updated on 2/28/2022

## 2022-03-01 NOTE — ED TRIAGE NOTES
Pt BIB [facility] with c/o low blood sugar. When [facility] arrived his BS was 45. Pt was given a bag of D10 and BS came up to 198. Per [facility] before pt was found he was given his insulin, ativan and narcotic meds. Pt is sleeping but easily arousable.

## 2022-03-01 NOTE — ED NOTES
Pt discharged to [facility] for transfer back to [facility], [facility] called and notified of transfer

## 2022-03-01 NOTE — H&P
Hospital Medicine History & Physical Note    Date of Service  3/1/2022    Primary Care Physician  Myron Haines M.D.    Consultants  None at this time    Code Status  DNAR/DNI    Chief Complaint  Chief Complaint   Patient presents with   • Other       History of Presenting Illness  Naveed Tamayo is a 87 y.o. male who presented 3/1/2022 with recurrent hypoglycemia.  He was actually in our emergency department yesterday for hypoglycemia.  Home medications may have been adjusted by ER MD and patient was returned to his group home at that time.  Unfortunately, he had a recurrent episode of hypoglycemia again today with altered mental status that resolved with pushes of sugars by paramedics.  I am asked to observe patient overnight so that his medications may be straightened out.      Patient has been taking 7 units postprandially, 8 units of basal, and a sliding scale on top of that.  This was apparently adjusted to 4 units of basal with no other changes recently.    Acuity is subacute, severity is mild, exacerbated by adherence to insulin regimen, alleviated with glucose, timing is post insulin injections, priors are per above, course is recurrent.    I discussed the plan of care with patient.    Review of Systems  All systems reviewed and negative except as noted per above.    Past Medical History   has a past medical history of Cancer (HCC), Diabetes, Disorder of thyroid, Fall, Heart murmur, History of peptic ulcer (1960), and Hypertension.    Surgical History   has a past surgical history that includes thyroidectomy; prostatectomy, radical retro; abdominal exploration; cholecystectomy; gastrectomy; cholecystectomy; hernia repair; cholecystectomy; other; irrigation & debridement general (Left, 1/20/2022); and knee amputation below (Left, 1/23/2022).     Family History  Family history is reviewed and negative.    Social History   reports that he has never smoked. He has never used smokeless tobacco. He  reports that he does not drink alcohol and does not use drugs.    Allergies  Allergies   Allergen Reactions   • Gustavo      Per MAR has allergie listed, pt is not sure what happens        Medications  Prior to Admission Medications   Prescriptions Last Dose Informant Patient Reported? Taking?   famotidine (PEPCID) 20 MG Tab 3/1/2022 at 0800 MAR from Other Facility No No   Sig: Take 1 Tablet by mouth every day.   ferrous sulfate 325 (65 Fe) MG tablet 3/1/2022 at 0800 MAR from Other Facility No No   Sig: Take 1 Tablet by mouth every morning with breakfast.   insulin aspart (NOVOLOG FLEXPEN) 100 UNIT/ML injection PEN 3/1/2022 at 0730 MAR from Other Facility Yes No   Sig: Inject 7 Units under the skin 3 times a day before meals. Pt only takes 7 unit 3 times a day, no sliding scale or carb counting   insulin glargine (LANTUS SOLOSTAR) 100 UNIT/ML Solution Pen-injector injection 2/28/2022 at 2000 MAR from Other Facility No No   Sig: Inject 8 Units as instructed every evening.   Patient taking differently: Inject 4 Units under the skin every evening.   levothyroxine (SYNTHROID) 150 MCG Tab 3/1/2022 at 0730 MAR from Other Facility Yes Yes   Sig: Take 150 mcg by mouth every morning on an empty stomach.   oxyCODONE immediate-release (ROXICODONE) 5 MG Tab 3/1/2022 at 0800 MAR from Other Facility No No   Sig: Take 1 Tablet by mouth every 6 hours as needed (phantom pain) for up to 10 days.   polyethylene glycol/lytes (MIRALAX) 17 g Pack 2/25/2022 at Unknown MAR from Other Facility No No   Sig: Take 1 Packet by mouth every day.   Patient taking differently: Take 17 g by mouth as needed. Indications: Constipation   rivaroxaban (XARELTO) 15 MG Tab tablet 2/28/2022 at 1700 MAR from Other Facility Yes No   Sig: Take 15 mg by mouth with dinner. Per MAR has pt on 15MG every evening      Facility-Administered Medications: None       Physical Exam  Temp:  [36.2 °C (97.2 °F)-36.7 °C (98.1 °F)] 36.2 °C (97.2 °F)  Pulse:  [] 99  Resp:   [16-18] 18  BP: (109-118)/(62-72) 109/66  SpO2:  [95 %-98 %] 96 %  Blood Pressure : 109/66   Temperature: 36.2 °C (97.2 °F)   Pulse: 99   Respiration: 18   Pulse Oximetry: 96 %     General appearance: NAD, conversant  Neck: FROM, supple  Lungs: Clear to auscultation  CV: RRR, no MRGs; normal carotid upstroke and amplitude without bruits  Abdomen: Soft, non-tender; no masses or HSM  Extremities: No peripheral edema or digital cyanosis  Skin: no rash, lesions or ulcers  Psych: Alert and oriented to person, place and time      Laboratory:  Recent Labs     02/28/22  1128 03/01/22  1341   WBC 7.3 11.5*   RBC 3.43* 3.56*   HEMOGLOBIN 11.1* 11.5*   HEMATOCRIT 34.7* 36.4*   .2* 102.2*   MCH 32.4 32.3   MCHC 32.0* 31.6*   RDW 54.7* 56.1*   PLATELETCT 252 235   MPV 12.5 12.1     Recent Labs     02/28/22  1128 03/01/22  1341   SODIUM 138 133*   POTASSIUM 4.3 4.5   CHLORIDE 107 105   CO2 18* 14*   GLUCOSE 70 84   BUN 54* 51*   CREATININE 1.49* 1.46*   CALCIUM 7.6* 7.4*     Recent Labs     02/28/22  1128 03/01/22  1341   ALTSGPT 5 <5   ASTSGOT 19 21   ALKPHOSPHAT 89 91   TBILIRUBIN 0.2 0.3   GLUCOSE 70 84         No results for input(s): NTPROBNP in the last 72 hours.      No results for input(s): TROPONINT in the last 72 hours.    Imaging:  No orders to display       No EKG available for interpretation.    Assessment/Plan:  I anticipate this patient is appropriate for observation status at this time.    * Hypoglycemia associated with diabetes (HCC)- (present on admission)  Assessment & Plan  At this juncture I feel it would be prudent to discontinue his basal and prandial insulins, will monitor with a sliding scale regimen overnight.  If this is unremarkable anticipate he can return to his group home in the morning with these changes in place.  I will have fairly liberal glucose targets in this 87-year-old gentleman.    Below knee amputation (HCC)- (present on admission)  Assessment & Plan  Patient underwent amputation  in January of this year secondary to a diabetic foot wound, since then he has been managed with rather strict glucose control, acute wounds have healed, given his recurrent hypoglycemic episodes I feel it is reasonable to loosen glucose control at this juncture.    DVT, recurrent, lower extremity, chronic  Assessment & Plan  Continue home dose Xarelto.    CKD (chronic kidney disease)- (present on admission)  Assessment & Plan  Avoid nephrotoxins.  Routine monitoring.      VTE prophylaxis: heparin ppx

## 2022-03-02 ENCOUNTER — PATIENT OUTREACH (OUTPATIENT)
Dept: HEALTH INFORMATION MANAGEMENT | Facility: OTHER | Age: 87
End: 2022-03-02
Payer: MEDICARE

## 2022-03-02 VITALS
OXYGEN SATURATION: 97 % | DIASTOLIC BLOOD PRESSURE: 59 MMHG | TEMPERATURE: 97.6 F | HEIGHT: 72 IN | BODY MASS INDEX: 18.96 KG/M2 | SYSTOLIC BLOOD PRESSURE: 100 MMHG | WEIGHT: 139.99 LBS | RESPIRATION RATE: 18 BRPM | HEART RATE: 89 BPM

## 2022-03-02 LAB
ANION GAP SERPL CALC-SCNC: 13 MMOL/L (ref 7–16)
BUN SERPL-MCNC: 47 MG/DL (ref 8–22)
CALCIUM SERPL-MCNC: 7.3 MG/DL (ref 8.4–10.2)
CHLORIDE SERPL-SCNC: 107 MMOL/L (ref 96–112)
CO2 SERPL-SCNC: 15 MMOL/L (ref 20–33)
CREAT SERPL-MCNC: 1.48 MG/DL (ref 0.5–1.4)
ERYTHROCYTE [DISTWIDTH] IN BLOOD BY AUTOMATED COUNT: 56.8 FL (ref 35.9–50)
GLUCOSE BLD STRIP.AUTO-MCNC: 108 MG/DL (ref 65–99)
GLUCOSE BLD STRIP.AUTO-MCNC: 113 MG/DL (ref 65–99)
GLUCOSE BLD STRIP.AUTO-MCNC: 168 MG/DL (ref 65–99)
GLUCOSE BLD STRIP.AUTO-MCNC: 201 MG/DL (ref 65–99)
GLUCOSE SERPL-MCNC: 115 MG/DL (ref 65–99)
HCT VFR BLD AUTO: 30.7 % (ref 42–52)
HGB BLD-MCNC: 9.6 G/DL (ref 14–18)
MCH RBC QN AUTO: 32.4 PG (ref 27–33)
MCHC RBC AUTO-ENTMCNC: 31.3 G/DL (ref 33.7–35.3)
MCV RBC AUTO: 103.7 FL (ref 81.4–97.8)
PLATELET # BLD AUTO: 221 K/UL (ref 164–446)
PMV BLD AUTO: 12.7 FL (ref 9–12.9)
POTASSIUM SERPL-SCNC: 4.8 MMOL/L (ref 3.6–5.5)
RBC # BLD AUTO: 2.96 M/UL (ref 4.7–6.1)
SODIUM SERPL-SCNC: 135 MMOL/L (ref 135–145)
WBC # BLD AUTO: 8.5 K/UL (ref 4.8–10.8)

## 2022-03-02 PROCEDURE — 85027 COMPLETE CBC AUTOMATED: CPT

## 2022-03-02 PROCEDURE — A9270 NON-COVERED ITEM OR SERVICE: HCPCS | Performed by: INTERNAL MEDICINE

## 2022-03-02 PROCEDURE — 700102 HCHG RX REV CODE 250 W/ 637 OVERRIDE(OP): Performed by: HOSPITALIST

## 2022-03-02 PROCEDURE — 80048 BASIC METABOLIC PNL TOTAL CA: CPT

## 2022-03-02 PROCEDURE — 99217 PR OBSERVATION CARE DISCHARGE: CPT | Performed by: INTERNAL MEDICINE

## 2022-03-02 PROCEDURE — A9270 NON-COVERED ITEM OR SERVICE: HCPCS | Performed by: HOSPITALIST

## 2022-03-02 PROCEDURE — 36415 COLL VENOUS BLD VENIPUNCTURE: CPT

## 2022-03-02 PROCEDURE — G0378 HOSPITAL OBSERVATION PER HR: HCPCS

## 2022-03-02 PROCEDURE — 82962 GLUCOSE BLOOD TEST: CPT

## 2022-03-02 PROCEDURE — 700102 HCHG RX REV CODE 250 W/ 637 OVERRIDE(OP): Performed by: INTERNAL MEDICINE

## 2022-03-02 RX ADMIN — LEVOTHYROXINE SODIUM 150 MCG: 0.07 TABLET ORAL at 05:44

## 2022-03-02 RX ADMIN — SENNOSIDES AND DOCUSATE SODIUM 2 TABLET: 50; 8.6 TABLET ORAL at 05:44

## 2022-03-02 RX ADMIN — FERROUS SULFATE TAB 325 MG (65 MG ELEMENTAL FE) 325 MG: 325 (65 FE) TAB at 08:36

## 2022-03-02 RX ADMIN — POLYETHYLENE GLYCOL 3350 1 PACKET: 17 POWDER, FOR SOLUTION ORAL at 05:45

## 2022-03-02 RX ADMIN — METFORMIN HYDROCHLORIDE 1000 MG: 500 TABLET ORAL at 08:36

## 2022-03-02 RX ADMIN — FAMOTIDINE 20 MG: 20 TABLET, FILM COATED ORAL at 05:44

## 2022-03-02 NOTE — CARE PLAN
The patient is Stable - Low risk of patient condition declining or worsening         Progress made toward(s) clinical / shift goals:  yes        Problem: Knowledge Deficit - Standard  Goal: Patient and family/care givers will demonstrate understanding of plan of care, disease process/condition, diagnostic tests and medications  Outcome: Progressing     Problem: Diabetes Management  Goal: Patient will achieve and maintain glucose in satisfactory range  Outcome: Progressing     Problem: Knowledge Deficit - Diabetes  Goal: Patient will demonstrate knowledge of insulin injection, symptoms, and treatment of hypoglycemia and diet prior to discharge  Outcome: Progressing     Problem: Discharge Planning - Diabetes  Goal: Patient's continuum of care needs will be met  Outcome: Progressing     Problem: Skin Integrity - Diabetes  Goal: Patient's skin on legs and feet will remain intact while hospitalized  Outcome: Progressing     Problem: Infection - Diabetes  Goal: Patient will remain free from signs and symptoms of infection  Outcome: Progressing  Goal: Promotion wound healing, line and drain management  Outcome: Progressing     Problem: Respiratory  Goal: Patient will achieve/maintain optimum respiratory ventilation and gas exchange  Outcome: Progressing     Problem: Fluid Balance or Risk for Fluid Volume Deficit  Goal: Patient will demonstrate adequate hydration and vital signs  Outcome: Progressing     Problem: Nutrition Deficit - Diabetes  Goal: Patient will demonstrate adequate hydration and vital signs  Outcome: Progressing     Problem: Diabetic Ulcer  Goal: Early identification of diabetic foot wound and initiation of appropriate interventions  Outcome: Progressing

## 2022-03-02 NOTE — ASSESSMENT & PLAN NOTE
Patient underwent amputation in January of this year secondary to a diabetic foot wound, since then he has been managed with rather strict glucose control, acute wounds have healed, given his recurrent hypoglycemic episodes I feel it is reasonable to loosen glucose control at this juncture.

## 2022-03-02 NOTE — DISCHARGE SUMMARY
"Discharge Summary    CHIEF COMPLAINT ON ADMISSION  Chief Complaint   Patient presents with   • Other       Reason for Admission  EMS     Admission Date  3/1/2022    CODE STATUS  DNAR/DNI    HPI & HOSPITAL COURSE  Per notes, \"87 y.o. male who presented 3/1/2022 with recurrent hypoglycemia.  He was actually in our emergency department yesterday for hypoglycemia.  Home medications may have been adjusted by ER MD and patient was returned to his group home at that time.  Unfortunately, he had a recurrent episode of hypoglycemia again today with altered mental status that resolved with pushes of sugars by paramedics.  I am asked to observe patient overnight so that his medications may be straightened out.       Patient has been taking 7 units postprandially, 8 units of basal, and a sliding scale on top of that.  This was apparently adjusted to 4 units of basal with no other changes recently.     Acuity is subacute, severity is mild, exacerbated by adherence to insulin regimen, alleviated with glucose, timing is post insulin injections, priors are per above, course is recurrent.\"    Patient was admitted and monitored overnight for hypoglycemia. Patient had been placed on insulin and insulin sliding scale during previous hospitalizations. A1c here was 7.1%. Given patient's age, current A1c is acceptable. He has had multiple episodes of hypoglycemia. At this point I do not think it necessary to treat diabetes. I explained to patient that even if he has slightly elevated blood sugar levels, this is safer than hypoglycemia. Initially was thought that he may need to start Metformin, however he does have stage III CKD, so we will withhold this. I have referred patient on to establish care with PCP where his blood sugar can be further monitored. For now, will discontinue all diabetes medications and insulin.     Therefore, he is discharged in good and stable condition to home with organized home healthcare and close outpatient " follow-up.    The patient recovered much more quickly than anticipated on admission.    Discharge Date  03/02/2022    FOLLOW UP ITEMS POST DISCHARGE  FU with PCP to establish care and for ongoing medical management.     DISCHARGE DIAGNOSES  Principal Problem:    Hypoglycemia associated with diabetes (HCC) POA: Yes  Active Problems:    CKD (chronic kidney disease) (Chronic) POA: Yes    Below knee amputation (HCC) POA: Yes  Resolved Problems:    * No resolved hospital problems. *      FOLLOW UP  No future appointments.  No follow-up provider specified.    MEDICATIONS ON DISCHARGE     Medication List      CHANGE how you take these medications      Instructions   polyethylene glycol/lytes 17 g Pack  What changed:   · when to take this  · reasons to take this  Commonly known as: MIRALAX   Take 1 Packet by mouth every day.  Dose: 17 g        CONTINUE taking these medications      Instructions   famotidine 20 MG Tabs  Commonly known as: PEPCID   Take 1 Tablet by mouth every day.  Dose: 20 mg     ferrous sulfate 325 (65 Fe) MG tablet   Take 1 Tablet by mouth every morning with breakfast.  Dose: 325 mg     levothyroxine 150 MCG Tabs  Commonly known as: SYNTHROID   Take 150 mcg by mouth every morning on an empty stomach.  Dose: 150 mcg     oxyCODONE immediate-release 5 MG Tabs  Commonly known as: ROXICODONE   Take 1 Tablet by mouth every 6 hours as needed (phantom pain) for up to 10 days.  Dose: 5 mg     rivaroxaban 15 MG Tabs tablet  Commonly known as: XARELTO   Take 15 mg by mouth with dinner. Simon MELCHOR has pt on 15MG every evening  Dose: 15 mg        STOP taking these medications    Lantus SoloStar 100 UNIT/ML Sopn injection  Generic drug: insulin glargine     NovoLOG FlexPen 100 UNIT/ML injection PEN  Generic drug: insulin aspart            Allergies  Allergies   Allergen Reactions   • Gustavo MELCHOR has belinda listed, pt is not sure what happens        DIET  Orders Placed This Encounter   Procedures   • Diet Order  Diet: Consistent CHO (Diabetic)     Standing Status:   Standing     Number of Occurrences:   1     Order Specific Question:   Diet:     Answer:   Consistent CHO (Diabetic) [4]       ACTIVITY  As tolerated.  Weight bearing as tolerated    CONSULTATIONS  NOne    PROCEDURES  None    LABORATORY  Lab Results   Component Value Date    SODIUM 135 03/02/2022    POTASSIUM 4.8 03/02/2022    CHLORIDE 107 03/02/2022    CO2 15 (L) 03/02/2022    GLUCOSE 115 (H) 03/02/2022    BUN 47 (H) 03/02/2022    CREATININE 1.48 (H) 03/02/2022        Lab Results   Component Value Date    WBC 8.5 03/02/2022    HEMOGLOBIN 9.6 (L) 03/02/2022    HEMATOCRIT 30.7 (L) 03/02/2022    PLATELETCT 221 03/02/2022        Total time of the discharge process exceeds 38 minutes.

## 2022-03-02 NOTE — DISCHARGE PLANNING
Received Choice form at [id number]  Agency/Facility Name: [facility]  Referral sent per Choice form @ [phone]

## 2022-03-02 NOTE — DISCHARGE INSTRUCTIONS
Discharge Instructions    Discharged to home by ambulance with relative. Discharged via ambulance, hospital escort: Yes.  Special equipment needed: Not Applicable    Be sure to schedule a follow-up appointment with your primary care doctor or any specialists as instructed.     Discharge Plan:   Influenza Vaccine Indication: Not indicated: Previously immunized this influenza season and > 8 years of age    I understand that a diet low in cholesterol, fat, and sodium is recommended for good health. Unless I have been given specific instructions below for another diet, I accept this instruction as my diet prescription.   Other diet: Regular    Special Instructions: None    · Is patient discharged on Warfarin / Coumadin?   No   Hypoglycemia  Hypoglycemia is when the sugar (glucose) level in your blood is too low. Signs of low blood sugar may include:  · Feeling:  ? Hungry.  ? Worried or nervous (anxious).  ? Sweaty and clammy.  ? Confused.  ? Dizzy.  ? Sleepy.  ? Sick to your stomach (nauseous).  · Having:  ? A fast heartbeat.  ? A headache.  ? A change in your vision.  ? Tingling or no feeling (numbness) around your mouth, lips, or tongue.  ? Jerky movements that you cannot control (seizure).  · Having trouble with:  ? Moving (coordination).  ? Sleeping.  ? Passing out (fainting).  ? Getting upset easily (irritability).  Low blood sugar can happen to people who have diabetes and people who do not have diabetes. Low blood sugar can happen quickly, and it can be an emergency.  Treating low blood sugar  Low blood sugar is often treated by eating or drinking something sugary right away, such as:  · Fruit juice, 4-6 oz (120-150 mL).  · Regular soda (not diet soda), 4-6 oz (120-150 mL).  · Low-fat milk, 4 oz (120 mL).  · Several pieces of hard candy.  · Sugar or honey, 1 Tbsp (15 mL).  Treating low blood sugar if you have diabetes  If you can think clearly and swallow safely, follow the 15:15 rule:  · Take 15 grams of a  fast-acting carb (carbohydrate). Talk with your doctor about how much you should take.  · Always keep a source of fast-acting carb with you, such as:  ? Sugar tablets (glucose pills). Take 3-4 pills.  ? 6-8 pieces of hard candy.  ? 4-6 oz (120-150 mL) of fruit juice.  ? 4-6 oz (120-150 mL) of regular (not diet) soda.  ? 1 Tbsp (15 mL) honey or sugar.  · Check your blood sugar 15 minutes after you take the carb.  · If your blood sugar is still at or below 70 mg/dL (3.9 mmol/L), take 15 grams of a carb again.  · If your blood sugar does not go above 70 mg/dL (3.9 mmol/L) after 3 tries, get help right away.  · After your blood sugar goes back to normal, eat a meal or a snack within 1 hour.    Treating very low blood sugar  If your blood sugar is at or below 54 mg/dL (3 mmol/L), you have very low blood sugar (severe hypoglycemia). This may also cause:  · Passing out.  · Jerky movements you cannot control (seizure).  · Losing consciousness (coma).  This is an emergency. Do not wait to see if the symptoms will go away. Get medical help right away. Call your local emergency services (911 in the [place] Do not drive yourself to the hospital.  If you have very low blood sugar and you cannot eat or drink, you may need a glucagon shot (injection). A family member or friend should learn how to check your blood sugar and how to give you a glucagon shot. Ask your doctor if you need to have a glucagon shot kit at home.  Follow these instructions at home:  General instructions  · Take over-the-counter and prescription medicines only as told by your doctor.  · Stay aware of your blood sugar as told by your doctor.  · Limit alcohol intake to no more than 1 drink a day for nonpregnant women and 2 drinks a day for men. One drink equals 12 oz of beer (355 mL), 5 oz of wine (148 mL), or 1½ oz of hard liquor (44 mL).  · Keep all follow-up visits as told by your doctor. This is important.  If you have diabetes:    · Follow your diabetes  care plan as told by your doctor. Make sure you:  ? Know the signs of low blood sugar.  ? Take your medicines as told.  ? Follow your exercise and meal plan.  ? Eat on time. Do not skip meals.  ? Check your blood sugar as often as told by your doctor. Always check it before and after exercise.  ? Follow your sick day plan when you cannot eat or drink normally. Make this plan ahead of time with your doctor.  · Share your diabetes care plan with:  ? Your work or school.  ? People you live with.  · Check your pee (urine) for ketones:  ? When you are sick.  ? As told by your doctor.  · Carry a card or wear jewelry that says you have diabetes.  Contact a doctor if:  · You have trouble keeping your blood sugar in your target range.  · You have low blood sugar often.  Get help right away if:  · You still have symptoms after you eat or drink something sugary.  · Your blood sugar is at or below 54 mg/dL (3 mmol/L).  · You have jerky movements that you cannot control.  · You pass out.  These symptoms may be an emergency. Do not wait to see if the symptoms will go away. Get medical help right away. Call your local emergency services (911 in the [place] Do not drive yourself to the hospital.  Summary  · Hypoglycemia happens when the level of sugar (glucose) in your blood is too low.  · Low blood sugar can happen to people who have diabetes and people who do not have diabetes. Low blood sugar can happen quickly, and it can be an emergency.  · Make sure you know the signs of low blood sugar and know how to treat it.  · Always keep a source of sugar (fast-acting carb) with you to treat low blood sugar.  This information is not intended to replace advice given to you by your health care provider. Make sure you discuss any questions you have with your health care provider.  Document Released: 03/14/2011 Document Revised: 04/09/2020 Document Reviewed: 01/20/2017  Elsevier Patient Education © 2020 Elsevier Inc[employer]      Depression /  Suicide Risk    As you are discharged from this [facility] facility, it is important to learn how to keep safe from harming yourself.    Recognize the warning signs:  · Abrupt changes in personality, positive or negative- including increase in energy   · Giving away possessions  · Change in eating patterns- significant weight changes-  positive or negative  · Change in sleeping patterns- unable to sleep or sleeping all the time   · Unwillingness or inability to communicate  · Depression  · Unusual sadness, discouragement and loneliness  · Talk of wanting to die  · Neglect of personal appearance   · Rebelliousness- reckless behavior  · Withdrawal from people/activities they love  · Confusion- inability to concentrate     If you or a loved one observes any of these behaviors or has concerns about self-harm, here's what you can do:  · Talk about it- your feelings and reasons for harming yourself  · Remove any means that you might use to hurt yourself (examples: pills, rope, extension cords, firearm)  · Get professional help from the community (Mental Health, Substance Abuse, psychological counseling)  · Do not be alone:Call your Safe Contact- someone whom you trust who will be there for you.  · Call your local CRISIS HOTLINE 104-1077 or 004-784-7501  · Call your local Children's Mobile Crisis Response Team [place] (533) 104-9247 or www.[employer]  · Call the toll free National Suicide Prevention Hotlines   · National Suicide Prevention Lifeline 834-968-XUQF (3706)  · National Hope Line Network 800-SUICIDE (100-7988)

## 2022-03-02 NOTE — DISCHARGE PLANNING
Anticipated Discharge Disposition:   Possibly back to  w// HH     Action:   Chart review complete     Discussed patient during AM rounds. Per MD, patient is medically cleared to discharge.      RN GERA called [facility] scheduling and spoke to Maxine. JODIE BOSS requested Maxine schedule PCP appointment for patient. Maxine to call patient and schedule appointment.     RN GERA will continue to follow.      Barriers to Discharge:   PCP appointment    acceptance      Plan:   HCM will continue to follow and assist with discharge needs.

## 2022-03-02 NOTE — DISCHARGE PLANNING
[facility]     Action:     0815: LSW completed chart review. Per chart review, pt came from [facility] and had [facility]. Discussed pt in morning rounds. Per MD, pt is clear to d/c today. Per MD, pt is not happy with GH he is at.    0950: LSW spoke with pt at bedside to complete assessment. Pt confirmed address on face sheet. Pt states he is unhappy at the  because the  does not provide assistance between 8pm and 7am. LSW provided pt with list of alternative GHs for him and his son to look over. Pt gave consent to send HH referral to [facility]. Pt states he was BIB [facility] and will need a ride home.    1020: LSW faxed HH choice form to [facility] for [facility]  with pt consent.    Barriers to Discharge: Transport set up     Plan: LSW to fax transport forms to [facility]    1145: LSW faxed transport forms to [facility] requested [facility] 1500.    1200: LSW called  at [phone], spoke to Sierra. Per Sierra, 1500 transfer is okay. Sierra requested d/c summary be faxed to [phone]. Sierra had questions about pt's medications. LSW transferred call to RN station for Sierra to speak with RN. LSW faxed d/c summary to  at number provided.    1205: Transport confirmed for 1500. LSW notified bedside RN and MD.    Care Transition Team Assessment    Information Source  Orientation Level: Disoriented to situation,Oriented to person,Oriented to situation,Oriented to time  Information Given By: Patient  Informant's Name: Naveed Joselito)  Who is responsible for making decisions for patient? : Patient         Elopement Risk  Legal Hold: No  Ambulatory or Self Mobile in Wheelchair: No-Not an Elopement Risk  Elopement Risk: Not at Risk for Elopement    Interdisciplinary Discharge Planning  Primary Care Physician: Myron Haines MD  Patient or legal guardian wants to designate a caregiver: No  Support Systems: Family Member(s)  Housing / Facility: detention  [facility] Care Facility: [facility]  Living    Discharge Preparedness  What is your plan after discharge?: senior living  What are your discharge supports?: Child,Other (comment) ( staff)         Finances  Financial Barriers to Discharge: No              Advance Directive  Advance Directive?: Living Will    Domestic Abuse  Have you ever been the victim of abuse or violence?: No  Has the violence increased in frequency and severity?: No  Are you afraid to go home today?: No  Did you have pets at the time of Abuse?: No  Do you know Where to get Help?: No  Physical Abuse or Sexual Abuse: No  Verbal Abuse or Emotional Abuse: No  Possible Abuse/Neglect Reported to:: Not Applicable         Discharge Risks or Barriers  Discharge risks or barriers?: No  Patient risk factors: Vulnerable adult    Anticipated Discharge Information  Discharge Disposition: Discharged to home/self care (01)

## 2022-03-02 NOTE — PROGRESS NOTES
Pt arrived to unit via stretcher, AO with confusion, Pt able to answer admission questions, oriented to room and use of call bell. BKA left leg, bandage intact,  Sacrum wounds documented in chart, with pictures.

## 2022-03-02 NOTE — FACE TO FACE
Face to Face Supporting Documentation - Home Health    The encounter with this patient was in whole or in part the primary reason for home health admission.    Date of encounter:   Patient:                    MRN:                       YOB: 2022  Naveed Tamayo  [id number]  6/11/1934     Home health to see patient for:  Skilled Nursing care for assessment, interventions & education, Home health aide, Physical Therapy evaluation and treatment and Occupational therapy evaluation and treatment    Skilled need for:  Exacerbation of Chronic Disease State DM2, hypoglycemia    Skilled nursing interventions to include:  Comment: pt/ot    Homebound status evidenced by:  Need the aid of supportive devices such as crutches, canes, wheelchairs or walkers or Needs the assistance of another person in order to leave the home. Leaving home requires a considerable and taxing effort. There is a normal inability to leave the home.    Community Physician to provide follow up care: Myron Haines M.D.     Optional Interventions? No      I certify the face to face encounter for this home health care referral meets the CMS requirements and the encounter/clinical assessment with the patient was, in whole, or in part, for the medical condition(s) listed above, which is the primary reason for home health care. Based on my clinical findings: the service(s) are medically necessary, support the need for home health care, and the homebound criteria are met.  I certify that this patient has had a face to face encounter by myself.  Scout Jin M.D. - NPI: [id number]

## 2022-03-02 NOTE — PROGRESS NOTES
4 Eyes Skin Assessment Completed by *  Des Lange**, RN and Elissa Nur, RN.    Head WDL  Ears WDL  Nose WDL  Mouth WDL  Neck WDL  Breast/Chest WDL  Shoulder Blades WDL  Spine WDL  (R) Arm/Elbow/Hand Redness and Bruising  (L) Arm/Elbow/Hand Redness and Bruising  Abdomen Scar  Groin Redness  Scrotum/Coccyx/Buttocks Redness and Excoriation  (R) Leg WDL  (L) Leg Scar  (R) Heel/Foot/Toe WDL  (L) Heel/Foot/Toe WDL          Devices In Places Blood Pressure Cuff      Interventions In Place Sacral Mepilex    Possible Skin Injury Yes    Pictures Uploaded Into Epic Yes  Wound Consult Placed Yes  RN Wound Prevention Protocol Ordered Yes

## 2022-03-02 NOTE — CARE PLAN
Problem: Knowledge Deficit - Standard  Goal: Patient and family/care givers will demonstrate understanding of plan of care, disease process/condition, diagnostic tests and medications  Outcome: Met     Problem: Diabetes Management  Goal: Patient will achieve and maintain glucose in satisfactory range  Outcome: Met     Problem: Knowledge Deficit - Diabetes  Goal: Patient will demonstrate knowledge of insulin injection, symptoms, and treatment of hypoglycemia and diet prior to discharge  Outcome: Met     Problem: Discharge Planning - Diabetes  Goal: Patient's continuum of care needs will be met  Outcome: Met     Problem: Skin Integrity - Diabetes  Goal: Patient's skin on legs and feet will remain intact while hospitalized  Outcome: Met     Problem: Infection - Diabetes  Goal: Patient will remain free from signs and symptoms of infection  Outcome: Met  Goal: Promotion wound healing, line and drain management  Outcome: Met     Problem: Respiratory  Goal: Patient will achieve/maintain optimum respiratory ventilation and gas exchange  Outcome: Met     Problem: Fluid Balance or Risk for Fluid Volume Deficit  Goal: Patient will demonstrate adequate hydration and vital signs  Outcome: Met     Problem: Nutrition Deficit - Diabetes  Goal: Patient will demonstrate adequate hydration and vital signs  Outcome: Met     Problem: Diabetic Ulcer  Goal: Early identification of diabetic foot wound and initiation of appropriate interventions  Outcome: Met     Problem: Fall Risk  Goal: Patient will remain free from falls  Outcome: Met     Problem: Skin Integrity  Goal: Skin integrity is maintained or improved  Outcome: Met   The patient is Stable - Low risk of patient condition declining or worsening    Shift Goals  Clinical Goals: discharge today and BS control  Patient Goals: Discharge today and BS control    Progress made toward(s) clinical / shift goals:  Pt waiting for transport to discharge to [facility]      Patient is not  progressing towards the following goals:

## 2022-03-02 NOTE — ASSESSMENT & PLAN NOTE
At this juncture I feel it would be prudent to discontinue his basal and prandial insulins, will monitor with a sliding scale regimen overnight.  If this is unremarkable anticipate he can return to his group home in the morning with these changes in place.  I will have fairly liberal glucose targets in this 87-year-old gentleman.

## 2022-03-02 NOTE — DISCHARGE PLANNING
DC Transport Scheduled    Received request at: 0796    Transport Company Scheduled:  [employer]  Spoke with Liliam at [facility] to schedule transport.    Scheduled Date: 03/02/22  Scheduled Time: 1500    Destination: Group Home  [address] [place]    Notified care team of scheduled transport via [employer].     If there are any changes needed to the DC transportation scheduled, please contact [facility] Ride Line at ext. [phone] between the hours of 7792-4623 Mon-Fri. If outside those hours, contact the ED Case Manager at ext. [phone].

## 2022-03-02 NOTE — CARE PLAN
The patient is Stable - Low risk of patient condition declining or worsening    Shift Goals  Clinical Goals: Monitor BS, safety,  Patient Goals: Monitor BS, safety    Progress made toward(s) clinical / shift goals:  free from falls, maintain appropriate blood sugar    Patient is not progressing towards the following goals:

## 2023-03-08 ENCOUNTER — TELEPHONE (OUTPATIENT)
Dept: CARDIOLOGY | Facility: MEDICAL CENTER | Age: 88
End: 2023-03-08
Payer: MEDICARE

## 2023-03-08 NOTE — LETTER
March 8, 2023      Patient: Naveed Tamayo  YOB: 1934        Dear Dr. Haines,       Your patient has been flagged through our echocardiogram surveillance with the following echocardiogram results:    Moderate Aortic Stenosis    [facility] is dedicated to providing comprehensive care to all of its patients.     We recognize that ensuring excellent communication with our patients' providers during and after care at our facility is a key component in achieving the highest level of care for each patient.    Our Structural Heart Program has implemented a quality initiative aimed at identifying patients with valvular heart disease and confirming a clinical plan for their care upon diagnosis.     At [facility], we are committed to establishing collaborative relationships with the local physician community to ensure that patients receive the highest quality care.     Please place Referral to [facility], sub-specialty Structural Heart Program, if warranted for consult and treatment.     Should you have any questions regarding this information or referral process:    Please contact:  [facility]  directly at [phone]      Respectfully,    [facility]

## 2023-03-08 NOTE — TELEPHONE ENCOUNTER
Echo surveillance letter generated and faxed to PCP Dr. Myron Haines ([facility]) at [phone]. Receipt confirmed.

## 2023-08-09 NOTE — ED NOTES
, pt resting on pina, updated on plan of care, denies needs at this time.   Cosentyx Counseling:  I discussed with the patient the risks of Cosentyx including but not limited to worsening of Crohn's disease, immunosuppression, allergic reactions and infections.  The patient understands that monitoring is required including a PPD at baseline and must alert us or the primary physician if symptoms of infection or other concerning signs are noted.